# Patient Record
Sex: FEMALE | Race: BLACK OR AFRICAN AMERICAN | NOT HISPANIC OR LATINO | URBAN - NONMETROPOLITAN AREA
[De-identification: names, ages, dates, MRNs, and addresses within clinical notes are randomized per-mention and may not be internally consistent; named-entity substitution may affect disease eponyms.]

---

## 2018-04-18 ENCOUNTER — HISTORICAL (OUTPATIENT)
Dept: ADMINISTRATIVE | Facility: HOSPITAL | Age: 48
End: 2018-04-18

## 2018-07-12 ENCOUNTER — HISTORICAL (OUTPATIENT)
Dept: ADMINISTRATIVE | Facility: HOSPITAL | Age: 48
End: 2018-07-12

## 2018-07-24 ENCOUNTER — HISTORICAL (OUTPATIENT)
Dept: ADMINISTRATIVE | Facility: HOSPITAL | Age: 48
End: 2018-07-24

## 2018-07-25 ENCOUNTER — HISTORICAL (OUTPATIENT)
Dept: ADMINISTRATIVE | Facility: HOSPITAL | Age: 48
End: 2018-07-25

## 2018-08-28 ENCOUNTER — HISTORICAL (OUTPATIENT)
Dept: ADMINISTRATIVE | Facility: HOSPITAL | Age: 48
End: 2018-08-28

## 2018-12-06 ENCOUNTER — HISTORICAL (OUTPATIENT)
Dept: ADMINISTRATIVE | Facility: HOSPITAL | Age: 48
End: 2018-12-06

## 2018-12-10 ENCOUNTER — HISTORICAL (OUTPATIENT)
Dept: ADMINISTRATIVE | Facility: HOSPITAL | Age: 48
End: 2018-12-10

## 2018-12-27 ENCOUNTER — HISTORICAL (OUTPATIENT)
Dept: ADMINISTRATIVE | Facility: HOSPITAL | Age: 48
End: 2018-12-27

## 2018-12-28 ENCOUNTER — HISTORICAL (OUTPATIENT)
Dept: ADMINISTRATIVE | Facility: HOSPITAL | Age: 48
End: 2018-12-28

## 2019-03-19 ENCOUNTER — HISTORICAL (OUTPATIENT)
Dept: ADMINISTRATIVE | Facility: HOSPITAL | Age: 49
End: 2019-03-19

## 2019-06-09 ENCOUNTER — HISTORICAL (OUTPATIENT)
Dept: ADMINISTRATIVE | Facility: HOSPITAL | Age: 49
End: 2019-06-09

## 2019-06-11 ENCOUNTER — HISTORICAL (OUTPATIENT)
Dept: ADMINISTRATIVE | Facility: HOSPITAL | Age: 49
End: 2019-06-11

## 2019-07-08 ENCOUNTER — HISTORICAL (OUTPATIENT)
Dept: ADMINISTRATIVE | Facility: HOSPITAL | Age: 49
End: 2019-07-08

## 2019-10-17 ENCOUNTER — HISTORICAL (OUTPATIENT)
Dept: ADMINISTRATIVE | Facility: HOSPITAL | Age: 49
End: 2019-10-17

## 2019-11-27 ENCOUNTER — HISTORICAL (OUTPATIENT)
Dept: ADMINISTRATIVE | Facility: HOSPITAL | Age: 49
End: 2019-11-27

## 2020-01-14 ENCOUNTER — HISTORICAL (OUTPATIENT)
Dept: ADMINISTRATIVE | Facility: HOSPITAL | Age: 50
End: 2020-01-14

## 2020-01-19 ENCOUNTER — HISTORICAL (OUTPATIENT)
Dept: ADMINISTRATIVE | Facility: HOSPITAL | Age: 50
End: 2020-01-19

## 2020-01-21 ENCOUNTER — HISTORICAL (OUTPATIENT)
Dept: ADMINISTRATIVE | Facility: HOSPITAL | Age: 50
End: 2020-01-21

## 2020-01-23 ENCOUNTER — HISTORICAL (OUTPATIENT)
Dept: ADMINISTRATIVE | Facility: HOSPITAL | Age: 50
End: 2020-01-23

## 2020-02-11 ENCOUNTER — HISTORICAL (OUTPATIENT)
Dept: ADMINISTRATIVE | Facility: HOSPITAL | Age: 50
End: 2020-02-11

## 2020-02-24 ENCOUNTER — HISTORICAL (OUTPATIENT)
Dept: ADMINISTRATIVE | Facility: HOSPITAL | Age: 50
End: 2020-02-24

## 2020-03-19 ENCOUNTER — HISTORICAL (OUTPATIENT)
Dept: CARDIOLOGY | Facility: HOSPITAL | Age: 50
End: 2020-03-19

## 2020-03-23 ENCOUNTER — HISTORICAL (OUTPATIENT)
Dept: ADMINISTRATIVE | Facility: HOSPITAL | Age: 50
End: 2020-03-23

## 2020-07-14 ENCOUNTER — HISTORICAL (OUTPATIENT)
Dept: ADMINISTRATIVE | Facility: HOSPITAL | Age: 50
End: 2020-07-14

## 2020-12-22 ENCOUNTER — HISTORICAL (OUTPATIENT)
Dept: ADMINISTRATIVE | Facility: HOSPITAL | Age: 50
End: 2020-12-22

## 2021-02-02 LAB
ALBUMIN SERPL-MCNC: 4.5 G/DL (ref 3.4–5)
ALBUMIN/GLOB SERPL: 1.5 {RATIO}
ALP SERPL-CCNC: 92 U/L (ref 50–144)
ALT SERPL-CCNC: 21 U/L (ref 1–45)
ANION GAP SERPL CALC-SCNC: 8 MMOL/L (ref 7–16)
AST SERPL-CCNC: 32 U/L (ref 14–36)
BASOPHILS # BLD AUTO: 0.02 X10(3)/MCL (ref 0.01–0.08)
BASOPHILS NFR BLD AUTO: 0.3 % (ref 0.1–1.2)
BILIRUB SERPL-MCNC: 0.47 MG/DL (ref 0.1–1)
BUN SERPL-MCNC: 13 MG/DL (ref 7–20)
CALCIUM SERPL-MCNC: 10.4 MG/DL (ref 8.4–10.2)
CHLORIDE SERPL-SCNC: 106 MMOL/L (ref 94–110)
CHOLEST SERPL-MCNC: 135 MG/DL (ref 0–200)
CO2 SERPL-SCNC: 27 MMOL/L (ref 21–32)
CREAT SERPL-MCNC: 1 MG/DL (ref 0.52–1.04)
CREAT/UREA NIT SERPL: 13 (ref 12–20)
EOSINOPHIL # BLD AUTO: 0.08 X10(3)/MCL (ref 0.04–0.36)
EOSINOPHIL NFR BLD AUTO: 1.1 % (ref 0.7–7)
ERYTHROCYTE [DISTWIDTH] IN BLOOD BY AUTOMATED COUNT: 13.4 % (ref 11–14.5)
EST. AVERAGE GLUCOSE BLD GHB EST-MCNC: 129 MG/DL (ref 70–115)
GLOBULIN SER-MCNC: 3 G/DL (ref 2–3.9)
GLUCOSE SERPL-MCNC: 143 MGM./DL (ref 70–115)
HBA1C MFR BLD: 6.3 % (ref 4–6)
HCT VFR BLD AUTO: 45.5 % (ref 36–48)
HDLC SERPL-MCNC: 83 MG/DL (ref 40–60)
HGB BLD-MCNC: 13.7 G/DL (ref 11.8–16)
IMM GRANULOCYTES # BLD AUTO: 0.01 X10E3/UL (ref 0–0.03)
IMM GRANULOCYTES NFR BLD AUTO: 0.1 % (ref 0–0.5)
LDLC SERPL CALC-MCNC: <30 MG/DL (ref 30–100)
LYMPHOCYTES # BLD AUTO: 2.61 X10(3)/MCL (ref 1.16–3.74)
LYMPHOCYTES NFR BLD AUTO: 36.6 % (ref 20–55)
MCH RBC QN AUTO: 27.9 PG (ref 27–34)
MCHC RBC AUTO-ENTMCNC: 30.1 G/DL (ref 31–37)
MCV RBC AUTO: 92.7 FL (ref 79–99)
MONOCYTES # BLD AUTO: 0.51 X10(3)/MCL (ref 0.24–0.36)
MONOCYTES NFR BLD AUTO: 7.1 % (ref 4.7–12.5)
NEUTROPHILS # BLD AUTO: 3.91 X10(3)/MCL (ref 1.56–6.13)
NEUTROPHILS NFR BLD AUTO: 54.8 % (ref 37–73)
PLATELET # BLD AUTO: 229 X10(3)/MCL (ref 140–371)
PMV BLD AUTO: 11.3 FL (ref 9.4–12.4)
POTASSIUM SERPL-SCNC: 4.3 MMOL/L (ref 3.5–5.1)
PROT SERPL-MCNC: 7.5 G/DL (ref 6.3–8.2)
RBC # BLD AUTO: 4.91 X10(6)/MCL (ref 4–5.1)
SODIUM SERPL-SCNC: 141 MMOL/L (ref 135–145)
TRIGL SERPL-MCNC: 172 MG/DL (ref 30–200)
TSH SERPL-ACNC: 1.7 UIU/ML (ref 0.36–3.74)
WBC # SPEC AUTO: 7.1 X10(3)/MCL (ref 4–11.5)

## 2021-07-27 ENCOUNTER — HISTORICAL (OUTPATIENT)
Dept: ADMINISTRATIVE | Facility: HOSPITAL | Age: 51
End: 2021-07-27

## 2021-08-10 LAB
ALBUMIN SERPL-MCNC: 4 G/DL (ref 3.4–5)
ALBUMIN/GLOB SERPL: 1.3 {RATIO}
ALP SERPL-CCNC: 118 U/L (ref 50–144)
ALT SERPL-CCNC: 18 U/L (ref 1–45)
ANION GAP SERPL CALC-SCNC: 8 MMOL/L (ref 7–16)
AST SERPL-CCNC: 22 U/L (ref 14–36)
BILIRUB SERPL-MCNC: 0.48 MG/DL (ref 0.1–1)
BUN SERPL-MCNC: 14 MG/DL (ref 7–20)
CALCIUM SERPL-MCNC: 10.1 MG/DL (ref 8.4–10.2)
CHLORIDE SERPL-SCNC: 105 MMOL/L (ref 94–110)
CO2 SERPL-SCNC: 24 MMOL/L (ref 21–32)
CREAT SERPL-MCNC: 0.8 MG/DL (ref 0.52–1.04)
CREAT/UREA NIT SERPL: 17.5 (ref 12–20)
EST. AVERAGE GLUCOSE BLD GHB EST-MCNC: 186 MG/DL (ref 70–115)
GLOBULIN SER-MCNC: 3.1 G/DL (ref 2–3.9)
GLUCOSE SERPL-MCNC: 217 MGM./DL (ref 70–115)
HBA1C MFR BLD: 8.2 % (ref 4–6)
POTASSIUM SERPL-SCNC: 4.3 MMOL/L (ref 3.5–5.1)
PROT SERPL-MCNC: 7.1 G/DL (ref 6.3–8.2)
SODIUM SERPL-SCNC: 137 MMOL/L (ref 135–145)
TSH SERPL-ACNC: 1.78 UIU/ML (ref 0.36–3.74)

## 2022-03-22 LAB
LEFT EYE DM RETINOPATHY: NEGATIVE
RIGHT EYE DM RETINOPATHY: NEGATIVE

## 2022-04-10 ENCOUNTER — HISTORICAL (OUTPATIENT)
Dept: ADMINISTRATIVE | Facility: HOSPITAL | Age: 52
End: 2022-04-10

## 2022-04-28 VITALS
OXYGEN SATURATION: 96 % | DIASTOLIC BLOOD PRESSURE: 82 MMHG | HEIGHT: 62 IN | WEIGHT: 177.5 LBS | BODY MASS INDEX: 32.66 KG/M2 | SYSTOLIC BLOOD PRESSURE: 126 MMHG

## 2022-04-30 ENCOUNTER — HISTORICAL (OUTPATIENT)
Dept: ADMINISTRATIVE | Facility: HOSPITAL | Age: 52
End: 2022-04-30

## 2023-01-26 ENCOUNTER — TELEPHONE (OUTPATIENT)
Dept: FAMILY MEDICINE | Facility: CLINIC | Age: 53
End: 2023-01-26
Payer: MEDICAID

## 2023-01-26 NOTE — TELEPHONE ENCOUNTER
----- Message from Zulma Zhang, JESUSP-C sent at 1/26/2023  6:51 AM CST -----  Regarding: Call patient  I saw Dasha yesterday as she was here with her daughter, She inquired about a skin lesion that we talked about previously.  I would like to know if she ever got that derm appointment, as he would be the perfect person to discuss this with.  Also let her know that she has labs and f/u in February with me.

## 2023-01-27 NOTE — TELEPHONE ENCOUNTER
I called and notified her that the referral was sent to Dr. Prasanth Edwards and gave her the phone number.

## 2023-01-30 ENCOUNTER — DOCUMENTATION ONLY (OUTPATIENT)
Dept: ADMINISTRATIVE | Facility: HOSPITAL | Age: 53
End: 2023-01-30
Payer: MEDICAID

## 2023-02-03 DIAGNOSIS — M54.16 LUMBAR RADICULOPATHY: Primary | ICD-10-CM

## 2023-02-03 RX ORDER — NITROGLYCERIN 0.4 MG/1
TABLET SUBLINGUAL
Qty: 25 TABLET | Refills: 2 | Status: SHIPPED | OUTPATIENT
Start: 2023-02-03 | End: 2023-09-11

## 2023-02-03 RX ORDER — IBUPROFEN 800 MG/1
TABLET ORAL
Qty: 30 TABLET | Refills: 1 | Status: SHIPPED | OUTPATIENT
Start: 2023-02-03 | End: 2023-05-18

## 2023-02-17 PROCEDURE — 80061 LIPID PANEL: CPT | Performed by: NURSE PRACTITIONER

## 2023-02-17 PROCEDURE — 83036 HEMOGLOBIN GLYCOSYLATED A1C: CPT | Performed by: NURSE PRACTITIONER

## 2023-02-17 PROCEDURE — 85025 COMPLETE CBC W/AUTO DIFF WBC: CPT | Performed by: NURSE PRACTITIONER

## 2023-02-17 PROCEDURE — 80053 COMPREHEN METABOLIC PANEL: CPT | Performed by: NURSE PRACTITIONER

## 2023-02-17 PROCEDURE — 84443 ASSAY THYROID STIM HORMONE: CPT | Performed by: NURSE PRACTITIONER

## 2023-02-23 ENCOUNTER — OFFICE VISIT (OUTPATIENT)
Dept: BEHAVIORAL HEALTH | Facility: CLINIC | Age: 53
End: 2023-02-23
Payer: MEDICAID

## 2023-02-23 VITALS
HEIGHT: 62 IN | BODY MASS INDEX: 32.57 KG/M2 | DIASTOLIC BLOOD PRESSURE: 88 MMHG | WEIGHT: 177 LBS | TEMPERATURE: 97 F | HEART RATE: 94 BPM | SYSTOLIC BLOOD PRESSURE: 124 MMHG | OXYGEN SATURATION: 97 %

## 2023-02-23 DIAGNOSIS — G47.00 INSOMNIA, UNSPECIFIED TYPE: ICD-10-CM

## 2023-02-23 DIAGNOSIS — F41.1 GENERALIZED ANXIETY DISORDER: ICD-10-CM

## 2023-02-23 DIAGNOSIS — F33.9 EPISODE OF RECURRENT MAJOR DEPRESSIVE DISORDER, UNSPECIFIED DEPRESSION EPISODE SEVERITY: Primary | ICD-10-CM

## 2023-02-23 PROCEDURE — 3008F PR BODY MASS INDEX (BMI) DOCUMENTED: ICD-10-PCS | Mod: CPTII,,, | Performed by: NURSE PRACTITIONER

## 2023-02-23 PROCEDURE — 1160F RVW MEDS BY RX/DR IN RCRD: CPT | Mod: CPTII,,, | Performed by: NURSE PRACTITIONER

## 2023-02-23 PROCEDURE — 3079F PR MOST RECENT DIASTOLIC BLOOD PRESSURE 80-89 MM HG: ICD-10-PCS | Mod: CPTII,,, | Performed by: NURSE PRACTITIONER

## 2023-02-23 PROCEDURE — 3074F SYST BP LT 130 MM HG: CPT | Mod: CPTII,,, | Performed by: NURSE PRACTITIONER

## 2023-02-23 PROCEDURE — 1160F PR REVIEW ALL MEDS BY PRESCRIBER/CLIN PHARMACIST DOCUMENTED: ICD-10-PCS | Mod: CPTII,,, | Performed by: NURSE PRACTITIONER

## 2023-02-23 PROCEDURE — 3079F DIAST BP 80-89 MM HG: CPT | Mod: CPTII,,, | Performed by: NURSE PRACTITIONER

## 2023-02-23 PROCEDURE — 1159F PR MEDICATION LIST DOCUMENTED IN MEDICAL RECORD: ICD-10-PCS | Mod: CPTII,,, | Performed by: NURSE PRACTITIONER

## 2023-02-23 PROCEDURE — 3074F PR MOST RECENT SYSTOLIC BLOOD PRESSURE < 130 MM HG: ICD-10-PCS | Mod: CPTII,,, | Performed by: NURSE PRACTITIONER

## 2023-02-23 PROCEDURE — 1159F MED LIST DOCD IN RCRD: CPT | Mod: CPTII,,, | Performed by: NURSE PRACTITIONER

## 2023-02-23 PROCEDURE — 99204 PR OFFICE/OUTPT VISIT, NEW, LEVL IV, 45-59 MIN: ICD-10-PCS | Mod: ,,, | Performed by: NURSE PRACTITIONER

## 2023-02-23 PROCEDURE — 3008F BODY MASS INDEX DOCD: CPT | Mod: CPTII,,, | Performed by: NURSE PRACTITIONER

## 2023-02-23 PROCEDURE — 99204 OFFICE O/P NEW MOD 45 MIN: CPT | Mod: ,,, | Performed by: NURSE PRACTITIONER

## 2023-02-23 RX ORDER — CETIRIZINE HYDROCHLORIDE 10 MG/1
10 TABLET ORAL DAILY PRN
COMMUNITY
Start: 2022-11-15

## 2023-02-23 RX ORDER — ZOLPIDEM TARTRATE 10 MG/1
10 TABLET ORAL NIGHTLY PRN
Qty: 30 TABLET | Refills: 1 | Status: SHIPPED | OUTPATIENT
Start: 2023-02-23 | End: 2023-04-18 | Stop reason: SDUPTHER

## 2023-02-23 RX ORDER — CARVEDILOL 12.5 MG/1
12.5 TABLET ORAL 2 TIMES DAILY
COMMUNITY
Start: 2023-02-13 | End: 2023-03-13 | Stop reason: SDUPTHER

## 2023-02-23 RX ORDER — CYCLOBENZAPRINE HCL 10 MG
10 TABLET ORAL NIGHTLY PRN
COMMUNITY
Start: 2022-12-22 | End: 2023-02-28 | Stop reason: SDUPTHER

## 2023-02-23 RX ORDER — ZOLPIDEM TARTRATE 10 MG/1
10 TABLET ORAL NIGHTLY PRN
COMMUNITY
Start: 2023-02-13 | End: 2023-02-23 | Stop reason: SDUPTHER

## 2023-02-23 RX ORDER — DULOXETIN HYDROCHLORIDE 60 MG/1
60 CAPSULE, DELAYED RELEASE ORAL DAILY
Qty: 30 CAPSULE | Refills: 1 | Status: SHIPPED | OUTPATIENT
Start: 2023-02-23 | End: 2023-04-18 | Stop reason: SDUPTHER

## 2023-02-23 RX ORDER — METFORMIN HYDROCHLORIDE 500 MG/1
2 TABLET, EXTENDED RELEASE ORAL NIGHTLY
COMMUNITY
Start: 2023-02-13 | End: 2023-03-13 | Stop reason: SDUPTHER

## 2023-02-23 RX ORDER — LEVETIRACETAM 500 MG/1
500 TABLET ORAL 2 TIMES DAILY
COMMUNITY
Start: 2023-02-13 | End: 2023-02-28 | Stop reason: SDUPTHER

## 2023-02-23 RX ORDER — ALPRAZOLAM 0.5 MG/1
0.5 TABLET ORAL 3 TIMES DAILY PRN
Qty: 90 TABLET | Refills: 1 | Status: SHIPPED | OUTPATIENT
Start: 2023-02-23 | End: 2023-04-18 | Stop reason: SDUPTHER

## 2023-02-23 RX ORDER — DULAGLUTIDE 0.75 MG/.5ML
0.75 INJECTION, SOLUTION SUBCUTANEOUS WEEKLY
COMMUNITY
Start: 2023-02-10 | End: 2023-08-30 | Stop reason: SDUPTHER

## 2023-02-23 RX ORDER — EMPAGLIFLOZIN 25 MG/1
25 TABLET, FILM COATED ORAL EVERY MORNING
COMMUNITY
Start: 2023-02-10 | End: 2023-08-30

## 2023-02-23 RX ORDER — ASPIRIN 81 MG
81 TABLET, DELAYED RELEASE (ENTERIC COATED) ORAL NIGHTLY
COMMUNITY
Start: 2022-09-07

## 2023-02-23 RX ORDER — ATORVASTATIN CALCIUM 20 MG/1
20 TABLET, FILM COATED ORAL DAILY
COMMUNITY
Start: 2023-01-11 | End: 2023-03-13 | Stop reason: SDUPTHER

## 2023-02-23 RX ORDER — GABAPENTIN 400 MG/1
3 CAPSULE ORAL DAILY
COMMUNITY
Start: 2023-02-13 | End: 2023-08-30

## 2023-02-23 RX ORDER — ALPRAZOLAM 0.5 MG/1
0.5 TABLET ORAL 3 TIMES DAILY PRN
COMMUNITY
Start: 2023-02-13 | End: 2023-02-23 | Stop reason: SDUPTHER

## 2023-02-23 RX ORDER — DULOXETIN HYDROCHLORIDE 60 MG/1
60 CAPSULE, DELAYED RELEASE ORAL DAILY
COMMUNITY
Start: 2023-02-13 | End: 2023-02-23 | Stop reason: SDUPTHER

## 2023-02-23 RX ORDER — FLUTICASONE PROPIONATE 50 MCG
1 SPRAY, SUSPENSION (ML) NASAL 2 TIMES DAILY
COMMUNITY
Start: 2022-11-15 | End: 2023-11-21 | Stop reason: SDUPTHER

## 2023-02-23 RX ORDER — SERTRALINE HYDROCHLORIDE 50 MG/1
50 TABLET, FILM COATED ORAL EVERY MORNING
COMMUNITY
Start: 2023-02-13 | End: 2023-02-23

## 2023-02-23 RX ORDER — AMLODIPINE BESYLATE 10 MG/1
10 TABLET ORAL DAILY
COMMUNITY
Start: 2023-02-13 | End: 2023-08-30

## 2023-02-23 NOTE — PROGRESS NOTES
"PSYCHIATRIC FOLLOW-UP VISIT NOTE    Chief Complaint   Patient presents with    Anxiety     "I have no complaints."         History of Present Illness  53 y.o. year old Black or  female with hx of MDD, scot, and insomnia seen today for follow-up appointment and medication management.  Reports she is doing well on current medications and denies any side effects at this time.  Denies recent depression.  Mood has been good.  No appetite changes, isolative behaviors, feelings of guilt or shame, anhedonia, and morbid ruminations.  Anxiety also well controlled with current medication regimen.  She denies any panic symptoms, excessive worry, fidgetiness, or irritability.  She is sleeping well and feels rested in the morning.   checked.  No suspected diversion or abuse.  Overall she is been out any acute complaints today and tolerating current medication regimen with good efficacy and no adverse effects.Patient denies SI/HI. Denies hallucinations and does not appear to be responding to internal stimuli or be internally preoccupied. No manic symptoms noted.       Objective:     Vitals:  Vitals:    02/23/23 1325   BP: 124/88   BP Location: Left arm   Patient Position: Sitting   Pulse: 94   Temp: 96.8 °F (36 °C)   TempSrc: Temporal   SpO2: 97%   Weight: 80.3 kg (177 lb)   Height: 5' 2" (1.575 m)       Wt Readings from Last 3 Encounters:   02/23/23 1325 80.3 kg (177 lb)   01/25/22 1452 80.5 kg (177 lb 7.5 oz)   12/08/21 1414 80.6 kg (177 lb 11.1 oz)   11/11/21 1513 83.5 kg (184 lb 1.4 oz)   11/08/21 1303 82.4 kg (181 lb 10.5 oz)   09/08/21 1024 81.5 kg (179 lb 10.8 oz)   08/16/21 1454 80.6 kg (177 lb 11.1 oz)   07/21/21 1356 82.7 kg (182 lb 5.1 oz)   02/03/21 0910 82.9 kg (182 lb 12.2 oz)   01/20/21 1315 81 kg (178 lb 9.2 oz)   11/18/20 1400 81.3 kg (179 lb 3.7 oz)   08/07/20 1047 88.5 kg (195 lb 1.7 oz)   03/03/20 1045 94.4 kg (208 lb 1.8 oz)   01/31/20 1517 95.5 kg (210 lb 8.6 oz) "         Medication:    Current Outpatient Medications:     ADULT LOW DOSE ASPIRIN 81 mg EC tablet, Take 81 mg by mouth nightly., Disp: , Rfl:     amLODIPine (NORVASC) 10 MG tablet, Take 10 mg by mouth Daily., Disp: , Rfl:     atorvastatin (LIPITOR) 20 MG tablet, Take 20 mg by mouth Daily., Disp: , Rfl:     carvediloL (COREG) 12.5 MG tablet, Take 12.5 mg by mouth 2 (two) times daily., Disp: , Rfl:     cetirizine (ZYRTEC) 10 MG tablet, Take 10 mg by mouth daily as needed., Disp: , Rfl:     cyclobenzaprine (FLEXERIL) 10 MG tablet, Take 10 mg by mouth nightly as needed., Disp: , Rfl:     fluticasone propionate (FLONASE) 50 mcg/actuation nasal spray, 1 spray by Each Nostril route 2 (two) times daily., Disp: , Rfl:     gabapentin (NEURONTIN) 400 MG capsule, Take 3 capsules by mouth Daily., Disp: , Rfl:     ibuprofen (ADVIL,MOTRIN) 800 MG tablet, TAKE 1 TABLET BY MOUTH EVERY EIGHT HOURS, Disp: 30 tablet, Rfl: 1    JARDIANCE 25 mg tablet, Take 25 mg by mouth every morning., Disp: , Rfl:     levETIRAcetam (KEPPRA) 500 MG Tab, Take 500 mg by mouth 2 (two) times daily., Disp: , Rfl:     metFORMIN (GLUCOPHAGE-XR) 500 MG ER 24hr tablet, Take 2 tablets by mouth nightly., Disp: , Rfl:     nitroGLYCERIN (NITROSTAT) 0.4 MG SL tablet, 1 TABLET UNDER TONGUE EVERY  5 MINUTES NO MORE THAN 3 TABS THEN GO TO ER FOR CHEST PAIN, Disp: 25 tablet, Rfl: 2    terbinafine HCL (LAMISIL) 250 mg tablet, TAKE 1 TABLET BY MOUTH EVERY DAY for 12 weeks, Disp: 84 tablet, Rfl: 0    TRULICITY 0.75 mg/0.5 mL pen injector, Inject 0.75 mg into the skin once a week., Disp: , Rfl:     ALPRAZolam (XANAX) 0.5 MG tablet, Take 1 tablet (0.5 mg total) by mouth 3 (three) times daily as needed for Anxiety., Disp: 90 tablet, Rfl: 1    DULoxetine (CYMBALTA) 60 MG capsule, Take 1 capsule (60 mg total) by mouth Daily., Disp: 30 capsule, Rfl: 1    zolpidem (AMBIEN) 10 mg Tab, Take 1 tablet (10 mg total) by mouth nightly as needed., Disp: 30 tablet, Rfl: 1  "      Significant Labs: - none at this time    Significant Imaging: - none at this time    Physical Exam  Vitals and nursing note reviewed.   Constitutional:       General: She is awake.      Appearance: Normal appearance.   Musculoskeletal:      Comments: Full ROM   Neurological:      Mental Status: She is alert.   Psychiatric:         Attention and Perception: Attention and perception normal. She does not perceive auditory or visual hallucinations.         Mood and Affect: Affect normal.         Speech: Speech normal.         Behavior: Behavior is cooperative.         Thought Content: Thought content does not include homicidal or suicidal ideation.         Cognition and Memory: Cognition and memory normal.        Review of Systems     Mental Status Exam:  Presentation:  - Appearance: 53 y.o. year old Black or  female, appears stated age, appears Casually dressed and Well groomed  - Motility: Erect when standing, Steady gait, No EPS or Tremors, No psychomotor agitation or retardation appreciated  - Behavior: calm, cooperative, good eye contact  Speech:  - Character/Organization: spontaneous, fluent, normal volume, normal rate, normal rhythm  Emotional State:  - Mood: "happy"   - Affect: congruent and appropriate  Thought:  - Process: logical, linear, organized , goal-directed  - Preoccupations: no ruminations, rituals, or phobias appreciated  - Delusions: no persecutory, paranoid, or grandiose delusions appreciated  - Perception: denies AVH, not actively responding to internal stimuli  - SI/HI: denies/denies  Sensorium & Intellect:  - Sensorium: AAOx4  - Memory: intact to recent and remote events  - Attention/Concentration: good/good  - Insight/Judgement: good/good    Gait: normal swing and stance  MSK:no rigidity appreciated    All other systems without acute issues unless noted in HPI      Assessment/Plan      ICD-10-CM ICD-9-CM    1. Episode of recurrent major depressive disorder, unspecified " depression episode severity  F33.9 296.30 DULoxetine (CYMBALTA) 60 MG capsule      2. Generalized anxiety disorder  F41.1 300.02 ALPRAZolam (XANAX) 0.5 MG tablet      DULoxetine (CYMBALTA) 60 MG capsule      3. Insomnia, unspecified type  G47.00 780.52 zolpidem (AMBIEN) 10 mg Tab         Continue current medications without change    Potential side effects and risks vs benefits of current treatment plan reviewed with patient. Applicable black box warnings reviewed. Encouraged patient not to alter dosages or abruptly discontinue medications without contacting prescriber first, due to risk of worsening symptoms and decompensation of mental status. Warned of risks associated with herbal remedies and supplements while taking psychotropic medications and of the need to consult prescriber prior to adding any of these to current regimen. Patient should abstain from abuse of alcohol, prescription medications, and illicit drugs. Reviewed when to contact clinic and/or seek emergent care, such as but not limited to, onset/worsening SI/HI, hallucinations, delusions, manic symptoms. Pt verbalized understanding and agreement of these warnings/recommendations and verbally consented to treatment plan.      Follow up in about 2 months (around 4/23/2023).    Brendan Case, JUSTICEP

## 2023-02-28 ENCOUNTER — OFFICE VISIT (OUTPATIENT)
Dept: FAMILY MEDICINE | Facility: CLINIC | Age: 53
End: 2023-02-28
Payer: MEDICAID

## 2023-02-28 VITALS
WEIGHT: 176.81 LBS | BODY MASS INDEX: 32.54 KG/M2 | SYSTOLIC BLOOD PRESSURE: 118 MMHG | TEMPERATURE: 98 F | HEART RATE: 87 BPM | OXYGEN SATURATION: 96 % | HEIGHT: 62 IN | DIASTOLIC BLOOD PRESSURE: 88 MMHG

## 2023-02-28 DIAGNOSIS — E11.65 TYPE 2 DIABETES MELLITUS WITH HYPERGLYCEMIA, WITHOUT LONG-TERM CURRENT USE OF INSULIN: Primary | ICD-10-CM

## 2023-02-28 DIAGNOSIS — B37.2 CANDIDIASIS OF SKIN: ICD-10-CM

## 2023-02-28 DIAGNOSIS — I10 ESSENTIAL HYPERTENSION: ICD-10-CM

## 2023-02-28 DIAGNOSIS — D23.5: ICD-10-CM
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
## 2023-02-28 DIAGNOSIS — M62.838 MUSCLE SPASM: ICD-10-CM

## 2023-02-28 DIAGNOSIS — L98.9 SKIN LESION: ICD-10-CM

## 2023-02-28 PROBLEM — R29.6 FALLS: Status: ACTIVE | Noted: 2023-02-28
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
## 2023-02-28 PROCEDURE — 1160F RVW MEDS BY RX/DR IN RCRD: CPT | Mod: CPTII,,, | Performed by: NURSE PRACTITIONER

## 2023-02-28 PROCEDURE — 1160F PR REVIEW ALL MEDS BY PRESCRIBER/CLIN PHARMACIST DOCUMENTED: ICD-10-PCS | Mod: CPTII,,, | Performed by: NURSE PRACTITIONER

## 2023-02-28 PROCEDURE — 3079F DIAST BP 80-89 MM HG: CPT | Mod: CPTII,,, | Performed by: NURSE PRACTITIONER

## 2023-02-28 PROCEDURE — 3079F PR MOST RECENT DIASTOLIC BLOOD PRESSURE 80-89 MM HG: ICD-10-PCS | Mod: CPTII,,, | Performed by: NURSE PRACTITIONER

## 2023-02-28 PROCEDURE — 3008F PR BODY MASS INDEX (BMI) DOCUMENTED: ICD-10-PCS | Mod: CPTII,,, | Performed by: NURSE PRACTITIONER

## 2023-02-28 PROCEDURE — 11104 EXCISION OF LESION: ICD-10-PCS | Mod: ,,, | Performed by: NURSE PRACTITIONER

## 2023-02-28 PROCEDURE — 99214 PR OFFICE/OUTPT VISIT, EST, LEVL IV, 30-39 MIN: ICD-10-PCS | Mod: 25,,, | Performed by: NURSE PRACTITIONER

## 2023-02-28 PROCEDURE — 11104 PUNCH BX SKIN SINGLE LESION: CPT | Mod: ,,, | Performed by: NURSE PRACTITIONER

## 2023-02-28 PROCEDURE — 99214 OFFICE O/P EST MOD 30 MIN: CPT | Mod: 25,,, | Performed by: NURSE PRACTITIONER

## 2023-02-28 PROCEDURE — 3008F BODY MASS INDEX DOCD: CPT | Mod: CPTII,,, | Performed by: NURSE PRACTITIONER

## 2023-02-28 PROCEDURE — 3074F SYST BP LT 130 MM HG: CPT | Mod: CPTII,,, | Performed by: NURSE PRACTITIONER

## 2023-02-28 PROCEDURE — 1159F PR MEDICATION LIST DOCUMENTED IN MEDICAL RECORD: ICD-10-PCS | Mod: CPTII,,, | Performed by: NURSE PRACTITIONER

## 2023-02-28 PROCEDURE — 1159F MED LIST DOCD IN RCRD: CPT | Mod: CPTII,,, | Performed by: NURSE PRACTITIONER

## 2023-02-28 PROCEDURE — 3074F PR MOST RECENT SYSTOLIC BLOOD PRESSURE < 130 MM HG: ICD-10-PCS | Mod: CPTII,,, | Performed by: NURSE PRACTITIONER

## 2023-02-28 RX ORDER — LEVETIRACETAM 500 MG/1
500 TABLET ORAL 2 TIMES DAILY
Qty: 60 TABLET | Refills: 11 | Status: SHIPPED | OUTPATIENT
Start: 2023-02-28 | End: 2023-11-21 | Stop reason: SDUPTHER

## 2023-02-28 RX ORDER — NYSTATIN 100000 U/G
OINTMENT TOPICAL 2 TIMES DAILY
Qty: 30 G | Refills: 2 | Status: SHIPPED | OUTPATIENT
Start: 2023-02-28 | End: 2023-08-16

## 2023-02-28 RX ORDER — CYCLOBENZAPRINE HCL 10 MG
10 TABLET ORAL NIGHTLY PRN
Qty: 30 TABLET | Refills: 2 | Status: SHIPPED | OUTPATIENT
Start: 2023-02-28 | End: 2023-05-16 | Stop reason: SDUPTHER

## 2023-02-28 NOTE — PROGRESS NOTES
"Patient ID: Dasha Curiel  : 1970    Chief Complaint: Hypertension and Diabetes (Follow up)    Allergies: Patient is allergic to iodine, meperidine, and promethazine.     History of Present Illness:  The patient is a 53 y.o. Black or  female who presents to clinic for follow up on Hypertension and Diabetes (Follow up)   She is doing well in general.  Tells me that she has been compliant with her diabetic regimen and checking blood glucose levels regularly.    She reports that she falls frequently due to her "ankles giving out." This has been ongoing for quite some time, probably for about a year, although this is the first time we have discussed this. She says her ankles roll-inward and she will fall. She denies injury, no pain, swelling.     Did not receive a call from Dermatology to schedule for her lesion to the left trunk that has been present for a long time now. It does not appear to have changed in size. It does not hurt, sometimes it is itchy.        Social History:  reports that she has never smoked. She has never been exposed to tobacco smoke. She has never used smokeless tobacco. She reports current alcohol use. She reports that she does not currently use drugs after having used the following drugs: "Crack" cocaine.    Past Medical History:  has a past medical history of Anxiety, CHF (congestive heart failure), CKD (chronic kidney disease), Depression, Diabetes mellitus, type 2, DM (diabetes mellitus), Elevated antinuclear antibody (LLOYD) level, Gout, unspecified, Hypertension, Insomnia, Lumbar radiculopathy, right, and Neuropathy.    Current Medications:  Current Outpatient Medications   Medication Instructions    ADULT LOW DOSE ASPIRIN 81 mg, Oral, Nightly    ALPRAZolam (XANAX) 0.5 mg, Oral, 3 times daily PRN    amLODIPine (NORVASC) 10 mg, Oral, Daily    atorvastatin (LIPITOR) 20 mg, Oral, Daily    carvediloL (COREG) 12.5 mg, Oral, 2 times daily    cetirizine (ZYRTEC) 10 mg, " "Oral, Daily PRN    cyclobenzaprine (FLEXERIL) 10 mg, Oral, Nightly PRN    DULoxetine (CYMBALTA) 60 mg, Oral, Daily    fluticasone propionate (FLONASE) 50 mcg/actuation nasal spray 1 spray, Each Nostril, 2 times daily    gabapentin (NEURONTIN) 400 MG capsule 3 capsules, Oral, Daily    ibuprofen (ADVIL,MOTRIN) 800 MG tablet TAKE 1 TABLET BY MOUTH EVERY EIGHT HOURS    JARDIANCE 25 mg, Oral, Every morning    levETIRAcetam (KEPPRA) 500 mg, Oral, 2 times daily    metFORMIN (GLUCOPHAGE-XR) 500 MG ER 24hr tablet 2 tablets, Oral, Nightly    nitroGLYCERIN (NITROSTAT) 0.4 MG SL tablet 1 TABLET UNDER TONGUE EVERY  5 MINUTES NO MORE THAN 3 TABS THEN GO TO ER FOR CHEST PAIN    nystatin (MYCOSTATIN) ointment Topical (Top), 2 times daily    terbinafine HCL (LAMISIL) 250 mg tablet TAKE 1 TABLET BY MOUTH EVERY DAY for 12 weeks    TRULICITY 0.75 mg, Subcutaneous, Weekly    zolpidem (AMBIEN) 10 mg, Oral, Nightly PRN       Review of Systems   See HPI    Visit Vitals  /88 (BP Location: Left arm, Patient Position: Sitting)   Pulse 87   Temp 97.7 °F (36.5 °C) (Temporal)   Ht 5' 2" (1.575 m)   Wt 80.2 kg (176 lb 12.9 oz)   SpO2 96%   BMI 32.34 kg/m²       Physical Exam  Constitutional:       Appearance: Normal appearance.   Cardiovascular:      Heart sounds: Normal heart sounds.   Pulmonary:      Breath sounds: Normal breath sounds.   Abdominal:      General: Bowel sounds are normal. There is no distension.      Palpations: There is no mass.      Tenderness: There is no abdominal tenderness.   Musculoskeletal:      Right foot: Normal. No swelling, foot drop or bony tenderness.      Left foot: Normal. No swelling, foot drop or bony tenderness.      Comments: No ankle laxity noted on exam.   Skin:     General: Skin is warm.      Findings: Lesion (approx 4.5cm round left lower anterior trunk/lateral, dark discoloration, flat, nontender, no erythema) and rash (mild erythema under breast folds) present.      Comments: Lesion left lateral " abdomen   Psychiatric:         Mood and Affect: Mood normal.              Labs Reviewed:  Chemistry:  Lab Results   Component Value Date     02/17/2023    K 4.3 02/17/2023    CHLORIDE 105 02/17/2023    BUN 11.0 02/17/2023    CREATININE 0.75 02/17/2023    EGFRNORACEVR >90 02/17/2023    GLUCOSE 166 (H) 02/17/2023    CALCIUM 10.1 02/17/2023    ALKPHOS 129 02/17/2023    LABPROT 7.7 02/17/2023    ALBUMIN 4.5 02/17/2023    BILIDIR 0.10 05/24/2019    IBILI 0.30 05/24/2019    AST 26 02/17/2023    ALT 19 02/17/2023    MG 2.5 (H) 05/23/2019    TSH 1.260 02/17/2023        Lab Results   Component Value Date    HGBA1C 6.6 (H) 02/17/2023        Hematology:  Lab Results   Component Value Date    WBC 8.1 02/17/2023    RBC 4.80 02/17/2023    HGB 13.7 02/17/2023    HCT 43.2 02/17/2023    MCV 90.0 02/17/2023    MCH 28.5 02/17/2023    MCHC 31.7 02/17/2023    RDW 11.9 02/17/2023     02/17/2023    MPV 11.2 02/17/2023       Lipid Panel:  Lab Results   Component Value Date    CHOL 143 02/17/2023    HDL 85 (H) 02/17/2023    DLDL 31.7 02/17/2023    TRIG 140 02/17/2023    TOTALCHOLEST 3.3 05/24/2019        Assessment & Plan:  1. Type 2 diabetes mellitus with hyperglycemia, without long-term current use of insulin  Overview:  A1c 6.8% (08/2022)  Remains on Trulicity 0.75 mg weekly, Metformin 1000 mg, and Jardiance 25 mg daily.      2. Essential hypertension  Well controlled.   Continue Norvasc 10 mg daily, Coreg 12.5 mg daily  Low Sodium Diet (DASH Diet - Less than 2 grams of sodium per day).  Monitor blood pressure daily and log. Report consistent numbers greater than 140/90.  Maintain healthy weight with goal BMI <30. Exercise 30 minutes per day, 5 days per week.  Smoking cessation encouraged to aid in BP reduction.    3. Fall, subsequent encounter  Secondary to ankle instability; advised wearing high top shoes with ankle support or an ankle brace to lend support while ambulating.  Referral for PT for strengthening.     4.  Muscle spasm  -     cyclobenzaprine (FLEXERIL) 10 MG tablet; Take 1 tablet (10 mg total) by mouth nightly as needed for Muscle spasms.  Dispense: 30 tablet; Refill: 2    5. Candidiasis of skin  Cleanse skin folds under breasts with antibacterial soap and pat dry daily.  Apply nystatin ointment until rash completely resolves and then continue treatment for an additional 3 days.   -     nystatin (MYCOSTATIN) ointment; Apply topically 2 (two) times daily.  Dispense: 30 g; Refill: 2    6. Benign neoplasm of skin of abdomen  -     Excision of Benign Lesion; Future  -     Specimen to Pathology Dermatology and skin neoplasms    7. Skin lesion  Overview:  Referral to Derm sent 12/2022      Other orders  -     levETIRAcetam (KEPPRA) 500 MG Tab; Take 1 tablet (500 mg total) by mouth 2 (two) times daily.  Dispense: 60 tablet; Refill: 11         Future Appointments   Date Time Provider Department Center   3/7/2023  3:30 PM CELY Goode [facility] [place] [facility]   [facility]18[facility]23  [facility]       Follow up in about 1 week (around 3/7/2023), or Punch biopsy results/suture removal. Call sooner if needed.    CELY Kramer

## 2023-02-28 NOTE — PROCEDURES
Excision of Lesion    Date/Time: 2/28/2023 1:00 PM  Performed by: CELY Goode  Authorized by: CELY Goode     Procedure - Consent: Verbal.  Timeout: prior to procedure the correct patient, procedure, and site was verified    Prep: patient was prepped and draped in usual sterile fashion    Local anesthesia used?: Yes    Anesthesia:  Local infiltration  Local anesthetic:  Lidocaine 1% without epinephrine  Anesthetic total (ml):  0.5  Assistants?: Yes    List of assistants:  SONIA Zurita was present for the entire procedure.  : Abnormal lesion.  Body area:  Abdomen  Laterality:  Left  Position:  Lateral   Patient was prepped and draped in the normal sterile fashion.  Anesthesia:  Local infiltration  Local anesthetic:  Lidocaine 1% without epinephrine  Excision type:  Skin  Malignancy:  Benign  Excision size (cm):  6  Scalpel size: 6 cm punch.  Incision type:  Other (comments) (punch)  Specimens?: Yes     Specimens submitted to pathology.   Hemostasis was obtained.  Estimated blood loss (cc):  0  Wound closure:  Simple  Wound repair size (cm):  6  Sutures:  Other (comments) (4-0 ethilon)  Dressings: bandage.  : benign neoplasm of unspecified behavior.   Needle, instrument, and sponge counts were correct.   Patient tolerated the procedure well with no immediate complications.   Post-operative instructions were provided for the patient.   Patient was discharged and will follow up for wound check and pathology results.

## 2023-03-01 ENCOUNTER — PATIENT MESSAGE (OUTPATIENT)
Dept: ADMINISTRATIVE | Facility: HOSPITAL | Age: 53
End: 2023-03-01
Payer: MEDICAID

## 2023-03-01 PROBLEM — L98.9 SKIN LESION: Status: RESOLVED | Noted: 2023-02-28 | Resolved: 2023-03-01

## 2023-03-01 PROBLEM — B37.2 CANDIDIASIS OF SKIN: Status: ACTIVE | Noted: 2023-03-01

## 2023-03-01 PROBLEM — M62.838 MUSCLE SPASM: Status: ACTIVE | Noted: 2023-03-01

## 2023-03-03 ENCOUNTER — TELEPHONE (OUTPATIENT)
Dept: FAMILY MEDICINE | Facility: CLINIC | Age: 53
End: 2023-03-03
Payer: MEDICAID

## 2023-03-03 NOTE — TELEPHONE ENCOUNTER
----- Message from Mike Flor sent at 3/3/2023  1:27 PM CST -----  Regarding: call back  Pt requesting medication called out for diarrhea    Liane    [phone]

## 2023-03-03 NOTE — TELEPHONE ENCOUNTER
Pt verbalized understanding that there is virus going around and that Zulma is out of office today. She verbalized understanding to take imodium otc and to follow the directions that are on the box.

## 2023-03-08 ENCOUNTER — OFFICE VISIT (OUTPATIENT)
Dept: FAMILY MEDICINE | Facility: CLINIC | Age: 53
End: 2023-03-08
Payer: MEDICAID

## 2023-03-08 VITALS
BODY MASS INDEX: 33.13 KG/M2 | WEIGHT: 180 LBS | DIASTOLIC BLOOD PRESSURE: 84 MMHG | SYSTOLIC BLOOD PRESSURE: 120 MMHG | HEART RATE: 84 BPM | TEMPERATURE: 98 F | OXYGEN SATURATION: 97 % | HEIGHT: 62 IN

## 2023-03-08 DIAGNOSIS — Z48.02 VISIT FOR SUTURE REMOVAL: ICD-10-CM

## 2023-03-08 DIAGNOSIS — B37.2 CANDIDIASIS OF SKIN: ICD-10-CM

## 2023-03-08 DIAGNOSIS — M25.373 ANKLE INSTABILITY, UNSPECIFIED LATERALITY: ICD-10-CM

## 2023-03-08 DIAGNOSIS — D86.3 CUTANEOUS SARCOIDOSIS: Primary | ICD-10-CM

## 2023-03-08 PROCEDURE — 1160F PR REVIEW ALL MEDS BY PRESCRIBER/CLIN PHARMACIST DOCUMENTED: ICD-10-PCS | Mod: CPTII,,, | Performed by: NURSE PRACTITIONER

## 2023-03-08 PROCEDURE — 3008F PR BODY MASS INDEX (BMI) DOCUMENTED: ICD-10-PCS | Mod: CPTII,,, | Performed by: NURSE PRACTITIONER

## 2023-03-08 PROCEDURE — 3079F DIAST BP 80-89 MM HG: CPT | Mod: CPTII,,, | Performed by: NURSE PRACTITIONER

## 2023-03-08 PROCEDURE — 99213 OFFICE O/P EST LOW 20 MIN: CPT | Mod: 24,,, | Performed by: NURSE PRACTITIONER

## 2023-03-08 PROCEDURE — 1160F RVW MEDS BY RX/DR IN RCRD: CPT | Mod: CPTII,,, | Performed by: NURSE PRACTITIONER

## 2023-03-08 PROCEDURE — 99213 PR OFFICE/OUTPT VISIT, EST, LEVL III, 20-29 MIN: ICD-10-PCS | Mod: 24,,, | Performed by: NURSE PRACTITIONER

## 2023-03-08 PROCEDURE — 1159F MED LIST DOCD IN RCRD: CPT | Mod: CPTII,,, | Performed by: NURSE PRACTITIONER

## 2023-03-08 PROCEDURE — 3079F PR MOST RECENT DIASTOLIC BLOOD PRESSURE 80-89 MM HG: ICD-10-PCS | Mod: CPTII,,, | Performed by: NURSE PRACTITIONER

## 2023-03-08 PROCEDURE — 3074F SYST BP LT 130 MM HG: CPT | Mod: CPTII,,, | Performed by: NURSE PRACTITIONER

## 2023-03-08 PROCEDURE — 3008F BODY MASS INDEX DOCD: CPT | Mod: CPTII,,, | Performed by: NURSE PRACTITIONER

## 2023-03-08 PROCEDURE — 1159F PR MEDICATION LIST DOCUMENTED IN MEDICAL RECORD: ICD-10-PCS | Mod: CPTII,,, | Performed by: NURSE PRACTITIONER

## 2023-03-08 PROCEDURE — 3074F PR MOST RECENT SYSTOLIC BLOOD PRESSURE < 130 MM HG: ICD-10-PCS | Mod: CPTII,,, | Performed by: NURSE PRACTITIONER

## 2023-03-08 RX ORDER — CLOBETASOL PROPIONATE 0.5 MG/G
CREAM TOPICAL 2 TIMES DAILY
Qty: 60 G | Refills: 2 | Status: SHIPPED | OUTPATIENT
Start: 2023-03-08 | End: 2024-03-07 | Stop reason: SDUPTHER

## 2023-03-08 NOTE — PROGRESS NOTES
"Patient ID: Dasha Curiel  : 1970    Chief Complaint: biopsy results    Allergies: Patient is allergic to iodine, meperidine, and promethazine.     History of Present Illness:  The patient is a 53 y.o. Black or  female who presents to clinic for follow up on biopsy results     Has had an unusual looking lesion to the left trunk since last year.  A referral to Dermatology was sent but she never received a call.  The lesion did not appear to have changed in size, it was nonpainful but occasionally mildly itchy.  At last visit a punch biopsy was done and sample sent for pathology. Lesion is healing, one suture came out ans she did have a little bit of drainage same day that has not resolved.     During that procedure it was noted that she had candidiasis underneath the skin folds of the breasts.  She was prescribed nystatin and states that it is much better.    Additionally she was having some issues with her ankles rolling on her while she was walking this was leading to some falls.  I did put in a referral to PT for strengthening but she has not received a call yet.           Social History:  reports that she has never smoked. She has never been exposed to tobacco smoke. She has never used smokeless tobacco. She reports current alcohol use. She reports that she does not currently use drugs after having used the following drugs: "Crack" cocaine.    Past Medical History:  has a past medical history of Anxiety, CHF (congestive heart failure), CKD (chronic kidney disease), Depression, Diabetes mellitus, type 2, DM (diabetes mellitus), Elevated antinuclear antibody (LLOYD) level, Gout, unspecified, Hypertension, Insomnia, Lumbar radiculopathy, right, and Neuropathy.    Current Medications:  Current Outpatient Medications   Medication Instructions    ADULT LOW DOSE ASPIRIN 81 mg, Oral, Nightly    ALPRAZolam (XANAX) 0.5 mg, Oral, 3 times daily PRN    amLODIPine (NORVASC) 10 mg, Oral, Daily    " "atorvastatin (LIPITOR) 20 mg, Oral, Daily    carvediloL (COREG) 12.5 mg, Oral, 2 times daily    cetirizine (ZYRTEC) 10 mg, Oral, Daily PRN    clobetasoL (TEMOVATE) 0.05 % cream Topical (Top), 2 times daily    cyclobenzaprine (FLEXERIL) 10 mg, Oral, Nightly PRN    DULoxetine (CYMBALTA) 60 mg, Oral, Daily    fluticasone propionate (FLONASE) 50 mcg/actuation nasal spray 1 spray, Each Nostril, 2 times daily    gabapentin (NEURONTIN) 400 MG capsule 3 capsules, Oral, Daily    ibuprofen (ADVIL,MOTRIN) 800 MG tablet TAKE 1 TABLET BY MOUTH EVERY EIGHT HOURS    JARDIANCE 25 mg, Oral, Every morning    levETIRAcetam (KEPPRA) 500 mg, Oral, 2 times daily    metFORMIN (GLUCOPHAGE-XR) 500 MG ER 24hr tablet 2 tablets, Oral, Nightly    nitroGLYCERIN (NITROSTAT) 0.4 MG SL tablet 1 TABLET UNDER TONGUE EVERY  5 MINUTES NO MORE THAN 3 TABS THEN GO TO ER FOR CHEST PAIN    nystatin (MYCOSTATIN) ointment Topical (Top), 2 times daily    terbinafine HCL (LAMISIL) 250 mg tablet TAKE 1 TABLET BY MOUTH EVERY DAY for 12 weeks    TRULICITY 0.75 mg, Subcutaneous, Weekly    zolpidem (AMBIEN) 10 mg, Oral, Nightly PRN       Review of Systems   See HPI    Visit Vitals  /84 (BP Location: Left arm)   Pulse 84   Temp 98.2 °F (36.8 °C) (Temporal)   Ht 5' 2" (1.575 m)   Wt 81.6 kg (180 lb)   SpO2 97%   BMI 32.92 kg/m²       Physical Exam  Constitutional:       Appearance: Normal appearance.   Cardiovascular:      Heart sounds: Normal heart sounds.   Pulmonary:      Breath sounds: Normal breath sounds.   Skin:     General: Skin is warm and dry.      Comments: 1 suture removed from site of punch biopsy; wound healing well, no erythema or drainage   Psychiatric:         Mood and Affect: Mood normal.          Labs Reviewed:  Chemistry:  Lab Results   Component Value Date     02/17/2023    K 4.3 02/17/2023    CHLORIDE 105 02/17/2023    BUN 11.0 02/17/2023    CREATININE 0.75 02/17/2023    EGFRNORACEVR >90 02/17/2023    GLUCOSE 166 (H) 02/17/2023    " CALCIUM 10.1 02/17/2023    ALKPHOS 129 02/17/2023    LABPROT 7.7 02/17/2023    ALBUMIN 4.5 02/17/2023    BILIDIR 0.10 05/24/2019    IBILI 0.30 05/24/2019    AST 26 02/17/2023    ALT 19 02/17/2023    MG 2.5 (H) 05/23/2019    TSH 1.260 02/17/2023        Lab Results   Component Value Date    HGBA1C 6.6 (H) 02/17/2023        Hematology:  Lab Results   Component Value Date    WBC 8.1 02/17/2023    RBC 4.80 02/17/2023    HGB 13.7 02/17/2023    HCT 43.2 02/17/2023    MCV 90.0 02/17/2023    MCH 28.5 02/17/2023    MCHC 31.7 02/17/2023    RDW 11.9 02/17/2023     02/17/2023    MPV 11.2 02/17/2023       Lipid Panel:  Lab Results   Component Value Date    CHOL 143 02/17/2023    HDL 85 (H) 02/17/2023    DLDL 31.7 02/17/2023    TRIG 140 02/17/2023    TOTALCHOLEST 3.3 05/24/2019        Assessment & Plan:  1. Cutaneous sarcoidosis  Overview:  Referral to Derm sent 12/2022--Cancel referral    Apply Clobetasol to region as directed BID  F/u 1 month    2. Candidiasis of skin  Continue Nystatin to affected areas.  Keep skin folds clean and dry.    3. Fall, subsequent encounter  Secondary to ankle instability; advised wearing high top shoes with ankle support or an ankle brace to lend support while ambulating.  Referral for PT for strengthening.     Future Appointments   Date Time Provider Department Center   4/18/2023  3:00 PM Brendan Case, LORE [facility]n [facility]       Follow up in about 4 weeks (around 4/5/2023) for Recheck skin lesion. Call sooner if needed.    Zulma Zhagn, FNP-C    Lab Frequency Next Occurrence

## 2023-03-13 ENCOUNTER — TELEPHONE (OUTPATIENT)
Dept: FAMILY MEDICINE | Facility: CLINIC | Age: 53
End: 2023-03-13
Payer: MEDICAID

## 2023-03-13 DIAGNOSIS — E78.5 HYPERLIPIDEMIA, UNSPECIFIED HYPERLIPIDEMIA TYPE: Primary | ICD-10-CM

## 2023-03-13 DIAGNOSIS — I10 HYPERTENSION, UNSPECIFIED TYPE: ICD-10-CM

## 2023-03-13 DIAGNOSIS — E11.9 TYPE 2 DIABETES MELLITUS WITHOUT COMPLICATION, WITHOUT LONG-TERM CURRENT USE OF INSULIN: ICD-10-CM

## 2023-03-13 RX ORDER — METFORMIN HYDROCHLORIDE 500 MG/1
1000 TABLET, EXTENDED RELEASE ORAL NIGHTLY
Qty: 60 TABLET | Refills: 3 | Status: SHIPPED | OUTPATIENT
Start: 2023-03-13 | End: 2023-07-03

## 2023-03-13 RX ORDER — ATORVASTATIN CALCIUM 20 MG/1
20 TABLET, FILM COATED ORAL DAILY
Qty: 30 TABLET | Refills: 3 | Status: SHIPPED | OUTPATIENT
Start: 2023-03-13 | End: 2023-09-27

## 2023-03-13 RX ORDER — CARVEDILOL 12.5 MG/1
12.5 TABLET ORAL 2 TIMES DAILY
Qty: 60 TABLET | Refills: 3 | Status: SHIPPED | OUTPATIENT
Start: 2023-03-13 | End: 2023-07-03

## 2023-03-13 NOTE — TELEPHONE ENCOUNTER
Keppra 500mg po bid  Coreg 12.5mg po bid  Metformin 1000mg po bid  Lipitor 20mg po daily    [facility]

## 2023-03-27 ENCOUNTER — TELEPHONE (OUTPATIENT)
Dept: FAMILY MEDICINE | Facility: CLINIC | Age: 53
End: 2023-03-27
Payer: MEDICAID

## 2023-03-27 DIAGNOSIS — Z12.31 ENCOUNTER FOR SCREENING MAMMOGRAM FOR MALIGNANT NEOPLASM OF BREAST: Primary | ICD-10-CM

## 2023-03-27 NOTE — TELEPHONE ENCOUNTER
I called and spoke with pt. She said that the last time she had one, it was painful. I educated her on the importance for screening as it can detect cancer earlier. She verbalized understanding and agreed to have one. Order placed

## 2023-03-27 NOTE — TELEPHONE ENCOUNTER
----- Message from Chula Taylor MA sent at 3/21/2023  1:29 PM CDT -----  Regarding: Mammogram due  Our records indicate the patient is due for a mammogram. Please find out if the patient has had one done in the last year. If not, please get it ordered and set up for her.

## 2023-03-29 ENCOUNTER — TELEPHONE (OUTPATIENT)
Dept: FAMILY MEDICINE | Facility: CLINIC | Age: 53
End: 2023-03-29
Payer: MEDICAID

## 2023-03-29 NOTE — TELEPHONE ENCOUNTER
Shows that patient is already taking antifungal terbinafine by mouth. If so she will need to use otc inserts, recommend the 3-5 day coarse

## 2023-03-29 NOTE — TELEPHONE ENCOUNTER
PT called and stated she is on Jardiance and has a yeast infection and asking for Diflucan to be sent to [facility], [place]

## 2023-04-10 ENCOUNTER — OFFICE VISIT (OUTPATIENT)
Dept: FAMILY MEDICINE | Facility: CLINIC | Age: 53
End: 2023-04-10
Payer: MEDICAID

## 2023-04-10 VITALS
DIASTOLIC BLOOD PRESSURE: 78 MMHG | OXYGEN SATURATION: 97 % | HEIGHT: 62 IN | TEMPERATURE: 97 F | SYSTOLIC BLOOD PRESSURE: 110 MMHG | HEART RATE: 84 BPM | BODY MASS INDEX: 33.07 KG/M2 | WEIGHT: 179.69 LBS

## 2023-04-10 DIAGNOSIS — D86.3 CUTANEOUS SARCOIDOSIS: Primary | ICD-10-CM

## 2023-04-10 DIAGNOSIS — E11.9 TYPE 2 DIABETES MELLITUS WITHOUT COMPLICATION, WITHOUT LONG-TERM CURRENT USE OF INSULIN: ICD-10-CM

## 2023-04-10 PROCEDURE — 99214 OFFICE O/P EST MOD 30 MIN: CPT | Mod: ,,, | Performed by: NURSE PRACTITIONER

## 2023-04-10 PROCEDURE — 3078F PR MOST RECENT DIASTOLIC BLOOD PRESSURE < 80 MM HG: ICD-10-PCS | Mod: CPTII,,, | Performed by: NURSE PRACTITIONER

## 2023-04-10 PROCEDURE — 3078F DIAST BP <80 MM HG: CPT | Mod: CPTII,,, | Performed by: NURSE PRACTITIONER

## 2023-04-10 PROCEDURE — 3008F PR BODY MASS INDEX (BMI) DOCUMENTED: ICD-10-PCS | Mod: CPTII,,, | Performed by: NURSE PRACTITIONER

## 2023-04-10 PROCEDURE — 3008F BODY MASS INDEX DOCD: CPT | Mod: CPTII,,, | Performed by: NURSE PRACTITIONER

## 2023-04-10 PROCEDURE — 1159F PR MEDICATION LIST DOCUMENTED IN MEDICAL RECORD: ICD-10-PCS | Mod: CPTII,,, | Performed by: NURSE PRACTITIONER

## 2023-04-10 PROCEDURE — 3074F PR MOST RECENT SYSTOLIC BLOOD PRESSURE < 130 MM HG: ICD-10-PCS | Mod: CPTII,,, | Performed by: NURSE PRACTITIONER

## 2023-04-10 PROCEDURE — 99214 PR OFFICE/OUTPT VISIT, EST, LEVL IV, 30-39 MIN: ICD-10-PCS | Mod: ,,, | Performed by: NURSE PRACTITIONER

## 2023-04-10 PROCEDURE — 1160F RVW MEDS BY RX/DR IN RCRD: CPT | Mod: CPTII,,, | Performed by: NURSE PRACTITIONER

## 2023-04-10 PROCEDURE — 1159F MED LIST DOCD IN RCRD: CPT | Mod: CPTII,,, | Performed by: NURSE PRACTITIONER

## 2023-04-10 PROCEDURE — 3074F SYST BP LT 130 MM HG: CPT | Mod: CPTII,,, | Performed by: NURSE PRACTITIONER

## 2023-04-10 PROCEDURE — 1160F PR REVIEW ALL MEDS BY PRESCRIBER/CLIN PHARMACIST DOCUMENTED: ICD-10-PCS | Mod: CPTII,,, | Performed by: NURSE PRACTITIONER

## 2023-04-10 RX ORDER — SERTRALINE HYDROCHLORIDE 50 MG/1
50 TABLET, FILM COATED ORAL EVERY MORNING
COMMUNITY
Start: 2023-03-13 | End: 2023-04-18

## 2023-04-10 RX ORDER — LANCETS
EACH MISCELLANEOUS
Qty: 50 EACH | Refills: 0 | Status: SHIPPED | OUTPATIENT
Start: 2023-04-10 | End: 2023-11-21

## 2023-04-10 RX ORDER — PREDNISONE 10 MG/1
TABLET ORAL
Qty: 30 TABLET | Refills: 0 | Status: SHIPPED | OUTPATIENT
Start: 2023-04-10 | End: 2023-04-22

## 2023-04-10 RX ORDER — INSULIN PUMP SYRINGE, 3 ML
EACH MISCELLANEOUS
Qty: 1 EACH | Refills: 0 | Status: SHIPPED | OUTPATIENT
Start: 2023-04-10 | End: 2023-11-21

## 2023-04-10 NOTE — PROGRESS NOTES
"Patient ID: Dasha Curiel  : 1970    Chief Complaint: skin lesion (Follow up)    Allergies: Patient is allergic to iodine, meperidine, and promethazine.     History of Present Illness:  The patient is a 53 y.o. Black or  female who presents to clinic for follow up on skin lesion (Follow up)     Recently had skin lesion (left trunk) biopsy which revealed cutaneous sarcoidosis.  Given clobetasol to apply b.i.d. and here today for follow-up. No change in original lesion, now has 2 other lesions on lower abdomen. Also has had an area on RT lower back that began hurting a couple days ago; she feels like it is a spasm or something, denies injury or strain.          Social History:  reports that she has never smoked. She has never been exposed to tobacco smoke. She has never used smokeless tobacco. She reports current alcohol use. She reports that she does not currently use drugs after having used the following drugs: "Crack" cocaine.    Past Medical History:  has a past medical history of Anxiety, CHF (congestive heart failure), CKD (chronic kidney disease), Depression, Diabetes mellitus, type 2, DM (diabetes mellitus), Elevated antinuclear antibody (LLOYD) level, Gout, unspecified, Hypertension, Insomnia, Lumbar radiculopathy, right, and Neuropathy.    Current Medications:  Current Outpatient Medications   Medication Instructions    ADULT LOW DOSE ASPIRIN 81 mg, Oral, Nightly    ALPRAZolam (XANAX) 0.5 mg, Oral, 3 times daily PRN    amLODIPine (NORVASC) 10 mg, Oral, Daily    atorvastatin (LIPITOR) 20 mg, Oral, Daily    blood sugar diagnostic Strp To check BG 1 times daily, to use with insurance preferred meter    blood-glucose meter kit To check BG 1 times daily, to use with insurance preferred meter    carvediloL (COREG) 12.5 mg, Oral, 2 times daily    cetirizine (ZYRTEC) 10 mg, Oral, Daily PRN    clobetasoL (TEMOVATE) 0.05 % cream Topical (Top), 2 times daily    cyclobenzaprine (FLEXERIL) 10 " "mg, Oral, Nightly PRN    DULoxetine (CYMBALTA) 60 mg, Oral, Daily    fluticasone propionate (FLONASE) 50 mcg/actuation nasal spray 1 spray, Each Nostril, 2 times daily    gabapentin (NEURONTIN) 400 MG capsule 3 capsules, Oral, Daily    ibuprofen (ADVIL,MOTRIN) 800 MG tablet TAKE 1 TABLET BY MOUTH EVERY EIGHT HOURS    JARDIANCE 25 mg, Oral, Every morning    lancets Misc To check BG one times daily, to use with insurance preferred meter    levETIRAcetam (KEPPRA) 500 mg, Oral, 2 times daily    metFORMIN (GLUCOPHAGE-XR) 1,000 mg, Oral, Nightly    nitroGLYCERIN (NITROSTAT) 0.4 MG SL tablet 1 TABLET UNDER TONGUE EVERY  5 MINUTES NO MORE THAN 3 TABS THEN GO TO ER FOR CHEST PAIN    nystatin (MYCOSTATIN) ointment Topical (Top), 2 times daily    predniSONE (DELTASONE) 10 MG tablet Take 4 tablets (40 mg total) by mouth once daily for 3 days, THEN 3 tablets (30 mg total) once daily for 3 days, THEN 2 tablets (20 mg total) once daily for 3 days, THEN 1 tablet (10 mg total) once daily for 3 days.    sertraline (ZOLOFT) 50 mg, Oral, Every morning    terbinafine HCL (LAMISIL) 250 mg tablet TAKE 1 TABLET BY MOUTH EVERY DAY for 12 weeks    TRULICITY 0.75 mg, Subcutaneous, Weekly    zolpidem (AMBIEN) 10 mg, Oral, Nightly PRN       Review of Systems   See HPI    Visit Vitals  /78 (BP Location: Left arm, Patient Position: Sitting)   Pulse 84   Temp 97 °F (36.1 °C) (Temporal)   Ht 5' 2" (1.575 m)   Wt 81.5 kg (179 lb 10.8 oz)   SpO2 97%   BMI 32.86 kg/m²       Physical Exam  Constitutional:       Appearance: Normal appearance.   Cardiovascular:      Heart sounds: Normal heart sounds.   Pulmonary:      Breath sounds: Normal breath sounds.   Skin:     General: Skin is warm and dry.      Comments: No change in cutaneous lesion left abdomen; new lesion RT lower abdomen and LT lower abdomen/groin; RT  area with multiple small (1cm) round lesions, flat      Attach pics                    Labs Reviewed:  Chemistry:  Lab Results "   Component Value Date     02/17/2023    K 4.3 02/17/2023    CHLORIDE 105 02/17/2023    BUN 11.0 02/17/2023    CREATININE 0.75 02/17/2023    EGFRNORACEVR >90 02/17/2023    GLUCOSE 166 (H) 02/17/2023    CALCIUM 10.1 02/17/2023    ALKPHOS 129 02/17/2023    LABPROT 7.7 02/17/2023    ALBUMIN 4.5 02/17/2023    BILIDIR 0.10 05/24/2019    IBILI 0.30 05/24/2019    AST 26 02/17/2023    ALT 19 02/17/2023    MG 2.5 (H) 05/23/2019    TSH 1.260 02/17/2023        Lab Results   Component Value Date    HGBA1C 6.6 (H) 02/17/2023        Hematology:  Lab Results   Component Value Date    WBC 8.1 02/17/2023    RBC 4.80 02/17/2023    HGB 13.7 02/17/2023    HCT 43.2 02/17/2023    MCV 90.0 02/17/2023    MCH 28.5 02/17/2023    MCHC 31.7 02/17/2023    RDW 11.9 02/17/2023     02/17/2023    MPV 11.2 02/17/2023       Lipid Panel:  Lab Results   Component Value Date    CHOL 143 02/17/2023    HDL 85 (H) 02/17/2023    DLDL 31.7 02/17/2023    TRIG 140 02/17/2023    TOTALCHOLEST 3.3 05/24/2019        Assessment & Plan:  1. Cutaneous sarcoidosis  Overview:  Referral to Derm sent 12/2022      Recent referral to Dermatology.  Biopsy done at this clinic revealed cutaneous sarcoid [place].  Was treated initially with clobetasol ointment for her single lesion.  Since initiation she has developed multiple other lesions.  Prednisone oral taper now as directed.     -     predniSONE (DELTASONE) 10 MG tablet; Take 4 tablets (40 mg total) by mouth once daily for 3 days, THEN 3 tablets (30 mg total) once daily for 3 days, THEN 2 tablets (20 mg total) once daily for 3 days, THEN 1 tablet (10 mg total) once daily for 3 days.  Dispense: 30 tablet; Refill: 0  -     Ambulatory referral/consult to Dermatology; Future; Expected date: 04/17/2023    2. Type 2 diabetes mellitus without complication, without long-term current use of insulin  Advise that she closely monitor her blood glucose while on prednisone.  Diabetes is well controlled.  Advised to  discontinue steroid and notify us if she sees extreme elevations.    -     blood-glucose meter kit; To check BG 1 times daily, to use with insurance preferred meter  Dispense: 1 each; Refill: 0  -     lancets Misc; To check BG one times daily, to use with insurance preferred meter  Dispense: 50 each; Refill: 0  -     blood sugar diagnostic Strp; To check BG 1 times daily, to use with insurance preferred meter  Dispense: 30 each; Refill: 0         Future Appointments   Date Time Provider Department Center   4/18/2023  3:00 PM LORE Ruiz [facility]   5/1/2023  [facility]       Follow up in about 3 weeks (around 5/1/2023), or if symptoms worsen or fail to improve, for cutaneous sarcoidosis. Call sooner if needed.    CELY Kramer    Lab Frequency Next Occurrence   Ambulatory referral/consult to Physical/Occupational Therapy Once 03/15/2023   Mammo Digital Screening Bilat w/ [facility] Once 03/27/2023

## 2023-04-11 ENCOUNTER — TELEPHONE (OUTPATIENT)
Dept: FAMILY MEDICINE | Facility: CLINIC | Age: 53
End: 2023-04-11
Payer: MEDICAID

## 2023-04-11 NOTE — TELEPHONE ENCOUNTER
"I called [facility] and spoke with Marcia. She said that medicaid covered the True Metrix strips but is saying that the True Metrix meter isn't covered that the formulary is True Met[employer]. I asked her if they could call the number that is given on the rejection. She stated that "when we get time, we will work on it and call her". I called Dasha and notified her. I explained to her that if she doesn't get a call tomorrow to call us and we can send it somewhere else. She verbalized understanding.   "

## 2023-04-18 ENCOUNTER — OFFICE VISIT (OUTPATIENT)
Dept: BEHAVIORAL HEALTH | Facility: CLINIC | Age: 53
End: 2023-04-18
Payer: MEDICAID

## 2023-04-18 VITALS
WEIGHT: 178 LBS | HEART RATE: 91 BPM | OXYGEN SATURATION: 99 % | HEIGHT: 62 IN | BODY MASS INDEX: 32.76 KG/M2 | DIASTOLIC BLOOD PRESSURE: 88 MMHG | TEMPERATURE: 98 F | SYSTOLIC BLOOD PRESSURE: 130 MMHG

## 2023-04-18 DIAGNOSIS — G47.00 INSOMNIA, UNSPECIFIED TYPE: ICD-10-CM

## 2023-04-18 DIAGNOSIS — F41.1 GENERALIZED ANXIETY DISORDER: ICD-10-CM

## 2023-04-18 DIAGNOSIS — F33.9 EPISODE OF RECURRENT MAJOR DEPRESSIVE DISORDER, UNSPECIFIED DEPRESSION EPISODE SEVERITY: Primary | ICD-10-CM

## 2023-04-18 PROCEDURE — 99214 PR OFFICE/OUTPT VISIT, EST, LEVL IV, 30-39 MIN: ICD-10-PCS | Mod: ,,, | Performed by: NURSE PRACTITIONER

## 2023-04-18 PROCEDURE — 3075F PR MOST RECENT SYSTOLIC BLOOD PRESS GE 130-139MM HG: ICD-10-PCS | Mod: CPTII,,, | Performed by: NURSE PRACTITIONER

## 2023-04-18 PROCEDURE — 3008F PR BODY MASS INDEX (BMI) DOCUMENTED: ICD-10-PCS | Mod: CPTII,,, | Performed by: NURSE PRACTITIONER

## 2023-04-18 PROCEDURE — 99214 OFFICE O/P EST MOD 30 MIN: CPT | Mod: ,,, | Performed by: NURSE PRACTITIONER

## 2023-04-18 PROCEDURE — 1159F MED LIST DOCD IN RCRD: CPT | Mod: CPTII,,, | Performed by: NURSE PRACTITIONER

## 2023-04-18 PROCEDURE — 1160F PR REVIEW ALL MEDS BY PRESCRIBER/CLIN PHARMACIST DOCUMENTED: ICD-10-PCS | Mod: CPTII,,, | Performed by: NURSE PRACTITIONER

## 2023-04-18 PROCEDURE — 1160F RVW MEDS BY RX/DR IN RCRD: CPT | Mod: CPTII,,, | Performed by: NURSE PRACTITIONER

## 2023-04-18 PROCEDURE — 1159F PR MEDICATION LIST DOCUMENTED IN MEDICAL RECORD: ICD-10-PCS | Mod: CPTII,,, | Performed by: NURSE PRACTITIONER

## 2023-04-18 PROCEDURE — 3079F DIAST BP 80-89 MM HG: CPT | Mod: CPTII,,, | Performed by: NURSE PRACTITIONER

## 2023-04-18 PROCEDURE — 3008F BODY MASS INDEX DOCD: CPT | Mod: CPTII,,, | Performed by: NURSE PRACTITIONER

## 2023-04-18 PROCEDURE — 3075F SYST BP GE 130 - 139MM HG: CPT | Mod: CPTII,,, | Performed by: NURSE PRACTITIONER

## 2023-04-18 PROCEDURE — 3079F PR MOST RECENT DIASTOLIC BLOOD PRESSURE 80-89 MM HG: ICD-10-PCS | Mod: CPTII,,, | Performed by: NURSE PRACTITIONER

## 2023-04-18 RX ORDER — ALPRAZOLAM 0.5 MG/1
0.5 TABLET ORAL 3 TIMES DAILY PRN
Qty: 90 TABLET | Refills: 1 | Status: SHIPPED | OUTPATIENT
Start: 2023-04-18 | End: 2023-06-21 | Stop reason: SDUPTHER

## 2023-04-18 RX ORDER — ZOLPIDEM TARTRATE 10 MG/1
10 TABLET ORAL NIGHTLY PRN
Qty: 30 TABLET | Refills: 1 | Status: SHIPPED | OUTPATIENT
Start: 2023-04-18 | End: 2023-06-21 | Stop reason: SDUPTHER

## 2023-04-18 RX ORDER — DULOXETIN HYDROCHLORIDE 60 MG/1
60 CAPSULE, DELAYED RELEASE ORAL DAILY
Qty: 30 CAPSULE | Refills: 1 | Status: SHIPPED | OUTPATIENT
Start: 2023-04-18 | End: 2023-06-21 | Stop reason: SDUPTHER

## 2023-04-18 NOTE — PROGRESS NOTES
"PSYCHIATRIC FOLLOW-UP VISIT NOTE    Chief Complaint   Patient presents with    Mood     "I have no complaints."         History of Present Illness  53 y.o. year old Black or  female with hx of MDD, CATARINO, and insomnia seen today for follow-up appointment and medication management.  Reports she is doing well on current medication regimen.  Denies any recent depression.  No anhedonia, isolative behaviors, low energy, feelings of guilt, difficulty concentrating, or depressed mood.  Anxiety has also well-controlled with current medication regimen.  No recent panic attacks.  Finds that she is able to relax easily and is not easily overwhelmed.  She is sleeping well at night and feels rested in the morning.  Denies any residual morning grogginess.  Overall she is without any acute complaints today and denies any side effects from current medication regimen. Patient denies SI/HI. Denies hallucinations and does not appear to be responding to internal stimuli or be internally preoccupied. No manic symptoms noted.       Objective:     Vitals:  Vitals:    04/18/23 1507   BP: 130/88   BP Location: Left arm   Patient Position: Sitting   Pulse: 91   Temp: 97.7 °F (36.5 °C)   TempSrc: Temporal   SpO2: 99%   Weight: 80.7 kg (178 lb)   Height: 5' 2" (1.575 m)       Wt Readings from Last 3 Encounters:   04/18/23 1507 80.7 kg (178 lb)   04/10/23 1309 81.5 kg (179 lb 10.8 oz)   03/08/23 1302 81.6 kg (180 lb)         Medication:    Current Outpatient Medications:     ADULT LOW DOSE ASPIRIN 81 mg EC tablet, Take 81 mg by mouth nightly., Disp: , Rfl:     amLODIPine (NORVASC) 10 MG tablet, Take 10 mg by mouth Daily., Disp: , Rfl:     atorvastatin (LIPITOR) 20 MG tablet, Take 1 tablet (20 mg total) by mouth Daily., Disp: 30 tablet, Rfl: 3    blood sugar diagnostic Strp, To check BG 1 times daily, to use with insurance preferred meter, Disp: 30 each, Rfl: 0    blood-glucose meter kit, To check BG 1 times daily, to use with " insurance preferred meter, Disp: 1 each, Rfl: 0    carvediloL (COREG) 12.5 MG tablet, Take 1 tablet (12.5 mg total) by mouth 2 (two) times daily., Disp: 60 tablet, Rfl: 3    cetirizine (ZYRTEC) 10 MG tablet, Take 10 mg by mouth daily as needed., Disp: , Rfl:     clobetasoL (TEMOVATE) 0.05 % cream, Apply topically 2 (two) times daily., Disp: 60 g, Rfl: 2    cyclobenzaprine (FLEXERIL) 10 MG tablet, Take 1 tablet (10 mg total) by mouth nightly as needed for Muscle spasms., Disp: 30 tablet, Rfl: 2    fluticasone propionate (FLONASE) 50 mcg/actuation nasal spray, 1 spray by Each Nostril route 2 (two) times daily., Disp: , Rfl:     gabapentin (NEURONTIN) 400 MG capsule, Take 3 capsules by mouth Daily., Disp: , Rfl:     ibuprofen (ADVIL,MOTRIN) 800 MG tablet, TAKE 1 TABLET BY MOUTH EVERY EIGHT HOURS, Disp: 30 tablet, Rfl: 1    JARDIANCE 25 mg tablet, Take 25 mg by mouth every morning., Disp: , Rfl:     lancets Misc, To check BG one times daily, to use with insurance preferred meter, Disp: 50 each, Rfl: 0    levETIRAcetam (KEPPRA) 500 MG Tab, Take 1 tablet (500 mg total) by mouth 2 (two) times daily., Disp: 60 tablet, Rfl: 11    metFORMIN (GLUCOPHAGE-XR) 500 MG ER 24hr tablet, Take 2 tablets (1,000 mg total) by mouth nightly., Disp: 60 tablet, Rfl: 3    nitroGLYCERIN (NITROSTAT) 0.4 MG SL tablet, 1 TABLET UNDER TONGUE EVERY  5 MINUTES NO MORE THAN 3 TABS THEN GO TO ER FOR CHEST PAIN, Disp: 25 tablet, Rfl: 2    nystatin (MYCOSTATIN) ointment, Apply topically 2 (two) times daily., Disp: 30 g, Rfl: 2    predniSONE (DELTASONE) 10 MG tablet, Take 4 tablets (40 mg total) by mouth once daily for 3 days, THEN 3 tablets (30 mg total) once daily for 3 days, THEN 2 tablets (20 mg total) once daily for 3 days, THEN 1 tablet (10 mg total) once daily for 3 days., Disp: 30 tablet, Rfl: 0    terbinafine HCL (LAMISIL) 250 mg tablet, TAKE 1 TABLET BY MOUTH EVERY DAY for 12 weeks, Disp: 84 tablet, Rfl: 0    TRULICITY 0.75 mg/0.5 mL pen  "injector, Inject 0.75 mg into the skin once a week., Disp: , Rfl:     ALPRAZolam (XANAX) 0.5 MG tablet, Take 1 tablet (0.5 mg total) by mouth 3 (three) times daily as needed for Anxiety., Disp: 90 tablet, Rfl: 1    DULoxetine (CYMBALTA) 60 MG capsule, Take 1 capsule (60 mg total) by mouth Daily., Disp: 30 capsule, Rfl: 1    zolpidem (AMBIEN) 10 mg Tab, Take 1 tablet (10 mg total) by mouth nightly as needed., Disp: 30 tablet, Rfl: 1       Significant Labs: - none at this time    Significant Imaging: - none at this time    Physical Exam  Vitals and nursing note reviewed.   Constitutional:       General: She is awake.      Appearance: Normal appearance.   Musculoskeletal:      Comments: Full ROM   Neurological:      Mental Status: She is alert.   Psychiatric:         Attention and Perception: Attention and perception normal. She does not perceive auditory or visual hallucinations.         Mood and Affect: Affect normal.         Speech: Speech normal.         Behavior: Behavior is cooperative.         Thought Content: Thought content does not include homicidal or suicidal ideation.         Cognition and Memory: Cognition and memory normal.        Review of Systems     Mental Status Exam:  Presentation:  - Appearance: 53 y.o. year old Black or  female, appears stated age, appears Casually dressed and Well groomed  - Motility: Erect when standing, Steady gait, No EPS or Tremors, No psychomotor agitation or retardation appreciated  - Behavior: calm, cooperative, good eye contact  Speech:  - Character/Organization: spontaneous, fluent, normal volume, normal rate, normal rhythm  Emotional State:  - Mood: "happy"   - Affect: congruent and appropriate  Thought:  - Process: logical, linear, organized , goal-directed  - Preoccupations: no ruminations, rituals, or phobias appreciated  - Delusions: no persecutory, paranoid, or grandiose delusions appreciated  - Perception: denies AVH, not actively responding to " internal stimuli  - SI/HI: denies/denies  Sensorium & Intellect:  - Sensorium: AAOx4  - Memory: intact to recent and remote events  - Attention/Concentration: good/good  - Insight/Judgement: good/good    Gait: normal swing and stance  MSK:no rigidity appreciated    All other systems without acute issues unless noted in HPI      Assessment/Plan      ICD-10-CM ICD-9-CM    1. Episode of recurrent major depressive disorder, unspecified depression episode severity  F33.9 296.30 DULoxetine (CYMBALTA) 60 MG capsule      2. Generalized anxiety disorder  F41.1 300.02 ALPRAZolam (XANAX) 0.5 MG tablet      DULoxetine (CYMBALTA) 60 MG capsule      3. Insomnia, unspecified type  G47.00 780.52 zolpidem (AMBIEN) 10 mg Tab         Continue current medications without change     checked. Results as expected. No suspected diversion or abuse of controlled substances.    Potential side effects and risks vs benefits of current treatment plan reviewed with patient. Applicable black box warnings reviewed. Encouraged patient not to alter dosages or abruptly discontinue medications without contacting prescriber first, due to risk of worsening symptoms and decompensation of mental status. Warned of risks associated with herbal remedies and supplements while taking psychotropic medications and of the need to consult prescriber prior to adding any of these to current regimen. Patient should abstain from abuse of alcohol, prescription medications, and illicit drugs. Reviewed when to contact clinic and/or seek emergent care, such as but not limited to, onset/worsening SI/HI, hallucinations, delusions, manic symptoms. Pt verbalized understanding and agreement of these warnings/recommendations and verbally consented to treatment plan.      Follow up in about 2 months (around 6/18/2023) for Medication Management.    Brendan Case, JUSTICEP

## 2023-04-26 ENCOUNTER — DOCUMENTATION ONLY (OUTPATIENT)
Dept: ADMINISTRATIVE | Facility: HOSPITAL | Age: 53
End: 2023-04-26
Payer: MEDICAID

## 2023-04-26 NOTE — LETTER
AUTHORIZATION FOR RELEASE OF   CONFIDENTIAL INFORMATION    Dear Dr. Sanz,    We are seeing Dasha Curiel, date of birth 1970, in the clinic at [facility]. CELY Kramer is the patient's PCP. Dasha Curiel has an outstanding lab/procedure at the time we reviewed her chart. In order to help keep her health information updated, she has authorized us to request the following medical record(s):        (  )  MAMMOGRAM                                      ( x )  COLONOSCOPY      (  )  PAP SMEAR                                          (  )  OUTSIDE LAB RESULTS     (  )  DEXA SCAN                                          (  )  EYE EXAM            (  )  FOOT EXAM                                          (  )  ENTIRE RECORD     (  )  OUTSIDE IMMUNIZATIONS                 (  )  _______________         Please fax records to [facility], Deana L Knighten, FNP-C, [phone].           Patient Name: Dasha Curiel  : 1970  Patient Phone #: [phone]

## 2023-04-26 NOTE — PROGRESS NOTES
[facility] Chart Review. Reviewed for Colorectal Cancer Screening. Requested notes from: Dr. Sanz.    If you have any questions please give me a call.  Chula Taylor MA-Panel Care Coordinator at [phone].

## 2023-05-01 ENCOUNTER — PATIENT MESSAGE (OUTPATIENT)
Dept: FAMILY MEDICINE | Facility: CLINIC | Age: 53
End: 2023-05-01

## 2023-05-12 ENCOUNTER — OFFICE VISIT (OUTPATIENT)
Dept: FAMILY MEDICINE | Facility: CLINIC | Age: 53
End: 2023-05-12
Payer: MEDICAID

## 2023-05-12 VITALS
TEMPERATURE: 98 F | BODY MASS INDEX: 33.24 KG/M2 | HEART RATE: 88 BPM | DIASTOLIC BLOOD PRESSURE: 82 MMHG | SYSTOLIC BLOOD PRESSURE: 128 MMHG | HEIGHT: 62 IN | OXYGEN SATURATION: 98 % | WEIGHT: 180.63 LBS

## 2023-05-12 DIAGNOSIS — E11.9 TYPE 2 DIABETES MELLITUS WITHOUT COMPLICATION, WITHOUT LONG-TERM CURRENT USE OF INSULIN: ICD-10-CM

## 2023-05-12 DIAGNOSIS — I10 HYPERTENSION, UNSPECIFIED TYPE: ICD-10-CM

## 2023-05-12 DIAGNOSIS — E78.5 HYPERLIPIDEMIA, UNSPECIFIED HYPERLIPIDEMIA TYPE: ICD-10-CM

## 2023-05-12 DIAGNOSIS — D86.3 CUTANEOUS SARCOIDOSIS: Primary | ICD-10-CM

## 2023-05-12 PROBLEM — R76.8 ELEVATED ANTINUCLEAR ANTIBODY (ANA) LEVEL: Status: ACTIVE | Noted: 2023-05-12

## 2023-05-12 PROBLEM — B35.1 ONYCHOMYCOSIS OF TOENAIL: Status: ACTIVE | Noted: 2023-05-12

## 2023-05-12 PROBLEM — F41.9 ANXIETY: Status: ACTIVE | Noted: 2023-05-12

## 2023-05-12 PROBLEM — G40.909 SEIZURE DISORDER: Status: ACTIVE | Noted: 2023-05-12

## 2023-05-12 PROBLEM — F32.A DEPRESSIVE DISORDER: Status: ACTIVE | Noted: 2023-05-12

## 2023-05-12 PROBLEM — N18.9 CHRONIC KIDNEY DISEASE: Status: ACTIVE | Noted: 2023-05-12

## 2023-05-12 PROBLEM — G62.9 NEUROPATHY: Status: ACTIVE | Noted: 2023-05-12

## 2023-05-12 PROBLEM — I50.9 CONGESTIVE HEART FAILURE: Status: ACTIVE | Noted: 2023-05-12

## 2023-05-12 PROBLEM — E66.9 OBESITY: Status: ACTIVE | Noted: 2023-05-12

## 2023-05-12 PROBLEM — M10.9 GOUT: Status: ACTIVE | Noted: 2023-05-12

## 2023-05-12 PROCEDURE — 1159F PR MEDICATION LIST DOCUMENTED IN MEDICAL RECORD: ICD-10-PCS | Mod: CPTII,,, | Performed by: NURSE PRACTITIONER

## 2023-05-12 PROCEDURE — 99213 PR OFFICE/OUTPT VISIT, EST, LEVL III, 20-29 MIN: ICD-10-PCS | Mod: ,,, | Performed by: NURSE PRACTITIONER

## 2023-05-12 PROCEDURE — 3074F SYST BP LT 130 MM HG: CPT | Mod: CPTII,,, | Performed by: NURSE PRACTITIONER

## 2023-05-12 PROCEDURE — 3074F PR MOST RECENT SYSTOLIC BLOOD PRESSURE < 130 MM HG: ICD-10-PCS | Mod: CPTII,,, | Performed by: NURSE PRACTITIONER

## 2023-05-12 PROCEDURE — 1159F MED LIST DOCD IN RCRD: CPT | Mod: CPTII,,, | Performed by: NURSE PRACTITIONER

## 2023-05-12 PROCEDURE — 3008F PR BODY MASS INDEX (BMI) DOCUMENTED: ICD-10-PCS | Mod: CPTII,,, | Performed by: NURSE PRACTITIONER

## 2023-05-12 PROCEDURE — 1160F RVW MEDS BY RX/DR IN RCRD: CPT | Mod: CPTII,,, | Performed by: NURSE PRACTITIONER

## 2023-05-12 PROCEDURE — 99213 OFFICE O/P EST LOW 20 MIN: CPT | Mod: ,,, | Performed by: NURSE PRACTITIONER

## 2023-05-12 PROCEDURE — 3008F BODY MASS INDEX DOCD: CPT | Mod: CPTII,,, | Performed by: NURSE PRACTITIONER

## 2023-05-12 PROCEDURE — 1160F PR REVIEW ALL MEDS BY PRESCRIBER/CLIN PHARMACIST DOCUMENTED: ICD-10-PCS | Mod: CPTII,,, | Performed by: NURSE PRACTITIONER

## 2023-05-12 PROCEDURE — 3079F DIAST BP 80-89 MM HG: CPT | Mod: CPTII,,, | Performed by: NURSE PRACTITIONER

## 2023-05-12 PROCEDURE — 3079F PR MOST RECENT DIASTOLIC BLOOD PRESSURE 80-89 MM HG: ICD-10-PCS | Mod: CPTII,,, | Performed by: NURSE PRACTITIONER

## 2023-05-12 RX ORDER — SERTRALINE HYDROCHLORIDE 50 MG/1
50 TABLET, FILM COATED ORAL EVERY MORNING
COMMUNITY
Start: 2023-05-08 | End: 2023-06-21 | Stop reason: SDUPTHER

## 2023-05-12 NOTE — PROGRESS NOTES
"Patient ID: Dasha Curiel  : 1970    Chief Complaint: cutaneous sarcoidosis    Allergies: Patient is allergic to iodine, meperidine, and promethazine.     History of Present Illness:  The patient is a 53 y.o. Black or  female who presents to clinic for follow up on cutaneous sarcoidosis     Had a skin lesion that was biopsied and revealed cutaneous sarcoidosis.  Initially given clobetasol for the single lesion.  However at follow-up a couple of other lesions had presented on her lower abdomen.  She was then a steroid taper and returns today for follow-up. She has not developed any new lesions but the lesions that were present are still there.     Referral was sent to Dermatology in April but she has not heard from anyone to schedule an appointment yet.      Social History:  reports that she has never smoked. She has never been exposed to tobacco smoke. She has never used smokeless tobacco. She reports current alcohol use. She reports that she does not currently use drugs after having used the following drugs: "Crack" cocaine.    Past Medical History:  has a past medical history of Anxiety, CHF (congestive heart failure), CKD (chronic kidney disease), Depression, Diabetes mellitus, type 2, DM (diabetes mellitus), Elevated antinuclear antibody (LLOYD) level, Gout, unspecified, Hypertension, Insomnia, Lumbar radiculopathy, right, and Neuropathy.    Current Medications:  Current Outpatient Medications   Medication Instructions    ADULT LOW DOSE ASPIRIN 81 mg, Oral, Nightly    ALPRAZolam (XANAX) 0.5 mg, Oral, 3 times daily PRN    amLODIPine (NORVASC) 10 mg, Oral, Daily    atorvastatin (LIPITOR) 20 mg, Oral, Daily    blood sugar diagnostic Strp To check BG 1 times daily, to use with insurance preferred meter    blood-glucose meter kit To check BG 1 times daily, to use with insurance preferred meter    carvediloL (COREG) 12.5 mg, Oral, 2 times daily    cetirizine (ZYRTEC) 10 mg, Oral, Daily PRN    " "clobetasoL (TEMOVATE) 0.05 % cream Topical (Top), 2 times daily    cyclobenzaprine (FLEXERIL) 10 mg, Oral, Nightly PRN    DULoxetine (CYMBALTA) 60 mg, Oral, Daily    fluticasone propionate (FLONASE) 50 mcg/actuation nasal spray 1 spray, Each Nostril, 2 times daily    gabapentin (NEURONTIN) 400 MG capsule 3 capsules, Oral, Daily    ibuprofen (ADVIL,MOTRIN) 800 MG tablet TAKE 1 TABLET BY MOUTH EVERY EIGHT HOURS    JARDIANCE 25 mg, Oral, Every morning    lancets Misc To check BG one times daily, to use with insurance preferred meter    levETIRAcetam (KEPPRA) 500 mg, Oral, 2 times daily    metFORMIN (GLUCOPHAGE-XR) 1,000 mg, Oral, Nightly    nitroGLYCERIN (NITROSTAT) 0.4 MG SL tablet 1 TABLET UNDER TONGUE EVERY  5 MINUTES NO MORE THAN 3 TABS THEN GO TO ER FOR CHEST PAIN    nystatin (MYCOSTATIN) ointment Topical (Top), 2 times daily    sertraline (ZOLOFT) 50 mg, Oral, Every morning    terbinafine HCL (LAMISIL) 250 mg tablet TAKE 1 TABLET BY MOUTH EVERY DAY for 12 weeks    TRULICITY 0.75 mg, Subcutaneous, Weekly    zolpidem (AMBIEN) 10 mg, Oral, Nightly PRN       Review of Systems   See HPI    Visit Vitals  /82   Pulse 88   Temp 98.1 °F (36.7 °C) (Temporal)   Ht 5' 2" (1.575 m)   Wt 81.9 kg (180 lb 9.6 oz)   SpO2 98%   BMI 33.03 kg/m²       Physical Exam  Vitals reviewed.   Constitutional:       Appearance: Normal appearance.   Cardiovascular:      Heart sounds: Normal heart sounds.   Pulmonary:      Breath sounds: Normal breath sounds.   Skin:     General: Skin is warm and dry.      Comments: No changes in cutaneous lesion left abdomen; has lesion to RT lower and LT lower abdomen/groin   Neurological:      Mental Status: She is oriented to person, place, and time.                    Labs Reviewed:  Chemistry:  Lab Results   Component Value Date     02/17/2023    K 4.3 02/17/2023    CHLORIDE 105 02/17/2023    BUN 11.0 02/17/2023    CREATININE 0.75 02/17/2023    EGFRNORACEVR >90 02/17/2023    GLUCOSE 166 (H) " 02/17/2023    CALCIUM 10.1 02/17/2023    ALKPHOS 129 02/17/2023    LABPROT 7.7 02/17/2023    ALBUMIN 4.5 02/17/2023    BILIDIR 0.10 05/24/2019    IBILI 0.30 05/24/2019    AST 26 02/17/2023    ALT 19 02/17/2023    MG 2.5 (H) 05/23/2019    TSH 1.260 02/17/2023        Lab Results   Component Value Date    HGBA1C 6.6 (H) 02/17/2023        Hematology:  Lab Results   Component Value Date    WBC 8.1 02/17/2023    RBC 4.80 02/17/2023    HGB 13.7 02/17/2023    HCT 43.2 02/17/2023    MCV 90.0 02/17/2023    MCH 28.5 02/17/2023    MCHC 31.7 02/17/2023    RDW 11.9 02/17/2023     02/17/2023    MPV 11.2 02/17/2023       Lipid Panel:  Lab Results   Component Value Date    CHOL 143 02/17/2023    HDL 85 (H) 02/17/2023    DLDL 31.7 02/17/2023    TRIG 140 02/17/2023    TOTALCHOLEST 3.3 05/24/2019        Assessment & Plan:  1. Cutaneous sarcoidosis  Overview:  Referral to Derm sent 12/2022           Future Appointments   Date Time Provider Department Center   6/13/2023  3:00 PM Brendan Case, PMHNP [facility]       Follow up for 6 month routine, labs prior. Call sooner if needed.    JESUS KramerP-C    Lab Frequency Next Occurrence   Ambulatory referral/consult to Physical/Occupational Therapy Once 03/15/2023   Mammo Digital Screening Bilat w/ [facility] Once 03/27/2023   Ambulatory referral/consult to Dermatology Once 04/17/2023

## 2023-05-15 ENCOUNTER — TELEPHONE (OUTPATIENT)
Dept: FAMILY MEDICINE | Facility: CLINIC | Age: 53
End: 2023-05-15
Payer: MEDICAID

## 2023-05-15 DIAGNOSIS — Z12.11 COLON CANCER SCREENING: Primary | ICD-10-CM

## 2023-05-15 NOTE — TELEPHONE ENCOUNTER
I called and spoke with her. She said that she has not had one so she would like to have a colonoscopy and requested Dr. Ovalle.

## 2023-05-16 DIAGNOSIS — M54.16 LUMBAR RADICULOPATHY: ICD-10-CM

## 2023-05-16 DIAGNOSIS — M62.838 MUSCLE SPASM: ICD-10-CM

## 2023-05-16 NOTE — TELEPHONE ENCOUNTER
----- Message from Mike Flor sent at 5/16/2023  2:40 PM CDT -----  Regarding: refill  Pt stated that at her last appt. She was suppose to have refills of ibuprofen and flexeril    [facility]    [phone]

## 2023-05-18 RX ORDER — IBUPROFEN 800 MG/1
TABLET ORAL
Qty: 30 TABLET | Refills: 1 | Status: SHIPPED | OUTPATIENT
Start: 2023-05-18 | End: 2023-07-03

## 2023-05-18 RX ORDER — CYCLOBENZAPRINE HCL 10 MG
10 TABLET ORAL NIGHTLY PRN
Qty: 30 TABLET | Refills: 2 | Status: SHIPPED | OUTPATIENT
Start: 2023-05-18 | End: 2023-08-16

## 2023-05-30 ENCOUNTER — PATIENT MESSAGE (OUTPATIENT)
Dept: ADMINISTRATIVE | Facility: HOSPITAL | Age: 53
End: 2023-05-30
Payer: MEDICAID

## 2023-06-07 ENCOUNTER — TELEPHONE (OUTPATIENT)
Dept: FAMILY MEDICINE | Facility: CLINIC | Age: 53
End: 2023-06-07
Payer: MEDICAID

## 2023-06-07 NOTE — TELEPHONE ENCOUNTER
----- Message from Mike Flor sent at 6/7/2023  8:12 AM CDT -----  Regarding: general  Called pt so schedule lab appt. Pt. Stated that it looks like the medication is working and she said that she will decline on doing the labs.

## 2023-06-21 ENCOUNTER — OFFICE VISIT (OUTPATIENT)
Dept: BEHAVIORAL HEALTH | Facility: CLINIC | Age: 53
End: 2023-06-21
Payer: MEDICAID

## 2023-06-21 VITALS
TEMPERATURE: 98 F | SYSTOLIC BLOOD PRESSURE: 138 MMHG | HEIGHT: 62 IN | WEIGHT: 181 LBS | DIASTOLIC BLOOD PRESSURE: 92 MMHG | BODY MASS INDEX: 33.31 KG/M2 | HEART RATE: 92 BPM

## 2023-06-21 DIAGNOSIS — F33.9 EPISODE OF RECURRENT MAJOR DEPRESSIVE DISORDER, UNSPECIFIED DEPRESSION EPISODE SEVERITY: Primary | ICD-10-CM

## 2023-06-21 DIAGNOSIS — G47.00 INSOMNIA, UNSPECIFIED TYPE: ICD-10-CM

## 2023-06-21 DIAGNOSIS — F41.1 GENERALIZED ANXIETY DISORDER: ICD-10-CM

## 2023-06-21 PROCEDURE — 3080F PR MOST RECENT DIASTOLIC BLOOD PRESSURE >= 90 MM HG: ICD-10-PCS | Mod: CPTII,,, | Performed by: NURSE PRACTITIONER

## 2023-06-21 PROCEDURE — 3008F BODY MASS INDEX DOCD: CPT | Mod: CPTII,,, | Performed by: NURSE PRACTITIONER

## 2023-06-21 PROCEDURE — 1159F MED LIST DOCD IN RCRD: CPT | Mod: CPTII,,, | Performed by: NURSE PRACTITIONER

## 2023-06-21 PROCEDURE — 3075F PR MOST RECENT SYSTOLIC BLOOD PRESS GE 130-139MM HG: ICD-10-PCS | Mod: CPTII,,, | Performed by: NURSE PRACTITIONER

## 2023-06-21 PROCEDURE — 3080F DIAST BP >= 90 MM HG: CPT | Mod: CPTII,,, | Performed by: NURSE PRACTITIONER

## 2023-06-21 PROCEDURE — 99214 PR OFFICE/OUTPT VISIT, EST, LEVL IV, 30-39 MIN: ICD-10-PCS | Mod: ,,, | Performed by: NURSE PRACTITIONER

## 2023-06-21 PROCEDURE — 99214 OFFICE O/P EST MOD 30 MIN: CPT | Mod: ,,, | Performed by: NURSE PRACTITIONER

## 2023-06-21 PROCEDURE — 3075F SYST BP GE 130 - 139MM HG: CPT | Mod: CPTII,,, | Performed by: NURSE PRACTITIONER

## 2023-06-21 PROCEDURE — 1160F PR REVIEW ALL MEDS BY PRESCRIBER/CLIN PHARMACIST DOCUMENTED: ICD-10-PCS | Mod: CPTII,,, | Performed by: NURSE PRACTITIONER

## 2023-06-21 PROCEDURE — 1159F PR MEDICATION LIST DOCUMENTED IN MEDICAL RECORD: ICD-10-PCS | Mod: CPTII,,, | Performed by: NURSE PRACTITIONER

## 2023-06-21 PROCEDURE — 1160F RVW MEDS BY RX/DR IN RCRD: CPT | Mod: CPTII,,, | Performed by: NURSE PRACTITIONER

## 2023-06-21 PROCEDURE — 3008F PR BODY MASS INDEX (BMI) DOCUMENTED: ICD-10-PCS | Mod: CPTII,,, | Performed by: NURSE PRACTITIONER

## 2023-06-21 RX ORDER — SERTRALINE HYDROCHLORIDE 50 MG/1
50 TABLET, FILM COATED ORAL EVERY MORNING
Qty: 30 TABLET | Refills: 1 | Status: SHIPPED | OUTPATIENT
Start: 2023-06-21 | End: 2023-08-16

## 2023-06-21 RX ORDER — ALPRAZOLAM 0.5 MG/1
0.5 TABLET ORAL 3 TIMES DAILY PRN
Qty: 90 TABLET | Refills: 1 | Status: SHIPPED | OUTPATIENT
Start: 2023-06-21 | End: 2023-08-16 | Stop reason: SDUPTHER

## 2023-06-21 RX ORDER — DULOXETIN HYDROCHLORIDE 60 MG/1
60 CAPSULE, DELAYED RELEASE ORAL DAILY
Qty: 30 CAPSULE | Refills: 1 | Status: SHIPPED | OUTPATIENT
Start: 2023-06-21 | End: 2023-08-16 | Stop reason: SDUPTHER

## 2023-06-21 RX ORDER — ZOLPIDEM TARTRATE 10 MG/1
10 TABLET ORAL NIGHTLY PRN
Qty: 30 TABLET | Refills: 1 | Status: SHIPPED | OUTPATIENT
Start: 2023-06-21 | End: 2023-08-16 | Stop reason: SDUPTHER

## 2023-06-21 NOTE — PROGRESS NOTES
"PSYCHIATRIC FOLLOW-UP VISIT NOTE    Chief Complaint   Patient presents with    Anxiety     "I have no complaints."         History of Present Illness  53 y.o. year old Black or  female with hx of MDD, scot, and insomnia seen today for follow-up appointment and medication management.  Reports she is been doing very well on current medication regimen.  Denies any recent depression.  No isolative behaviors, feelings of guilt or shame, hopelessness or helplessness, depressed mood, appetite changes, sleep disturbances, anhedonia, or thoughts of self-harm.  Anxiety also well-controlled with current medication regimen, including p.r.n. meds.  She is sleeping well and feels rested in the morning.  Averages 8 hours of sleep per night.  She has been spending more time with family and friends and has been enjoying being more social.   checked.  No suspected diversion or abuse.  Denies any side effects from current medication regimen. Patient denies SI/HI. Denies hallucinations and does not appear to be responding to internal stimuli or be internally preoccupied. No manic symptoms noted.      Objective:     Vitals:  Vitals:    06/21/23 1306   BP: (!) 138/92   BP Location: Left arm   Patient Position: Sitting   Pulse: 92   Temp: 97.9 °F (36.6 °C)   TempSrc: Temporal   Weight: 82.1 kg (181 lb)   Height: 5' 2" (1.575 m)       Wt Readings from Last 3 Encounters:   06/21/23 1306 82.1 kg (181 lb)   05/12/23 1321 81.9 kg (180 lb 9.6 oz)   04/18/23 1507 80.7 kg (178 lb)         Medication:    Current Outpatient Medications:     ADULT LOW DOSE ASPIRIN 81 mg EC tablet, Take 81 mg by mouth nightly., Disp: , Rfl:     amLODIPine (NORVASC) 10 MG tablet, Take 10 mg by mouth Daily., Disp: , Rfl:     atorvastatin (LIPITOR) 20 MG tablet, Take 1 tablet (20 mg total) by mouth Daily., Disp: 30 tablet, Rfl: 3    blood sugar diagnostic Strp, To check BG 1 times daily, to use with insurance preferred meter, Disp: 30 each, Rfl: 0    " blood-glucose meter kit, To check BG 1 times daily, to use with insurance preferred meter, Disp: 1 each, Rfl: 0    carvediloL (COREG) 12.5 MG tablet, Take 1 tablet (12.5 mg total) by mouth 2 (two) times daily., Disp: 60 tablet, Rfl: 3    cetirizine (ZYRTEC) 10 MG tablet, Take 10 mg by mouth daily as needed., Disp: , Rfl:     clobetasoL (TEMOVATE) 0.05 % cream, Apply topically 2 (two) times daily., Disp: 60 g, Rfl: 2    cyclobenzaprine (FLEXERIL) 10 MG tablet, Take 1 tablet (10 mg total) by mouth nightly as needed for Muscle spasms., Disp: 30 tablet, Rfl: 2    fluticasone propionate (FLONASE) 50 mcg/actuation nasal spray, 1 spray by Each Nostril route 2 (two) times daily., Disp: , Rfl:     gabapentin (NEURONTIN) 400 MG capsule, Take 3 capsules by mouth Daily., Disp: , Rfl:     ibuprofen (ADVIL,MOTRIN) 800 MG tablet, TAKE 1 TABLET BY MOUTH EVERY EIGHT HOURS, Disp: 30 tablet, Rfl: 1    JARDIANCE 25 mg tablet, Take 25 mg by mouth every morning., Disp: , Rfl:     lancets Misc, To check BG one times daily, to use with insurance preferred meter, Disp: 50 each, Rfl: 0    levETIRAcetam (KEPPRA) 500 MG Tab, Take 1 tablet (500 mg total) by mouth 2 (two) times daily., Disp: 60 tablet, Rfl: 11    metFORMIN (GLUCOPHAGE-XR) 500 MG ER 24hr tablet, Take 2 tablets (1,000 mg total) by mouth nightly., Disp: 60 tablet, Rfl: 3    nitroGLYCERIN (NITROSTAT) 0.4 MG SL tablet, 1 TABLET UNDER TONGUE EVERY  5 MINUTES NO MORE THAN 3 TABS THEN GO TO ER FOR CHEST PAIN, Disp: 25 tablet, Rfl: 2    nystatin (MYCOSTATIN) ointment, Apply topically 2 (two) times daily., Disp: 30 g, Rfl: 2    TRULICITY 0.75 mg/0.5 mL pen injector, Inject 0.75 mg into the skin once a week., Disp: , Rfl:     ALPRAZolam (XANAX) 0.5 MG tablet, Take 1 tablet (0.5 mg total) by mouth 3 (three) times daily as needed for Anxiety., Disp: 90 tablet, Rfl: 1    DULoxetine (CYMBALTA) 60 MG capsule, Take 1 capsule (60 mg total) by mouth Daily., Disp: 30 capsule, Rfl: 1    sertraline  "(ZOLOFT) 50 MG tablet, Take 1 tablet (50 mg total) by mouth every morning., Disp: 30 tablet, Rfl: 1    terbinafine HCL (LAMISIL) 250 mg tablet, TAKE 1 TABLET BY MOUTH EVERY DAY for 12 weeks (Patient not taking: Reported on 6/21/2023), Disp: 84 tablet, Rfl: 0    zolpidem (AMBIEN) 10 mg Tab, Take 1 tablet (10 mg total) by mouth nightly as needed., Disp: 30 tablet, Rfl: 1       Significant Labs: - none at this time    Significant Imaging: - none at this time    Physical Exam  Vitals and nursing note reviewed.   Constitutional:       General: She is awake.      Appearance: Normal appearance.   Musculoskeletal:      Comments: Full ROM   Neurological:      Mental Status: She is alert.   Psychiatric:         Attention and Perception: Attention and perception normal. She does not perceive auditory or visual hallucinations.         Mood and Affect: Affect normal.         Speech: Speech normal.         Behavior: Behavior is cooperative.         Thought Content: Thought content does not include homicidal or suicidal ideation.         Cognition and Memory: Cognition and memory normal.        Review of Systems     Mental Status Exam:  Presentation:  - Appearance: 53 y.o. year old Black or  female, appears stated age, appears Casually dressed and Well groomed  - Motility: Erect when standing, Steady gait, No EPS or Tremors, No psychomotor agitation or retardation appreciated  - Behavior: calm, cooperative, good eye contact  Speech:  - Character/Organization: spontaneous, fluent, normal volume, normal rate, normal rhythm  Emotional State:  - Mood: "happy"   - Affect: congruent and appropriate  Thought:  - Process: logical, linear, organized , goal-directed  - Preoccupations: no ruminations, rituals, or phobias appreciated  - Delusions: no persecutory, paranoid, or grandiose delusions appreciated  - Perception: denies AVH, not actively responding to internal stimuli  - SI/HI: denies/denies  Sensorium & Intellect:  - " Sensorium: AAOx4  - Memory: intact to recent and remote events  - Attention/Concentration: good/good  - Insight/Judgement: good/good    Gait: normal swing and stance  MSK:no rigidity appreciated    All other systems without acute issues unless noted in HPI      Assessment/Plan      ICD-10-CM ICD-9-CM    1. Episode of recurrent major depressive disorder, unspecified depression episode severity  F33.9 296.30 DULoxetine (CYMBALTA) 60 MG capsule      2. Generalized anxiety disorder  F41.1 300.02 ALPRAZolam (XANAX) 0.5 MG tablet      DULoxetine (CYMBALTA) 60 MG capsule      3. Insomnia, unspecified type  G47.00 780.52 zolpidem (AMBIEN) 10 mg Tab         Continue current medications without change    Potential side effects and risks vs benefits of current treatment plan reviewed with patient. Applicable black box warnings reviewed. Encouraged patient not to alter dosages or abruptly discontinue medications without contacting prescriber first, due to risk of worsening symptoms and decompensation of mental status. Warned of risks associated with herbal remedies and supplements while taking psychotropic medications and of the need to consult prescriber prior to adding any of these to current regimen. Patient should abstain from abuse of alcohol, prescription medications, and illicit drugs. Reviewed when to contact clinic and/or seek emergent care, such as but not limited to, onset/worsening SI/HI, hallucinations, delusions, manic symptoms. Pt verbalized understanding and agreement of these warnings/recommendations and verbally consented to treatment plan.     checked. Results as expected. No suspected diversion or abuse of controlled substances.    [facility] Comprehensive Profile at next visit    Follow up in about 2 months (around 8/21/2023).    Brendan Case, JUSTICEP

## 2023-07-03 DIAGNOSIS — E11.9 TYPE 2 DIABETES MELLITUS WITHOUT COMPLICATION, WITHOUT LONG-TERM CURRENT USE OF INSULIN: ICD-10-CM

## 2023-07-03 DIAGNOSIS — I10 HYPERTENSION, UNSPECIFIED TYPE: ICD-10-CM

## 2023-07-03 DIAGNOSIS — M54.16 LUMBAR RADICULOPATHY: ICD-10-CM

## 2023-07-03 RX ORDER — CARVEDILOL 12.5 MG/1
TABLET ORAL
Qty: 60 TABLET | Refills: 1 | Status: SHIPPED | OUTPATIENT
Start: 2023-07-03 | End: 2023-08-30

## 2023-07-03 RX ORDER — IBUPROFEN 800 MG/1
TABLET ORAL
Qty: 30 TABLET | Refills: 1 | Status: SHIPPED | OUTPATIENT
Start: 2023-07-03 | End: 2023-10-24

## 2023-07-03 RX ORDER — METFORMIN HYDROCHLORIDE 500 MG/1
TABLET, EXTENDED RELEASE ORAL
Qty: 60 TABLET | Refills: 1 | Status: SHIPPED | OUTPATIENT
Start: 2023-07-03 | End: 2023-08-30

## 2023-07-24 ENCOUNTER — PATIENT MESSAGE (OUTPATIENT)
Dept: ADMINISTRATIVE | Facility: HOSPITAL | Age: 53
End: 2023-07-24
Payer: MEDICAID

## 2023-07-28 ENCOUNTER — TELEPHONE (OUTPATIENT)
Dept: FAMILY MEDICINE | Facility: CLINIC | Age: 53
End: 2023-07-28
Payer: MEDICAID

## 2023-07-28 RX ORDER — FLUCONAZOLE 200 MG/1
200 TABLET ORAL ONCE
Qty: 1 TABLET | Refills: 0 | Status: SHIPPED | OUTPATIENT
Start: 2023-07-28 | End: 2023-07-28

## 2023-07-28 NOTE — TELEPHONE ENCOUNTER
Sent rx for diflucan to her pharmacy.  If this is ineffective, she'll need to schedule an appointment with GYN. Encourage use of unscented baby wipes and barrier cream twice a day.

## 2023-07-28 NOTE — TELEPHONE ENCOUNTER
Pt states her privates are itching badly.  States she was taking Jardiance and Zulma had sent her Diflucan.  She is asking if it can be called in.  Please advise.

## 2023-08-16 ENCOUNTER — OFFICE VISIT (OUTPATIENT)
Dept: BEHAVIORAL HEALTH | Facility: CLINIC | Age: 53
End: 2023-08-16
Payer: MEDICAID

## 2023-08-16 VITALS
TEMPERATURE: 98 F | HEIGHT: 62 IN | SYSTOLIC BLOOD PRESSURE: 138 MMHG | WEIGHT: 183 LBS | HEART RATE: 80 BPM | DIASTOLIC BLOOD PRESSURE: 88 MMHG | BODY MASS INDEX: 33.68 KG/M2 | OXYGEN SATURATION: 99 %

## 2023-08-16 DIAGNOSIS — G47.00 INSOMNIA, UNSPECIFIED TYPE: ICD-10-CM

## 2023-08-16 DIAGNOSIS — F33.9 EPISODE OF RECURRENT MAJOR DEPRESSIVE DISORDER, UNSPECIFIED DEPRESSION EPISODE SEVERITY: Primary | ICD-10-CM

## 2023-08-16 DIAGNOSIS — F41.1 GENERALIZED ANXIETY DISORDER: ICD-10-CM

## 2023-08-16 PROCEDURE — 3075F SYST BP GE 130 - 139MM HG: CPT | Mod: CPTII,,, | Performed by: NURSE PRACTITIONER

## 2023-08-16 PROCEDURE — 99214 PR OFFICE/OUTPT VISIT, EST, LEVL IV, 30-39 MIN: ICD-10-PCS | Mod: ,,, | Performed by: NURSE PRACTITIONER

## 2023-08-16 PROCEDURE — 3008F PR BODY MASS INDEX (BMI) DOCUMENTED: ICD-10-PCS | Mod: CPTII,,, | Performed by: NURSE PRACTITIONER

## 2023-08-16 PROCEDURE — 3044F HG A1C LEVEL LT 7.0%: CPT | Mod: CPTII,,, | Performed by: NURSE PRACTITIONER

## 2023-08-16 PROCEDURE — 1160F RVW MEDS BY RX/DR IN RCRD: CPT | Mod: CPTII,,, | Performed by: NURSE PRACTITIONER

## 2023-08-16 PROCEDURE — 3044F PR MOST RECENT HEMOGLOBIN A1C LEVEL <7.0%: ICD-10-PCS | Mod: CPTII,,, | Performed by: NURSE PRACTITIONER

## 2023-08-16 PROCEDURE — 1159F MED LIST DOCD IN RCRD: CPT | Mod: CPTII,,, | Performed by: NURSE PRACTITIONER

## 2023-08-16 PROCEDURE — 1159F PR MEDICATION LIST DOCUMENTED IN MEDICAL RECORD: ICD-10-PCS | Mod: CPTII,,, | Performed by: NURSE PRACTITIONER

## 2023-08-16 PROCEDURE — 1160F PR REVIEW ALL MEDS BY PRESCRIBER/CLIN PHARMACIST DOCUMENTED: ICD-10-PCS | Mod: CPTII,,, | Performed by: NURSE PRACTITIONER

## 2023-08-16 PROCEDURE — 3075F PR MOST RECENT SYSTOLIC BLOOD PRESS GE 130-139MM HG: ICD-10-PCS | Mod: CPTII,,, | Performed by: NURSE PRACTITIONER

## 2023-08-16 PROCEDURE — 3079F DIAST BP 80-89 MM HG: CPT | Mod: CPTII,,, | Performed by: NURSE PRACTITIONER

## 2023-08-16 PROCEDURE — 3079F PR MOST RECENT DIASTOLIC BLOOD PRESSURE 80-89 MM HG: ICD-10-PCS | Mod: CPTII,,, | Performed by: NURSE PRACTITIONER

## 2023-08-16 PROCEDURE — 99214 OFFICE O/P EST MOD 30 MIN: CPT | Mod: ,,, | Performed by: NURSE PRACTITIONER

## 2023-08-16 PROCEDURE — 3008F BODY MASS INDEX DOCD: CPT | Mod: CPTII,,, | Performed by: NURSE PRACTITIONER

## 2023-08-16 RX ORDER — ALPRAZOLAM 0.5 MG/1
0.5 TABLET ORAL 3 TIMES DAILY PRN
Qty: 90 TABLET | Refills: 1 | Status: SHIPPED | OUTPATIENT
Start: 2023-08-16 | End: 2023-10-16 | Stop reason: SDUPTHER

## 2023-08-16 RX ORDER — ZOLPIDEM TARTRATE 10 MG/1
10 TABLET ORAL NIGHTLY PRN
Qty: 30 TABLET | Refills: 1 | Status: SHIPPED | OUTPATIENT
Start: 2023-08-16 | End: 2023-10-17 | Stop reason: SDUPTHER

## 2023-08-16 RX ORDER — DULOXETIN HYDROCHLORIDE 60 MG/1
60 CAPSULE, DELAYED RELEASE ORAL DAILY
Qty: 30 CAPSULE | Refills: 1 | Status: SHIPPED | OUTPATIENT
Start: 2023-08-16 | End: 2023-10-16 | Stop reason: SDUPTHER

## 2023-08-16 NOTE — PROGRESS NOTES
"PSYCHIATRIC FOLLOW-UP VISIT NOTE    Chief Complaint   Patient presents with    Medication Refill         History of Present Illness  53 y.o. year old Black or  female with hx of MDD, CATARINO, and insomnia seen today for follow-up appointment and medication management.  She is accompanied by her sister to today's visit.  Patient reports she is been doing very well since last visit.  Denies any recent depression.  Describes mood as happy and pleasant.  No anhedonia, isolative behaviors, feelings of guilt or shame, appetite changes, or thoughts of self-harm.  Anxiety also well-controlled with current medication regimen.  No excessive worry, difficulty relaxing, or recent panic attacks.  She is sleeping well at night and feels rested in the morning.  Averages about 9 hours of sleep per night.  Denies any side effects from current medication regimen. Patient denies SI/HI. Denies hallucinations and does not appear to be responding to internal stimuli or be internally preoccupied. No manic symptoms noted.       Objective:     Vitals:  Vitals:    08/16/23 1412   BP: 138/88   BP Location: Right arm   Pulse: 80   Temp: 98.1 °F (36.7 °C)   TempSrc: Temporal   SpO2: 99%   Weight: 83 kg (183 lb)   Height: 5' 2" (1.575 m)       Wt Readings from Last 3 Encounters:   08/16/23 1412 83 kg (183 lb)   06/21/23 1306 82.1 kg (181 lb)   05/12/23 1321 81.9 kg (180 lb 9.6 oz)         Medication:    Current Outpatient Medications:     ADULT LOW DOSE ASPIRIN 81 mg EC tablet, Take 81 mg by mouth nightly., Disp: , Rfl:     amLODIPine (NORVASC) 10 MG tablet, Take 10 mg by mouth Daily., Disp: , Rfl:     atorvastatin (LIPITOR) 20 MG tablet, Take 1 tablet (20 mg total) by mouth Daily., Disp: 30 tablet, Rfl: 3    blood sugar diagnostic Strp, To check BG 1 times daily, to use with insurance preferred meter, Disp: 30 each, Rfl: 0    blood-glucose meter kit, To check BG 1 times daily, to use with insurance preferred meter, Disp: 1 each, " Rfl: 0    carvediloL (COREG) 12.5 MG tablet, TAKE 1 TABLET BY MOUTH TWO TIMES A DAY, Disp: 60 tablet, Rfl: 1    cetirizine (ZYRTEC) 10 MG tablet, Take 10 mg by mouth daily as needed., Disp: , Rfl:     clobetasoL (TEMOVATE) 0.05 % cream, Apply topically 2 (two) times daily., Disp: 60 g, Rfl: 2    fluticasone propionate (FLONASE) 50 mcg/actuation nasal spray, 1 spray by Each Nostril route 2 (two) times daily., Disp: , Rfl:     gabapentin (NEURONTIN) 400 MG capsule, Take 3 capsules by mouth Daily., Disp: , Rfl:     ibuprofen (ADVIL,MOTRIN) 800 MG tablet, TAKE 1 TABLET BY MOUTH EVERY EIGHT HOURS, Disp: 30 tablet, Rfl: 1    JARDIANCE 25 mg tablet, Take 25 mg by mouth every morning., Disp: , Rfl:     lancets Misc, To check BG one times daily, to use with insurance preferred meter, Disp: 50 each, Rfl: 0    levETIRAcetam (KEPPRA) 500 MG Tab, Take 1 tablet (500 mg total) by mouth 2 (two) times daily., Disp: 60 tablet, Rfl: 11    metFORMIN (GLUCOPHAGE-XR) 500 MG ER 24hr tablet, TKI 2 TABLETS BY MOUTH NIGHTLY, Disp: 60 tablet, Rfl: 1    nitroGLYCERIN (NITROSTAT) 0.4 MG SL tablet, 1 TABLET UNDER TONGUE EVERY  5 MINUTES NO MORE THAN 3 TABS THEN GO TO ER FOR CHEST PAIN, Disp: 25 tablet, Rfl: 2    TRULICITY 0.75 mg/0.5 mL pen injector, Inject 0.75 mg into the skin once a week., Disp: , Rfl:     ALPRAZolam (XANAX) 0.5 MG tablet, Take 1 tablet (0.5 mg total) by mouth 3 (three) times daily as needed for Anxiety., Disp: 90 tablet, Rfl: 1    DULoxetine (CYMBALTA) 60 MG capsule, Take 1 capsule (60 mg total) by mouth Daily., Disp: 30 capsule, Rfl: 1    zolpidem (AMBIEN) 10 mg Tab, Take 1 tablet (10 mg total) by mouth nightly as needed (insomnia)., Disp: 30 tablet, Rfl: 1       Significant Labs: - none at this time    Significant Imaging: - none at this time    Physical Exam  Vitals and nursing note reviewed.   Constitutional:       General: She is awake.      Appearance: Normal appearance.   Musculoskeletal:      Comments: Full ROM  "  Neurological:      Mental Status: She is alert.   Psychiatric:         Attention and Perception: Attention and perception normal. She does not perceive auditory or visual hallucinations.         Mood and Affect: Affect normal.         Speech: Speech normal.         Behavior: Behavior is cooperative.         Thought Content: Thought content does not include homicidal or suicidal ideation.         Cognition and Memory: Cognition and memory normal.          Review of Systems     Mental Status Exam:  Presentation:  - Appearance: 53 y.o. year old Black or  female, appears stated age, appears Casually dressed and Well groomed  - Motility: Erect when standing, Steady gait, No EPS or Tremors, No psychomotor agitation or retardation appreciated  - Behavior: calm, cooperative, good eye contact  Speech:  - Character/Organization: spontaneous, fluent, normal volume, normal rate, normal rhythm  Emotional State:  - Mood: "happy"   - Affect: congruent and appropriate  Thought:  - Process: logical, linear, organized , goal-directed  - Preoccupations: no ruminations, rituals, or phobias appreciated  - Delusions: no persecutory, paranoid, or grandiose delusions appreciated  - Perception: denies AVH, not actively responding to internal stimuli  - SI/HI: denies/denies  Sensorium & Intellect:  - Sensorium: AAOx4  - Memory: intact to recent and remote events  - Attention/Concentration: good/good  - Insight/Judgement: good/good    Gait: normal swing and stance  MSK:no rigidity appreciated    All other systems without acute issues unless noted in HPI      Assessment/Plan      ICD-10-CM ICD-9-CM    1. Episode of recurrent major depressive disorder, unspecified depression episode severity  F33.9 296.30 DULoxetine (CYMBALTA) 60 MG capsule      2. Generalized anxiety disorder  F41.1 300.02 DULoxetine (CYMBALTA) 60 MG capsule      ALPRAZolam (XANAX) 0.5 MG tablet      3. Insomnia, unspecified type  G47.00 780.52 zolpidem " (AMBIEN) 10 mg Tab         Continue current medications without change    Potential side effects and risks vs benefits of current treatment plan reviewed with patient. Applicable black box warnings reviewed. Encouraged patient not to alter dosages or abruptly discontinue medications without contacting prescriber first, due to risk of worsening symptoms and decompensation of mental status. Warned of risks associated with herbal remedies and supplements while taking psychotropic medications and of the need to consult prescriber prior to adding any of these to current regimen. Patient should abstain from abuse of alcohol, prescription medications, and illicit drugs. Reviewed when to contact clinic and/or seek emergent care, such as but not limited to, onset/worsening SI/HI, hallucinations, delusions, manic symptoms. Pt verbalized understanding and agreement of these warnings/recommendations and verbally consented to treatment plan.     checked. Results as expected. No suspected diversion or abuse of controlled substances.      Follow up in about 2 months (around 10/16/2023) for Medication Management.    Brendan Case, JUSTICEP

## 2023-08-28 ENCOUNTER — TELEPHONE (OUTPATIENT)
Dept: FAMILY MEDICINE | Facility: CLINIC | Age: 53
End: 2023-08-28
Payer: MEDICAID

## 2023-08-28 DIAGNOSIS — B37.31 VAGINAL CANDIDA: Primary | ICD-10-CM

## 2023-08-28 RX ORDER — FLUCONAZOLE 150 MG/1
150 TABLET ORAL DAILY
Qty: 1 TABLET | Refills: 1 | Status: SHIPPED | OUTPATIENT
Start: 2023-08-28 | End: 2023-12-18

## 2023-08-28 NOTE — TELEPHONE ENCOUNTER
"I think she is been on this dose for some time now.  Jardiance does make you have "sweet urine".  So the more sugar you eat in your diet though more sugar you will urinate out.  Sometimes you can get a little overgrowth of yeast due to the increased sugar in the urine.  This is a very good medicine and it is a good idea for her to stay on it if she can tolerate it.  Advise that she get some water wipes, these are in the baby wipes section but they are water only and no perfume and use those to cleanse after she uses the bathroom.  I will send her out some medicine for overgrowth of yeast at this time. If this does not work she will need to come in so we can adjust medications.   "

## 2023-08-30 ENCOUNTER — TELEPHONE (OUTPATIENT)
Dept: FAMILY MEDICINE | Facility: CLINIC | Age: 53
End: 2023-08-30
Payer: MEDICAID

## 2023-08-30 DIAGNOSIS — E11.9 TYPE 2 DIABETES MELLITUS WITHOUT COMPLICATION, WITHOUT LONG-TERM CURRENT USE OF INSULIN: Primary | ICD-10-CM

## 2023-08-30 DIAGNOSIS — I10 HYPERTENSION, UNSPECIFIED TYPE: ICD-10-CM

## 2023-08-30 DIAGNOSIS — E11.9 TYPE 2 DIABETES MELLITUS WITHOUT COMPLICATION, WITHOUT LONG-TERM CURRENT USE OF INSULIN: ICD-10-CM

## 2023-08-30 RX ORDER — GABAPENTIN 400 MG/1
CAPSULE ORAL
Qty: 120 CAPSULE | Refills: 1 | Status: SHIPPED | OUTPATIENT
Start: 2023-08-30 | End: 2023-10-24

## 2023-08-30 RX ORDER — AMLODIPINE BESYLATE 10 MG/1
10 TABLET ORAL
Qty: 30 TABLET | Refills: 1 | Status: SHIPPED | OUTPATIENT
Start: 2023-08-30 | End: 2023-10-24

## 2023-08-30 RX ORDER — CARVEDILOL 12.5 MG/1
TABLET ORAL
Qty: 60 TABLET | Refills: 1 | Status: SHIPPED | OUTPATIENT
Start: 2023-08-30 | End: 2023-10-24

## 2023-08-30 RX ORDER — EMPAGLIFLOZIN 25 MG/1
25 TABLET, FILM COATED ORAL EVERY MORNING
Qty: 30 TABLET | Refills: 1 | Status: SHIPPED | OUTPATIENT
Start: 2023-08-30 | End: 2023-10-24

## 2023-08-30 RX ORDER — DULAGLUTIDE 0.75 MG/.5ML
0.75 INJECTION, SOLUTION SUBCUTANEOUS WEEKLY
Qty: 2 PEN | Refills: 3 | Status: SHIPPED | OUTPATIENT
Start: 2023-08-30 | End: 2023-11-21 | Stop reason: ALTCHOICE

## 2023-08-30 RX ORDER — METFORMIN HYDROCHLORIDE 500 MG/1
1000 TABLET, EXTENDED RELEASE ORAL NIGHTLY
Qty: 60 TABLET | Refills: 1 | Status: SHIPPED | OUTPATIENT
Start: 2023-08-30 | End: 2023-10-24

## 2023-09-05 RX ORDER — SERTRALINE HYDROCHLORIDE 50 MG/1
50 TABLET, FILM COATED ORAL EVERY MORNING
Qty: 30 TABLET | Refills: 1 | OUTPATIENT
Start: 2023-09-05

## 2023-09-11 DIAGNOSIS — M54.16 LUMBAR RADICULOPATHY: ICD-10-CM

## 2023-09-11 RX ORDER — NITROGLYCERIN 0.4 MG/1
TABLET SUBLINGUAL
Qty: 25 TABLET | Refills: 2 | Status: SHIPPED | OUTPATIENT
Start: 2023-09-11

## 2023-09-27 RX ORDER — SERTRALINE HYDROCHLORIDE 50 MG/1
50 TABLET, FILM COATED ORAL EVERY MORNING
Qty: 30 TABLET | Refills: 1 | OUTPATIENT
Start: 2023-09-27

## 2023-10-16 DIAGNOSIS — F41.1 GENERALIZED ANXIETY DISORDER: ICD-10-CM

## 2023-10-16 DIAGNOSIS — F33.9 EPISODE OF RECURRENT MAJOR DEPRESSIVE DISORDER, UNSPECIFIED DEPRESSION EPISODE SEVERITY: ICD-10-CM

## 2023-10-16 RX ORDER — DULOXETIN HYDROCHLORIDE 60 MG/1
60 CAPSULE, DELAYED RELEASE ORAL DAILY
Qty: 30 CAPSULE | Refills: 0 | Status: SHIPPED | OUTPATIENT
Start: 2023-10-16 | End: 2023-11-21 | Stop reason: SDUPTHER

## 2023-10-16 RX ORDER — ALPRAZOLAM 0.5 MG/1
0.5 TABLET ORAL 3 TIMES DAILY PRN
Qty: 90 TABLET | Refills: 0 | Status: SHIPPED | OUTPATIENT
Start: 2023-10-16 | End: 2023-11-20

## 2023-10-17 ENCOUNTER — OFFICE VISIT (OUTPATIENT)
Dept: BEHAVIORAL HEALTH | Facility: CLINIC | Age: 53
End: 2023-10-17
Payer: MEDICAID

## 2023-10-17 VITALS
TEMPERATURE: 97 F | BODY MASS INDEX: 32.94 KG/M2 | OXYGEN SATURATION: 94 % | DIASTOLIC BLOOD PRESSURE: 78 MMHG | HEART RATE: 84 BPM | SYSTOLIC BLOOD PRESSURE: 118 MMHG | WEIGHT: 179 LBS | HEIGHT: 62 IN

## 2023-10-17 DIAGNOSIS — F33.9 EPISODE OF RECURRENT MAJOR DEPRESSIVE DISORDER, UNSPECIFIED DEPRESSION EPISODE SEVERITY: Primary | ICD-10-CM

## 2023-10-17 DIAGNOSIS — G47.00 INSOMNIA, UNSPECIFIED TYPE: ICD-10-CM

## 2023-10-17 DIAGNOSIS — F41.1 GENERALIZED ANXIETY DISORDER: ICD-10-CM

## 2023-10-17 PROCEDURE — 3008F PR BODY MASS INDEX (BMI) DOCUMENTED: ICD-10-PCS | Mod: CPTII,,, | Performed by: NURSE PRACTITIONER

## 2023-10-17 PROCEDURE — 3044F HG A1C LEVEL LT 7.0%: CPT | Mod: CPTII,,, | Performed by: NURSE PRACTITIONER

## 2023-10-17 PROCEDURE — 3074F SYST BP LT 130 MM HG: CPT | Mod: CPTII,,, | Performed by: NURSE PRACTITIONER

## 2023-10-17 PROCEDURE — 3078F DIAST BP <80 MM HG: CPT | Mod: CPTII,,, | Performed by: NURSE PRACTITIONER

## 2023-10-17 PROCEDURE — 1159F MED LIST DOCD IN RCRD: CPT | Mod: CPTII,,, | Performed by: NURSE PRACTITIONER

## 2023-10-17 PROCEDURE — 3008F BODY MASS INDEX DOCD: CPT | Mod: CPTII,,, | Performed by: NURSE PRACTITIONER

## 2023-10-17 PROCEDURE — 1159F PR MEDICATION LIST DOCUMENTED IN MEDICAL RECORD: ICD-10-PCS | Mod: CPTII,,, | Performed by: NURSE PRACTITIONER

## 2023-10-17 PROCEDURE — 99214 OFFICE O/P EST MOD 30 MIN: CPT | Mod: ,,, | Performed by: NURSE PRACTITIONER

## 2023-10-17 PROCEDURE — 3044F PR MOST RECENT HEMOGLOBIN A1C LEVEL <7.0%: ICD-10-PCS | Mod: CPTII,,, | Performed by: NURSE PRACTITIONER

## 2023-10-17 PROCEDURE — 1160F PR REVIEW ALL MEDS BY PRESCRIBER/CLIN PHARMACIST DOCUMENTED: ICD-10-PCS | Mod: CPTII,,, | Performed by: NURSE PRACTITIONER

## 2023-10-17 PROCEDURE — 1160F RVW MEDS BY RX/DR IN RCRD: CPT | Mod: CPTII,,, | Performed by: NURSE PRACTITIONER

## 2023-10-17 PROCEDURE — 3078F PR MOST RECENT DIASTOLIC BLOOD PRESSURE < 80 MM HG: ICD-10-PCS | Mod: CPTII,,, | Performed by: NURSE PRACTITIONER

## 2023-10-17 PROCEDURE — 3074F PR MOST RECENT SYSTOLIC BLOOD PRESSURE < 130 MM HG: ICD-10-PCS | Mod: CPTII,,, | Performed by: NURSE PRACTITIONER

## 2023-10-17 PROCEDURE — 99214 PR OFFICE/OUTPT VISIT, EST, LEVL IV, 30-39 MIN: ICD-10-PCS | Mod: ,,, | Performed by: NURSE PRACTITIONER

## 2023-10-17 RX ORDER — ZOLPIDEM TARTRATE 10 MG/1
10 TABLET ORAL NIGHTLY PRN
Qty: 30 TABLET | Refills: 1 | Status: SHIPPED | OUTPATIENT
Start: 2023-10-17 | End: 2023-12-18 | Stop reason: SDUPTHER

## 2023-10-17 NOTE — PROGRESS NOTES
"PSYCHIATRIC FOLLOW-UP VISIT NOTE    Chief Complaint   Patient presents with    Depression     2 month F/U         History of Present Illness  53 y.o. year old Black or  female with hx of MDD, CATARINO, and insomnia seen today for follow-up appointment and medication management.  Patient reports she is been doing quite well since last visit.  Denies any recent depression.  No anhedonia, isolative behaviors, feelings of guilt or shame, appetite changes, or thoughts of self-harm.  Anxiety also well-controlled with current medication regimen.  No excessive worry, difficulty relaxing, being easily, or restlessness.  She is sleeping well at night and feels rested in the morning.  Denies any side effects from current medication regimen. Patient denies SI/HI. Denies hallucinations and does not appear to be responding to internal stimuli or be internally preoccupied. No manic symptoms noted.       Objective:     Vitals:  Vitals:    10/17/23 1300   BP: 118/78   BP Location: Right arm   Patient Position: Sitting   BP Method: Large (Manual)   Pulse: 84   Temp: 97.3 °F (36.3 °C)   TempSrc: Temporal   SpO2: (!) 94%   Weight: 81.2 kg (179 lb 0.2 oz)   Height: 5' 2.01" (1.575 m)       Wt Readings from Last 3 Encounters:   10/17/23 1300 81.2 kg (179 lb 0.2 oz)   08/16/23 1412 83 kg (183 lb)   06/21/23 1306 82.1 kg (181 lb)         Medication:    Current Outpatient Medications:     ADULT LOW DOSE ASPIRIN 81 mg EC tablet, Take 81 mg by mouth nightly., Disp: , Rfl:     ALPRAZolam (XANAX) 0.5 MG tablet, Take 1 tablet (0.5 mg total) by mouth 3 (three) times daily as needed for Anxiety., Disp: 90 tablet, Rfl: 0    amLODIPine (NORVASC) 10 MG tablet, TAKE 1 TABLET BY MOUTH DAILY, Disp: 30 tablet, Rfl: 1    atorvastatin (LIPITOR) 20 MG tablet, TAKE 1 TABLET BY MOUTH DAILY, Disp: 30 tablet, Rfl: 1    blood sugar diagnostic Strp, To check BG 1 times daily, to use with insurance preferred meter, Disp: 30 each, Rfl: 0    blood-glucose " meter kit, To check BG 1 times daily, to use with insurance preferred meter, Disp: 1 each, Rfl: 0    carvediloL (COREG) 12.5 MG tablet, TAKE 1 TABLET BY MOUTH TWO TIMES A DAY, Disp: 60 tablet, Rfl: 1    cetirizine (ZYRTEC) 10 MG tablet, Take 10 mg by mouth daily as needed., Disp: , Rfl:     clobetasoL (TEMOVATE) 0.05 % cream, Apply topically 2 (two) times daily., Disp: 60 g, Rfl: 2    DULoxetine (CYMBALTA) 60 MG capsule, Take 1 capsule (60 mg total) by mouth Daily., Disp: 30 capsule, Rfl: 0    fluticasone propionate (FLONASE) 50 mcg/actuation nasal spray, 1 spray by Each Nostril route 2 (two) times daily., Disp: , Rfl:     gabapentin (NEURONTIN) 400 MG capsule, TAKE 1 CAPSULE BY MOUTH EVERY MORNING 1 AT NOON 2 AT BEDTIME, Disp: 120 capsule, Rfl: 1    ibuprofen (ADVIL,MOTRIN) 800 MG tablet, TAKE 1 TABLET BY MOUTH EVERY EIGHT HOURS, Disp: 30 tablet, Rfl: 1    JARDIANCE 25 mg tablet, TAKE 1 TABLET BY MOUTH EVERY MORNING, Disp: 30 tablet, Rfl: 1    lancets Misc, To check BG one times daily, to use with insurance preferred meter, Disp: 50 each, Rfl: 0    levETIRAcetam (KEPPRA) 500 MG Tab, Take 1 tablet (500 mg total) by mouth 2 (two) times daily., Disp: 60 tablet, Rfl: 11    metFORMIN (GLUCOPHAGE-XR) 500 MG ER 24hr tablet, TAKE 2 TABLETS BY MOUTH NIGHTLY, Disp: 60 tablet, Rfl: 1    nitroGLYCERIN (NITROSTAT) 0.4 MG SL tablet, 1 TABLET UNDER TONGUE EVERY  5 MINUTES NO MORE THAN 3 TABS THEN GO TO ER FOR CHEST PAIN, Disp: 25 tablet, Rfl: 2    TRULICITY 0.75 mg/0.5 mL pen injector, Inject 0.75 mg into the skin once a week., Disp: 2 pen , Rfl: 3    fluconazole (DIFLUCAN) 150 MG Tab, Take 1 tablet (150 mg total) by mouth once daily. (Patient not taking: Reported on 10/17/2023), Disp: 1 tablet, Rfl: 1    zolpidem (AMBIEN) 10 mg Tab, Take 1 tablet (10 mg total) by mouth nightly as needed (insomnia)., Disp: 30 tablet, Rfl: 1       Significant Labs: - none at this time    Significant Imaging: - none at this time    Physical  "Exam  Vitals and nursing note reviewed.   Constitutional:       General: She is awake.      Appearance: Normal appearance.   Musculoskeletal:      Comments: Full ROM   Neurological:      Mental Status: She is alert.   Psychiatric:         Attention and Perception: Attention and perception normal. She does not perceive auditory or visual hallucinations.         Mood and Affect: Affect normal.         Speech: Speech normal.         Behavior: Behavior is cooperative.         Thought Content: Thought content does not include homicidal or suicidal ideation.         Cognition and Memory: Cognition and memory normal.          Review of Systems     Mental Status Exam:  Presentation:  - Appearance: 53 y.o. year old Black or  female, appears stated age, appears Casually dressed and Well groomed  - Motility: Erect when standing, Steady gait, No EPS or Tremors, No psychomotor agitation or retardation appreciated  - Behavior: calm, cooperative, good eye contact  Speech:  - Character/Organization: spontaneous, fluent, normal volume, normal rate, normal rhythm  Emotional State:  - Mood: "Happy"   - Affect: congruent and appropriate  Thought:  - Process: logical, linear, organized , goal-directed  - Preoccupations: no ruminations, rituals, or phobias appreciated  - Delusions: no persecutory, paranoid, or grandiose delusions appreciated  - Perception: denies AVH, not actively responding to internal stimuli  - SI/HI: denies/denies  Sensorium & Intellect:  - Sensorium: AAOx4  - Memory: intact to recent and remote events  - Attention/Concentration: good/good  - Insight/Judgement: good/good    Gait: normal swing and stance  MSK:no rigidity appreciated    All other systems without acute issues unless noted in HPI      Assessment/Plan      ICD-10-CM ICD-9-CM    1. Episode of recurrent major depressive disorder, unspecified depression episode severity  F33.9 296.30       2. Generalized anxiety disorder  F41.1 300.02     "   3. Insomnia, unspecified type  G47.00 780.52 zolpidem (AMBIEN) 10 mg Tab         Continue current medications without change    Potential side effects and risks vs benefits of current treatment plan reviewed with patient. Applicable black box warnings reviewed. Encouraged patient not to alter dosages or abruptly discontinue medications without contacting prescriber first, due to risk of worsening symptoms and decompensation of mental status. Warned of risks associated with herbal remedies and supplements while taking psychotropic medications and of the need to consult prescriber prior to adding any of these to current regimen. Patient should abstain from abuse of alcohol, prescription medications, and illicit drugs. Reviewed when to contact clinic and/or seek emergent care, such as but not limited to, onset/worsening SI/HI, hallucinations, delusions, manic symptoms. Pt verbalized understanding and agreement of these warnings/recommendations and verbally consented to treatment plan.    Reviewed non-pharmacologic coping skills to decrease anxiety, such as deep breathing, journaling, exercise, recognizing triggers, meditation, healthy diet and exercise, routine sleep schedule, negative thought recognition, time for hobbies/interests, relaxation techniques, avoidance of substance abuse, limiting caffeine, benefits of counseling, and biofeedback. Patient verbalized understanding.     checked. Results as expected. No suspected diversion or abuse of controlled substances.        Follow up in about 2 months (around 12/17/2023) for Medication Management.    LORE Chun

## 2023-10-24 DIAGNOSIS — E11.9 TYPE 2 DIABETES MELLITUS WITHOUT COMPLICATION, WITHOUT LONG-TERM CURRENT USE OF INSULIN: ICD-10-CM

## 2023-10-24 DIAGNOSIS — I10 HYPERTENSION, UNSPECIFIED TYPE: ICD-10-CM

## 2023-10-24 DIAGNOSIS — M54.16 LUMBAR RADICULOPATHY: ICD-10-CM

## 2023-10-24 RX ORDER — METFORMIN HYDROCHLORIDE 500 MG/1
1000 TABLET, EXTENDED RELEASE ORAL NIGHTLY
Qty: 60 TABLET | Refills: 0 | Status: SHIPPED | OUTPATIENT
Start: 2023-10-24 | End: 2023-11-20

## 2023-10-24 RX ORDER — IBUPROFEN 800 MG/1
TABLET ORAL
Qty: 30 TABLET | Refills: 5 | Status: SHIPPED | OUTPATIENT
Start: 2023-10-24

## 2023-10-24 RX ORDER — AMLODIPINE BESYLATE 10 MG/1
10 TABLET ORAL
Qty: 30 TABLET | Refills: 0 | Status: SHIPPED | OUTPATIENT
Start: 2023-10-24 | End: 2023-11-20

## 2023-10-24 RX ORDER — CARVEDILOL 12.5 MG/1
TABLET ORAL
Qty: 60 TABLET | Refills: 0 | Status: SHIPPED | OUTPATIENT
Start: 2023-10-24 | End: 2023-11-20

## 2023-10-24 RX ORDER — GABAPENTIN 400 MG/1
CAPSULE ORAL
Qty: 120 CAPSULE | Refills: 0 | Status: SHIPPED | OUTPATIENT
Start: 2023-10-24 | End: 2023-11-20

## 2023-10-24 RX ORDER — EMPAGLIFLOZIN 25 MG/1
25 TABLET, FILM COATED ORAL EVERY MORNING
Qty: 30 TABLET | Refills: 0 | Status: SHIPPED | OUTPATIENT
Start: 2023-10-24 | End: 2023-11-20

## 2023-10-26 ENCOUNTER — TELEPHONE (OUTPATIENT)
Dept: FAMILY MEDICINE | Facility: CLINIC | Age: 53
End: 2023-10-26
Payer: MEDICAID

## 2023-10-26 NOTE — TELEPHONE ENCOUNTER
She is scheduled for tomorrow but no showed her lab appointment on 10/18 and said that she would go to the hospital to do them. I do not see that she went. Please call and reschedule her appt with labs prior.

## 2023-10-27 ENCOUNTER — TELEPHONE (OUTPATIENT)
Dept: FAMILY MEDICINE | Facility: CLINIC | Age: 53
End: 2023-10-27

## 2023-11-14 PROCEDURE — 80061 LIPID PANEL: CPT | Performed by: NURSE PRACTITIONER

## 2023-11-14 PROCEDURE — 80053 COMPREHEN METABOLIC PANEL: CPT | Performed by: NURSE PRACTITIONER

## 2023-11-14 PROCEDURE — 83036 HEMOGLOBIN GLYCOSYLATED A1C: CPT | Performed by: NURSE PRACTITIONER

## 2023-11-14 PROCEDURE — 84443 ASSAY THYROID STIM HORMONE: CPT | Performed by: NURSE PRACTITIONER

## 2023-11-14 PROCEDURE — 85025 COMPLETE CBC W/AUTO DIFF WBC: CPT | Performed by: NURSE PRACTITIONER

## 2023-11-20 DIAGNOSIS — E78.5 HYPERLIPIDEMIA, UNSPECIFIED HYPERLIPIDEMIA TYPE: ICD-10-CM

## 2023-11-20 DIAGNOSIS — I10 HYPERTENSION, UNSPECIFIED TYPE: ICD-10-CM

## 2023-11-20 DIAGNOSIS — F41.1 GENERALIZED ANXIETY DISORDER: ICD-10-CM

## 2023-11-20 DIAGNOSIS — E11.9 TYPE 2 DIABETES MELLITUS WITHOUT COMPLICATION, WITHOUT LONG-TERM CURRENT USE OF INSULIN: ICD-10-CM

## 2023-11-20 RX ORDER — GABAPENTIN 400 MG/1
CAPSULE ORAL
Qty: 120 CAPSULE | Refills: 0 | Status: SHIPPED | OUTPATIENT
Start: 2023-11-20 | End: 2023-11-21 | Stop reason: SDUPTHER

## 2023-11-20 RX ORDER — ALPRAZOLAM 0.5 MG/1
TABLET ORAL
Qty: 90 TABLET | Refills: 0 | Status: SHIPPED | OUTPATIENT
Start: 2023-11-20 | End: 2023-12-18 | Stop reason: SDUPTHER

## 2023-11-20 RX ORDER — EMPAGLIFLOZIN 25 MG/1
25 TABLET, FILM COATED ORAL EVERY MORNING
Qty: 30 TABLET | Refills: 0 | Status: SHIPPED | OUTPATIENT
Start: 2023-11-20 | End: 2023-12-18

## 2023-11-20 RX ORDER — CARVEDILOL 12.5 MG/1
TABLET ORAL
Qty: 60 TABLET | Refills: 0 | Status: SHIPPED | OUTPATIENT
Start: 2023-11-20 | End: 2023-12-22

## 2023-11-20 RX ORDER — AMLODIPINE BESYLATE 10 MG/1
10 TABLET ORAL
Qty: 30 TABLET | Refills: 0 | Status: SHIPPED | OUTPATIENT
Start: 2023-11-20 | End: 2023-11-21 | Stop reason: SDUPTHER

## 2023-11-20 RX ORDER — METFORMIN HYDROCHLORIDE 500 MG/1
1000 TABLET, EXTENDED RELEASE ORAL NIGHTLY
Qty: 60 TABLET | Refills: 0 | Status: SHIPPED | OUTPATIENT
Start: 2023-11-20 | End: 2023-12-22

## 2023-11-20 RX ORDER — ATORVASTATIN CALCIUM 20 MG/1
20 TABLET, FILM COATED ORAL
Qty: 30 TABLET | Refills: 0 | Status: SHIPPED | OUTPATIENT
Start: 2023-11-20 | End: 2023-11-21 | Stop reason: SDUPTHER

## 2023-11-21 ENCOUNTER — OFFICE VISIT (OUTPATIENT)
Dept: FAMILY MEDICINE | Facility: CLINIC | Age: 53
End: 2023-11-21
Payer: MEDICAID

## 2023-11-21 VITALS
HEIGHT: 62 IN | HEART RATE: 83 BPM | OXYGEN SATURATION: 99 % | TEMPERATURE: 98 F | WEIGHT: 182 LBS | SYSTOLIC BLOOD PRESSURE: 116 MMHG | BODY MASS INDEX: 33.49 KG/M2 | DIASTOLIC BLOOD PRESSURE: 80 MMHG

## 2023-11-21 DIAGNOSIS — I10 HYPERTENSION, UNSPECIFIED TYPE: ICD-10-CM

## 2023-11-21 DIAGNOSIS — E78.5 HYPERLIPIDEMIA, UNSPECIFIED HYPERLIPIDEMIA TYPE: ICD-10-CM

## 2023-11-21 DIAGNOSIS — E11.9 TYPE 2 DIABETES MELLITUS WITHOUT COMPLICATION, WITHOUT LONG-TERM CURRENT USE OF INSULIN: Primary | ICD-10-CM

## 2023-11-21 DIAGNOSIS — Z79.899 MEDICATION MANAGEMENT: ICD-10-CM

## 2023-11-21 PROBLEM — J30.2 SEASONAL ALLERGIES: Status: ACTIVE | Noted: 2023-11-21

## 2023-11-21 PROCEDURE — 1159F PR MEDICATION LIST DOCUMENTED IN MEDICAL RECORD: ICD-10-PCS | Mod: CPTII,,, | Performed by: NURSE PRACTITIONER

## 2023-11-21 PROCEDURE — 3008F BODY MASS INDEX DOCD: CPT | Mod: CPTII,,, | Performed by: NURSE PRACTITIONER

## 2023-11-21 PROCEDURE — 99214 OFFICE O/P EST MOD 30 MIN: CPT | Mod: ,,, | Performed by: NURSE PRACTITIONER

## 2023-11-21 PROCEDURE — 1159F MED LIST DOCD IN RCRD: CPT | Mod: CPTII,,, | Performed by: NURSE PRACTITIONER

## 2023-11-21 PROCEDURE — 1160F RVW MEDS BY RX/DR IN RCRD: CPT | Mod: CPTII,,, | Performed by: NURSE PRACTITIONER

## 2023-11-21 PROCEDURE — 3008F PR BODY MASS INDEX (BMI) DOCUMENTED: ICD-10-PCS | Mod: CPTII,,, | Performed by: NURSE PRACTITIONER

## 2023-11-21 PROCEDURE — 3079F PR MOST RECENT DIASTOLIC BLOOD PRESSURE 80-89 MM HG: ICD-10-PCS | Mod: CPTII,,, | Performed by: NURSE PRACTITIONER

## 2023-11-21 PROCEDURE — 3046F HEMOGLOBIN A1C LEVEL >9.0%: CPT | Mod: CPTII,,, | Performed by: NURSE PRACTITIONER

## 2023-11-21 PROCEDURE — 3079F DIAST BP 80-89 MM HG: CPT | Mod: CPTII,,, | Performed by: NURSE PRACTITIONER

## 2023-11-21 PROCEDURE — 99214 PR OFFICE/OUTPT VISIT, EST, LEVL IV, 30-39 MIN: ICD-10-PCS | Mod: ,,, | Performed by: NURSE PRACTITIONER

## 2023-11-21 PROCEDURE — 3074F PR MOST RECENT SYSTOLIC BLOOD PRESSURE < 130 MM HG: ICD-10-PCS | Mod: CPTII,,, | Performed by: NURSE PRACTITIONER

## 2023-11-21 PROCEDURE — 3074F SYST BP LT 130 MM HG: CPT | Mod: CPTII,,, | Performed by: NURSE PRACTITIONER

## 2023-11-21 PROCEDURE — 1160F PR REVIEW ALL MEDS BY PRESCRIBER/CLIN PHARMACIST DOCUMENTED: ICD-10-PCS | Mod: CPTII,,, | Performed by: NURSE PRACTITIONER

## 2023-11-21 PROCEDURE — 3046F PR MOST RECENT HEMOGLOBIN A1C LEVEL > 9.0%: ICD-10-PCS | Mod: CPTII,,, | Performed by: NURSE PRACTITIONER

## 2023-11-21 RX ORDER — FLUTICASONE PROPIONATE 50 MCG
1 SPRAY, SUSPENSION (ML) NASAL 2 TIMES DAILY
Qty: 11.1 ML | Refills: 5 | Status: SHIPPED | OUTPATIENT
Start: 2023-11-21

## 2023-11-21 RX ORDER — LEVETIRACETAM 500 MG/1
500 TABLET ORAL 2 TIMES DAILY
Qty: 180 TABLET | Refills: 3 | Status: SHIPPED | OUTPATIENT
Start: 2023-11-21

## 2023-11-21 RX ORDER — LANCETS
EACH MISCELLANEOUS
Qty: 50 EACH | Refills: 0 | Status: SHIPPED | OUTPATIENT
Start: 2023-11-21 | End: 2023-12-18

## 2023-11-21 RX ORDER — INSULIN PUMP SYRINGE, 3 ML
EACH MISCELLANEOUS
Qty: 1 EACH | Refills: 0 | Status: SHIPPED | OUTPATIENT
Start: 2023-11-21 | End: 2023-12-18

## 2023-11-21 RX ORDER — GABAPENTIN 400 MG/1
CAPSULE ORAL
Qty: 270 CAPSULE | Refills: 3 | Status: SHIPPED | OUTPATIENT
Start: 2023-11-21

## 2023-11-21 RX ORDER — ATORVASTATIN CALCIUM 20 MG/1
20 TABLET, FILM COATED ORAL DAILY
Qty: 90 TABLET | Refills: 3 | Status: SHIPPED | OUTPATIENT
Start: 2023-11-21

## 2023-11-21 RX ORDER — SEMAGLUTIDE 0.68 MG/ML
0.5 INJECTION, SOLUTION SUBCUTANEOUS
Qty: 3 ML | Refills: 0 | Status: SHIPPED | OUTPATIENT
Start: 2023-11-21 | End: 2024-01-05 | Stop reason: ALTCHOICE

## 2023-11-21 RX ORDER — DULOXETIN HYDROCHLORIDE 60 MG/1
60 CAPSULE, DELAYED RELEASE ORAL DAILY
Qty: 90 CAPSULE | Refills: 3 | Status: SHIPPED | OUTPATIENT
Start: 2023-11-21 | End: 2023-12-18 | Stop reason: SDUPTHER

## 2023-11-21 RX ORDER — AMLODIPINE BESYLATE 10 MG/1
10 TABLET ORAL DAILY
Qty: 90 TABLET | Refills: 3 | Status: SHIPPED | OUTPATIENT
Start: 2023-11-21

## 2023-11-21 NOTE — ASSESSMENT & PLAN NOTE
Diabetes labs:   Lab Results   Component Value Date    HGBA1C 9.4 (H) 11/14/2023      STOP Trulicity and START Ozempic 0.5 mg weekly (start with 0.25 mg dose for first week)

## 2023-11-21 NOTE — PROGRESS NOTES
"Patient ID: Dasha Curiel  : 1970    Chief Complaint: Hypertension (With labs ) and Hyperlipidemia    Allergies: Patient is allergic to iodine, meperidine, and promethazine.     History of Present Illness:  The patient is a 53 y.o. Black or  female who presents to clinic for follow up on Hypertension (With labs ) and Hyperlipidemia     Here for six-month follow-up. She reports that she has not been feeling well, has been fatigued lately. Increased thirst and urination. Her A1c is 9.4%, this is up from 6.6%. On low dose Trulicity, Metformin and Jardiance. Did not tolerate higher dose of Metformin or Trulicity. She does not check her blood sugar levels at home. Weight stable; does not follow ADA dietary guidelines.       Social History:  reports that she has never smoked. She has never been exposed to tobacco smoke. She has never used smokeless tobacco. She reports that she does not currently use alcohol. She reports that she does not currently use drugs after having used the following drugs: "Crack" cocaine.    Past Medical History:  has a past medical history of Anxiety, CHF (congestive heart failure), CKD (chronic kidney disease), Depression, Diabetes mellitus, type 2, DM (diabetes mellitus), Elevated antinuclear antibody (LLOYD) level, Gout, unspecified, Hypertension, Insomnia, Lumbar radiculopathy, right, and Neuropathy.    Current Medications:  Current Outpatient Medications   Medication Instructions    ADULT LOW DOSE ASPIRIN 81 mg, Oral, Nightly    ALPRAZolam (XANAX) 0.5 MG tablet TAKE 1 TABLET BY MOUTH 3 TIMES A DAY AS NEEDED FOR ANXIETY    amLODIPine (NORVASC) 10 mg, Oral, Daily    atorvastatin (LIPITOR) 20 mg, Oral, Daily    blood sugar diagnostic Strp To check BG 1 times daily, to use with insurance preferred meter    blood-glucose meter kit To check BG once times daily, to use with insurance preferred meter    carvediloL (COREG) 12.5 MG tablet TAKE 1 TABLET BY MOUTH TWO TIMES A " "DAY    cetirizine (ZYRTEC) 10 mg, Oral, Daily PRN    clobetasoL (TEMOVATE) 0.05 % cream Topical (Top), 2 times daily    DULoxetine (CYMBALTA) 60 mg, Oral, Daily    fluconazole (DIFLUCAN) 150 mg, Oral, Daily    fluticasone propionate (FLONASE) 50 mcg, Each Nostril, 2 times daily    gabapentin (NEURONTIN) 400 MG capsule TAKE 1 CAPSULE BY MOUTH EVERY MORNING  AND 2 AT BEDTIME    ibuprofen (ADVIL,MOTRIN) 800 MG tablet TAKE 1 TABLET BY MOUTH EVERY EIGHT HOURS    JARDIANCE 25 mg, Oral, Every morning    lancets Misc To check BG one times daily, to use with insurance preferred meter    levETIRAcetam (KEPPRA) 500 mg, Oral, 2 times daily    metFORMIN (GLUCOPHAGE-XR) 1,000 mg, Oral, Nightly    nitroGLYCERIN (NITROSTAT) 0.4 MG SL tablet 1 TABLET UNDER TONGUE EVERY  5 MINUTES NO MORE THAN 3 TABS THEN GO TO ER FOR CHEST PAIN    OZEMPIC 0.5 mg, Subcutaneous, Every 7 days    zolpidem (AMBIEN) 10 mg, Oral, Nightly PRN       Review of Systems   See HPI    Visit Vitals  /80 (BP Location: Left arm)   Pulse 83   Temp 98.2 °F (36.8 °C) (Temporal)   Ht 5' 2.01" (1.575 m)   Wt 82.6 kg (182 lb)   SpO2 99%   BMI 33.28 kg/m²       Physical Exam  Vitals reviewed.   Constitutional:       Appearance: Normal appearance.   Cardiovascular:      Heart sounds: Normal heart sounds.   Pulmonary:      Breath sounds: Normal breath sounds.   Skin:     General: Skin is warm and dry.   Neurological:      Mental Status: She is oriented to person, place, and time.          Labs Reviewed:  Chemistry:  Lab Results   Component Value Date     11/14/2023    K 4.0 11/14/2023    CHLORIDE 106 11/14/2023    BUN 17.0 11/14/2023    CREATININE 0.80 11/14/2023    EGFRNORACEVR 89 11/14/2023    GLUCOSE 242 (H) 11/14/2023    CALCIUM 9.8 11/14/2023    ALKPHOS 136 11/14/2023    LABPROT 7.0 11/14/2023    ALBUMIN 4.2 11/14/2023    BILIDIR 0.10 05/24/2019    IBILI 0.30 05/24/2019    AST 28 11/14/2023    ALT 25 11/14/2023    MG 2.5 (H) 05/23/2019    TSH 2.210 " 11/14/2023        Lab Results   Component Value Date    HGBA1C 9.4 (H) 11/14/2023        Hematology:  Lab Results   Component Value Date    WBC 9.35 11/14/2023    RBC 4.35 11/14/2023    HGB 12.1 11/14/2023    HCT 36.1 11/14/2023    MCV 83.0 11/14/2023    MCH 27.8 11/14/2023    MCHC 33.5 11/14/2023    RDW 11.6 11/14/2023     11/14/2023    MPV 11.3 11/14/2023       Lipid Panel:  Lab Results   Component Value Date    CHOL 133 11/14/2023    HDL 77 (H) 11/14/2023    DLDL 39.1 11/14/2023    TRIG 135 11/14/2023    TOTALCHOLEST 3.3 05/24/2019        Assessment & Plan:  1. Type 2 diabetes mellitus without complication, without long-term current use of insulin  Overview:  A1c 6.8% (08/2022)  Remains on Trulicity 0.75 mg weekly, Metformin 1000 mg, and Jardiance 25 mg daily.    Assessment & Plan:  Diabetes labs:   Lab Results   Component Value Date    HGBA1C 9.4 (H) 11/14/2023      STOP Trulicity and START Ozempic 0.5 mg weekly (start with 0.25 mg dose for first week)  Take all medications as directed; compliance is critical for managing your diabetes.   Follow American Diabetic Association (ADA) dietary guidelines for eating and implement exercise into your daily regimen.   Check your glucose levels each morning and record for review at follow up.    Orders:  -     blood-glucose meter kit; To check BG once times daily, to use with insurance preferred meter  Dispense: 1 each; Refill: 0  -     lancets Misc; To check BG one times daily, to use with insurance preferred meter  Dispense: 50 each; Refill: 0  -     Discontinue: blood sugar diagnostic Strp; To check BG one times daily, to use with insurance preferred meter  Dispense: 30 each; Refill: 0  -     semaglutide (OZEMPIC) 0.25 mg or 0.5 mg (2 mg/3 mL) pen injector; Inject 0.5 mg into the skin every 7 days.  Dispense: 3 mL; Refill: 0    2. Hypertension, unspecified type    Well controlled.   Continue Amlodipine 10 mg daily.  Low Sodium Diet (DASH Diet - Less than 2 grams  of sodium per day).  Monitor blood pressure daily and log. Report consistent numbers greater than 140/90.  Maintain healthy weight with goal BMI <30. Exercise 30 minutes per day, 5 days per week.    -     amLODIPine (NORVASC) 10 MG tablet; Take 1 tablet (10 mg total) by mouth once daily.  Dispense: 90 tablet; Refill: 3    3. Hyperlipidemia, unspecified hyperlipidemia type  -     atorvastatin (LIPITOR) 20 MG tablet; Take 1 tablet (20 mg total) by mouth once daily.  Dispense: 90 tablet; Refill: 3    4. Medication management  -     fluticasone propionate (FLONASE) 50 mcg/actuation nasal spray; 1 spray (50 mcg total) by Each Nostril route 2 (two) times daily.  Dispense: 11.1 mL; Refill: 5  -     gabapentin (NEURONTIN) 400 MG capsule; TAKE 1 CAPSULE BY MOUTH EVERY MORNING  AND 2 AT BEDTIME  Dispense: 270 capsule; Refill: 3  -     levETIRAcetam (KEPPRA) 500 MG Tab; Take 1 tablet (500 mg total) by mouth 2 (two) times daily.  Dispense: 180 tablet; Refill: 3         Future Appointments   Date Time Provider Department Center   12/18/2023  1:00 PM Brendan Case PMHNP [facility]   12/26/2023 11:30 AM [facility]       Follow up for 1 mo f/u, DM, no labs. Call sooner if needed.    CELY Kramer    Lab Frequency Next Occurrence   Ambulatory referral/consult to Physical/Occupational Therapy Once 03/15/2023   Mammo Digital Screening Bilat w/ Jayesh Once 03/27/2023   Ambulatory referral/consult to Dermatology Once 04/17/2023   Ambulatory referral/consult to General Surgery Once 05/22/2023

## 2023-12-18 ENCOUNTER — OFFICE VISIT (OUTPATIENT)
Dept: BEHAVIORAL HEALTH | Facility: CLINIC | Age: 53
End: 2023-12-18
Payer: MEDICAID

## 2023-12-18 VITALS
HEART RATE: 87 BPM | HEIGHT: 62 IN | TEMPERATURE: 98 F | BODY MASS INDEX: 32.91 KG/M2 | SYSTOLIC BLOOD PRESSURE: 126 MMHG | DIASTOLIC BLOOD PRESSURE: 78 MMHG | OXYGEN SATURATION: 99 % | WEIGHT: 178.81 LBS

## 2023-12-18 DIAGNOSIS — F41.1 GENERALIZED ANXIETY DISORDER: ICD-10-CM

## 2023-12-18 DIAGNOSIS — F33.9 EPISODE OF RECURRENT MAJOR DEPRESSIVE DISORDER, UNSPECIFIED DEPRESSION EPISODE SEVERITY: Primary | ICD-10-CM

## 2023-12-18 DIAGNOSIS — Z79.899 MEDICATION MANAGEMENT: ICD-10-CM

## 2023-12-18 DIAGNOSIS — G47.00 INSOMNIA, UNSPECIFIED TYPE: ICD-10-CM

## 2023-12-18 PROCEDURE — 3074F PR MOST RECENT SYSTOLIC BLOOD PRESSURE < 130 MM HG: ICD-10-PCS | Mod: CPTII,,, | Performed by: NURSE PRACTITIONER

## 2023-12-18 PROCEDURE — 3008F BODY MASS INDEX DOCD: CPT | Mod: CPTII,,, | Performed by: NURSE PRACTITIONER

## 2023-12-18 PROCEDURE — 1159F PR MEDICATION LIST DOCUMENTED IN MEDICAL RECORD: ICD-10-PCS | Mod: CPTII,,, | Performed by: NURSE PRACTITIONER

## 2023-12-18 PROCEDURE — 3046F HEMOGLOBIN A1C LEVEL >9.0%: CPT | Mod: CPTII,,, | Performed by: NURSE PRACTITIONER

## 2023-12-18 PROCEDURE — 3078F DIAST BP <80 MM HG: CPT | Mod: CPTII,,, | Performed by: NURSE PRACTITIONER

## 2023-12-18 PROCEDURE — 1160F PR REVIEW ALL MEDS BY PRESCRIBER/CLIN PHARMACIST DOCUMENTED: ICD-10-PCS | Mod: CPTII,,, | Performed by: NURSE PRACTITIONER

## 2023-12-18 PROCEDURE — 3008F PR BODY MASS INDEX (BMI) DOCUMENTED: ICD-10-PCS | Mod: CPTII,,, | Performed by: NURSE PRACTITIONER

## 2023-12-18 PROCEDURE — 3074F SYST BP LT 130 MM HG: CPT | Mod: CPTII,,, | Performed by: NURSE PRACTITIONER

## 2023-12-18 PROCEDURE — 99214 PR OFFICE/OUTPT VISIT, EST, LEVL IV, 30-39 MIN: ICD-10-PCS | Mod: ,,, | Performed by: NURSE PRACTITIONER

## 2023-12-18 PROCEDURE — 3078F PR MOST RECENT DIASTOLIC BLOOD PRESSURE < 80 MM HG: ICD-10-PCS | Mod: CPTII,,, | Performed by: NURSE PRACTITIONER

## 2023-12-18 PROCEDURE — 99214 OFFICE O/P EST MOD 30 MIN: CPT | Mod: ,,, | Performed by: NURSE PRACTITIONER

## 2023-12-18 PROCEDURE — 1159F MED LIST DOCD IN RCRD: CPT | Mod: CPTII,,, | Performed by: NURSE PRACTITIONER

## 2023-12-18 PROCEDURE — 3046F PR MOST RECENT HEMOGLOBIN A1C LEVEL > 9.0%: ICD-10-PCS | Mod: CPTII,,, | Performed by: NURSE PRACTITIONER

## 2023-12-18 PROCEDURE — 1160F RVW MEDS BY RX/DR IN RCRD: CPT | Mod: CPTII,,, | Performed by: NURSE PRACTITIONER

## 2023-12-18 RX ORDER — LANCETS 33 GAUGE
1 EACH MISCELLANEOUS DAILY
COMMUNITY
Start: 2023-11-21

## 2023-12-18 RX ORDER — ALPRAZOLAM 0.5 MG/1
TABLET ORAL
Qty: 90 TABLET | Refills: 0 | Status: SHIPPED | OUTPATIENT
Start: 2023-12-18 | End: 2024-01-17

## 2023-12-18 RX ORDER — DULOXETIN HYDROCHLORIDE 60 MG/1
60 CAPSULE, DELAYED RELEASE ORAL DAILY
Qty: 90 CAPSULE | Refills: 3 | Status: SHIPPED | OUTPATIENT
Start: 2023-12-18 | End: 2024-02-19 | Stop reason: SDUPTHER

## 2023-12-18 RX ORDER — ZOLPIDEM TARTRATE 10 MG/1
10 TABLET ORAL NIGHTLY PRN
Qty: 30 TABLET | Refills: 1 | Status: SHIPPED | OUTPATIENT
Start: 2023-12-18 | End: 2024-02-19 | Stop reason: SDUPTHER

## 2023-12-18 RX ORDER — BLOOD-GLUCOSE METER
1 EACH MISCELLANEOUS DAILY
COMMUNITY
Start: 2023-11-21

## 2023-12-18 NOTE — PROGRESS NOTES
"PSYCHIATRIC FOLLOW-UP VISIT NOTE    Chief Complaint   Patient presents with    Depression     2 month F/U         History of Present Illness  53 y.o. year old Black or  female with hx of MDD, CATARINO, insomnia seen today for follow-up appointment and medication management.  Patient reports that she has been doing very well since last visit.  Denies any recent depression.  No anhedonia, isolative behaviors, feelings of guilt or shame, hopelessness or helplessness, or thoughts of self-harm.  Recent intentional weight loss of 4 lb.  Anxiety also well-controlled.  Denies any recent panic attacks, no excessive worry or difficulty relaxing.  Denies any physical signs or symptoms of anxiety.  She has been sleeping well at night with current dose of Ambien.  Denies any side effects or adverse effects of current medication regimen. Patient denies SI/HI. Denies hallucinations and does not appear to be responding to internal stimuli or be internally preoccupied. No manic symptoms noted.       Objective:     Vitals:  Vitals:    12/18/23 1253   BP: 126/78   BP Location: Right arm   Patient Position: Sitting   BP Method: Large (Manual)   Pulse: 87   Temp: 97.5 °F (36.4 °C)   TempSrc: Temporal   SpO2: 99%   Weight: 81.1 kg (178 lb 12.7 oz)   Height: 5' 2.01" (1.575 m)       Wt Readings from Last 3 Encounters:   12/18/23 1253 81.1 kg (178 lb 12.7 oz)   11/21/23 1143 82.6 kg (182 lb)   10/17/23 1300 81.2 kg (179 lb 0.2 oz)         Medication:    Current Outpatient Medications:     ADULT LOW DOSE ASPIRIN 81 mg EC tablet, Take 81 mg by mouth nightly., Disp: , Rfl:     amLODIPine (NORVASC) 10 MG tablet, Take 1 tablet (10 mg total) by mouth once daily., Disp: 90 tablet, Rfl: 3    atorvastatin (LIPITOR) 20 MG tablet, Take 1 tablet (20 mg total) by mouth once daily., Disp: 90 tablet, Rfl: 3    blood sugar diagnostic Strp, To check BG 1 times daily, to use with insurance preferred meter, Disp: 30 each, Rfl: 0    carvediloL " (COREG) 12.5 MG tablet, TAKE 1 TABLET BY MOUTH TWO TIMES A DAY, Disp: 60 tablet, Rfl: 0    cetirizine (ZYRTEC) 10 MG tablet, Take 10 mg by mouth daily as needed., Disp: , Rfl:     clobetasoL (TEMOVATE) 0.05 % cream, Apply topically 2 (two) times daily., Disp: 60 g, Rfl: 2    fluticasone propionate (FLONASE) 50 mcg/actuation nasal spray, 1 spray (50 mcg total) by Each Nostril route 2 (two) times daily., Disp: 11.1 mL, Rfl: 5    gabapentin (NEURONTIN) 400 MG capsule, TAKE 1 CAPSULE BY MOUTH EVERY MORNING  AND 2 AT BEDTIME, Disp: 270 capsule, Rfl: 3    ibuprofen (ADVIL,MOTRIN) 800 MG tablet, TAKE 1 TABLET BY MOUTH EVERY EIGHT HOURS, Disp: 30 tablet, Rfl: 5    levETIRAcetam (KEPPRA) 500 MG Tab, Take 1 tablet (500 mg total) by mouth 2 (two) times daily., Disp: 180 tablet, Rfl: 3    metFORMIN (GLUCOPHAGE-XR) 500 MG ER 24hr tablet, TAKE 2 TABLETS BY MOUTH NIGHTLY, Disp: 60 tablet, Rfl: 0    nitroGLYCERIN (NITROSTAT) 0.4 MG SL tablet, 1 TABLET UNDER TONGUE EVERY  5 MINUTES NO MORE THAN 3 TABS THEN GO TO ER FOR CHEST PAIN, Disp: 25 tablet, Rfl: 2    semaglutide (OZEMPIC) 0.25 mg or 0.5 mg (2 mg/3 mL) pen injector, Inject 0.5 mg into the skin every 7 days., Disp: 3 mL, Rfl: 0    TRUE METRIX GLUCOSE METER Misc, Inject 1 each into the skin once daily., Disp: , Rfl:     TRUEPLUS LANCETS 33 gauge Misc, Apply 1 lancet  topically once daily., Disp: , Rfl:     ALPRAZolam (XANAX) 0.5 MG tablet, TAKE 1 TABLET BY MOUTH 3 TIMES A DAY AS NEEDED FOR ANXIETY, Disp: 90 tablet, Rfl: 0    DULoxetine (CYMBALTA) 60 MG capsule, Take 1 capsule (60 mg total) by mouth Daily., Disp: 90 capsule, Rfl: 3    zolpidem (AMBIEN) 10 mg Tab, Take 1 tablet (10 mg total) by mouth nightly as needed (insomnia)., Disp: 30 tablet, Rfl: 1       Significant Labs: - none at this time    Significant Imaging: - none at this time    Physical Exam  Vitals and nursing note reviewed.   Constitutional:       General: She is awake.      Appearance: Normal appearance.  "  Musculoskeletal:      Comments: Full ROM   Neurological:      Mental Status: She is alert.   Psychiatric:         Attention and Perception: Attention and perception normal. She does not perceive auditory or visual hallucinations.         Mood and Affect: Affect normal.         Speech: Speech normal.         Behavior: Behavior is cooperative.         Thought Content: Thought content does not include homicidal or suicidal ideation.         Cognition and Memory: Cognition and memory normal.          Review of Systems     Mental Status Exam:  Presentation:  - Appearance: 53 y.o. year old Black or  female, appears stated age, appears Casually dressed and Well groomed  - Motility: Erect when standing, Steady gait, No EPS or Tremors, No psychomotor agitation or retardation appreciated  - Behavior: calm, cooperative, good eye contact  Speech:  - Character/Organization: spontaneous, fluent, normal volume, normal rate, normal rhythm  Emotional State:  - Mood: "happy"   - Affect: congruent and appropriate  Thought:  - Process: logical, linear, organized , goal-directed  - Preoccupations: no ruminations, rituals, or phobias appreciated  - Delusions: no persecutory, paranoid, or grandiose delusions appreciated  - Perception: denies AVH, not actively responding to internal stimuli  - SI/HI: denies/denies  Sensorium & Intellect:  - Sensorium: AAOx4  - Memory: intact to recent and remote events  - Attention/Concentration: good/good  - Insight/Judgement: good/good    Gait: normal swing and stance  MSK:no rigidity appreciated    All other systems without acute issues unless noted in HPI      Assessment/Plan      ICD-10-CM ICD-9-CM    1. Episode of recurrent major depressive disorder, unspecified depression episode severity  F33.9 296.30       2. Generalized anxiety disorder  F41.1 300.02 ALPRAZolam (XANAX) 0.5 MG tablet      3. Insomnia, unspecified type  G47.00 780.52 zolpidem (AMBIEN) 10 mg Tab      4. Medication " management  Z79.899 V58.69 DULoxetine (CYMBALTA) 60 MG capsule         Continue current medications without change    Potential side effects and risks vs benefits of current treatment plan reviewed with patient. Applicable black box warnings reviewed. Encouraged patient not to alter dosages or abruptly discontinue medications without contacting prescriber first, due to risk of worsening symptoms and decompensation of mental status. Warned of risks associated with herbal remedies and supplements while taking psychotropic medications and of the need to consult prescriber prior to adding any of these to current regimen. Patient should abstain from abuse of alcohol, prescription medications, and illicit drugs. Reviewed when to contact clinic and/or seek emergent care, such as but not limited to, onset/worsening SI/HI, hallucinations, delusions, manic symptoms. Pt verbalized understanding and agreement of these warnings/recommendations and verbally consented to treatment plan.     checked. Results as expected. No suspected diversion or abuse of controlled substances.      Follow up in about 2 months (around 2/18/2024) for Medication Management.    Brendan Case, JUSTICEP

## 2023-12-22 DIAGNOSIS — E11.9 TYPE 2 DIABETES MELLITUS WITHOUT COMPLICATION, WITHOUT LONG-TERM CURRENT USE OF INSULIN: ICD-10-CM

## 2023-12-22 DIAGNOSIS — I10 HYPERTENSION, UNSPECIFIED TYPE: ICD-10-CM

## 2023-12-22 RX ORDER — CARVEDILOL 12.5 MG/1
TABLET ORAL
Qty: 60 TABLET | Refills: 0 | Status: SHIPPED | OUTPATIENT
Start: 2023-12-22 | End: 2024-01-18 | Stop reason: SDUPTHER

## 2023-12-22 RX ORDER — METFORMIN HYDROCHLORIDE 500 MG/1
1000 TABLET, EXTENDED RELEASE ORAL NIGHTLY
Qty: 60 TABLET | Refills: 0 | Status: SHIPPED | OUTPATIENT
Start: 2023-12-22 | End: 2024-01-17

## 2024-01-05 ENCOUNTER — OFFICE VISIT (OUTPATIENT)
Dept: FAMILY MEDICINE | Facility: CLINIC | Age: 54
End: 2024-01-05
Payer: MEDICAID

## 2024-01-05 VITALS
HEIGHT: 62 IN | OXYGEN SATURATION: 97 % | BODY MASS INDEX: 33.16 KG/M2 | SYSTOLIC BLOOD PRESSURE: 130 MMHG | HEART RATE: 60 BPM | DIASTOLIC BLOOD PRESSURE: 80 MMHG | TEMPERATURE: 98 F | WEIGHT: 180.19 LBS

## 2024-01-05 DIAGNOSIS — M62.838 MUSCLE SPASM: ICD-10-CM

## 2024-01-05 DIAGNOSIS — E11.9 TYPE 2 DIABETES MELLITUS WITHOUT COMPLICATION, WITHOUT LONG-TERM CURRENT USE OF INSULIN: Primary | ICD-10-CM

## 2024-01-05 PROCEDURE — 1160F RVW MEDS BY RX/DR IN RCRD: CPT | Mod: CPTII,,, | Performed by: NURSE PRACTITIONER

## 2024-01-05 PROCEDURE — 3075F SYST BP GE 130 - 139MM HG: CPT | Mod: CPTII,,, | Performed by: NURSE PRACTITIONER

## 2024-01-05 PROCEDURE — 99213 OFFICE O/P EST LOW 20 MIN: CPT | Mod: ,,, | Performed by: NURSE PRACTITIONER

## 2024-01-05 PROCEDURE — 3079F DIAST BP 80-89 MM HG: CPT | Mod: CPTII,,, | Performed by: NURSE PRACTITIONER

## 2024-01-05 PROCEDURE — 1159F MED LIST DOCD IN RCRD: CPT | Mod: CPTII,,, | Performed by: NURSE PRACTITIONER

## 2024-01-05 PROCEDURE — 3008F BODY MASS INDEX DOCD: CPT | Mod: CPTII,,, | Performed by: NURSE PRACTITIONER

## 2024-01-05 RX ORDER — CYCLOBENZAPRINE HCL 5 MG
5 TABLET ORAL 3 TIMES DAILY PRN
Qty: 30 TABLET | Refills: 0 | Status: SHIPPED | OUTPATIENT
Start: 2024-01-05 | End: 2024-01-15

## 2024-01-05 RX ORDER — SEMAGLUTIDE 1.34 MG/ML
1 INJECTION, SOLUTION SUBCUTANEOUS
Qty: 3 ML | Refills: 11 | Status: SHIPPED | OUTPATIENT
Start: 2024-01-05 | End: 2025-01-04

## 2024-01-05 NOTE — PROGRESS NOTES
"Patient ID: Dasha Curiel  : 1970    Chief Complaint: 1 month follow up Dm    Allergies: Patient is allergic to iodine, meperidine, and promethazine.     History of Present Illness:  The patient is a 53 y.o. Black or  female who presents to clinic for follow up on 1 month follow up Dm   Here for 1 month f/u on diabetes.  On recent blood work her A1c was increased to 9.4%, this was up from 6.6% previously.  Medication changes were made last visit (stopped Trulicity and started Ozempic, continued same oral regimen) and she returns today for follow-up. She likes the new medication; fasting glucose levels seem to be coming down, most in the 140's.    Social History:  reports that she has never smoked. She has never been exposed to tobacco smoke. She has never used smokeless tobacco. She reports that she does not currently use alcohol. She reports that she does not currently use drugs after having used the following drugs: "Crack" cocaine.    Past Medical History:  has a past medical history of Anxiety, CHF (congestive heart failure), CKD (chronic kidney disease), Depression, Diabetes mellitus, type 2, DM (diabetes mellitus), Elevated antinuclear antibody (LLOYD) level, Gout, unspecified, Hypertension, Insomnia, Lumbar radiculopathy, right, and Neuropathy.    Current Medications:  Current Outpatient Medications   Medication Instructions    ADULT LOW DOSE ASPIRIN 81 mg, Oral, Nightly    ALPRAZolam (XANAX) 0.5 MG tablet TAKE 1 TABLET BY MOUTH 3 TIMES A DAY AS NEEDED FOR ANXIETY    amLODIPine (NORVASC) 10 mg, Oral, Daily    atorvastatin (LIPITOR) 20 mg, Oral, Daily    blood sugar diagnostic Strp To check BG 1 times daily, to use with insurance preferred meter    carvediloL (COREG) 12.5 MG tablet TAKE 1 TABLET BY MOUTH TWO TIMES A DAY    cetirizine (ZYRTEC) 10 mg, Oral, Daily PRN    clobetasoL (TEMOVATE) 0.05 % cream Topical (Top), 2 times daily    cyclobenzaprine (FLEXERIL) 5 mg, Oral, 3 times " "daily PRN    DULoxetine (CYMBALTA) 60 mg, Oral, Daily    fluticasone propionate (FLONASE) 50 mcg, Each Nostril, 2 times daily    gabapentin (NEURONTIN) 400 MG capsule TAKE 1 CAPSULE BY MOUTH EVERY MORNING  AND 2 AT BEDTIME    ibuprofen (ADVIL,MOTRIN) 800 MG tablet TAKE 1 TABLET BY MOUTH EVERY EIGHT HOURS    levETIRAcetam (KEPPRA) 500 mg, Oral, 2 times daily    metFORMIN (GLUCOPHAGE-XR) 1,000 mg, Oral, Nightly    nitroGLYCERIN (NITROSTAT) 0.4 MG SL tablet 1 TABLET UNDER TONGUE EVERY  5 MINUTES NO MORE THAN 3 TABS THEN GO TO ER FOR CHEST PAIN    OZEMPIC 1 mg, Subcutaneous, Every 7 days    TRUE METRIX GLUCOSE METER Misc 1 each, Intradermal, Daily    TRUEPLUS LANCETS 33 gauge Misc 1 lancet , Topical (Top), Daily    zolpidem (AMBIEN) 10 mg, Oral, Nightly PRN       Review of Systems   See HPI    Visit Vitals  /80 (BP Location: Right arm, Patient Position: Sitting)   Pulse 60   Temp 97.9 °F (36.6 °C)   Ht 5' 2.01" (1.575 m)   Wt 81.7 kg (180 lb 3.2 oz)   SpO2 97%   BMI 32.95 kg/m²       Physical Exam  Vitals reviewed.   Constitutional:       Appearance: Normal appearance.   Cardiovascular:      Heart sounds: Normal heart sounds.   Pulmonary:      Breath sounds: Normal breath sounds.   Skin:     General: Skin is warm and dry.   Neurological:      Mental Status: She is oriented to person, place, and time.          Labs Reviewed:  Chemistry:  Lab Results   Component Value Date     11/14/2023    K 4.0 11/14/2023    CHLORIDE 106 11/14/2023    BUN 17.0 11/14/2023    CREATININE 0.80 11/14/2023    EGFRNORACEVR 89 11/14/2023    GLUCOSE 242 (H) 11/14/2023    CALCIUM 9.8 11/14/2023    ALKPHOS 136 11/14/2023    LABPROT 7.0 11/14/2023    ALBUMIN 4.2 11/14/2023    BILIDIR 0.10 05/24/2019    IBILI 0.30 05/24/2019    AST 28 11/14/2023    ALT 25 11/14/2023    MG 2.5 (H) 05/23/2019    TSH 2.210 11/14/2023        Lab Results   Component Value Date    HGBA1C 9.4 (H) 11/14/2023        Hematology:  Lab Results   Component Value Date "    WBC 9.35 11/14/2023    RBC 4.35 11/14/2023    HGB 12.1 11/14/2023    HCT 36.1 11/14/2023    MCV 83.0 11/14/2023    MCH 27.8 11/14/2023    MCHC 33.5 11/14/2023    RDW 11.6 11/14/2023     11/14/2023    MPV 11.3 11/14/2023       Lipid Panel:  Lab Results   Component Value Date    CHOL 133 11/14/2023    HDL 77 (H) 11/14/2023    DLDL 39.1 11/14/2023    TRIG 135 11/14/2023    TOTALCHOLEST 3.3 05/24/2019        Assessment & Plan:  1. Type 2 diabetes mellitus without complication, without long-term current use of insulin  Overview:  A1c 6.8% (08/2022)  Remains on Trulicity 0.75 mg weekly, Metformin 1000 mg, and Jardiance 25 mg daily.  Stop Trulicity and Start Ozempic (11/21/23)    Assessment & Plan:  Increase Ozempic to 1 mg weekly    Orders:  -     semaglutide (OZEMPIC) 1 mg/dose (4 mg/3 mL); Inject 1 mg into the skin every 7 days.  Dispense: 3 mL; Refill: 11    2. Muscle spasm  -     cyclobenzaprine (FLEXERIL) 5 MG tablet; Take 1 tablet (5 mg total) by mouth 3 (three) times daily as needed for Muscle spasms.  Dispense: 30 tablet; Refill: 0         Future Appointments   Date Time Provider Department Center   2/19/2024 10:30 AM Brendan Case PMMICKEY [facility]   4/9/2024  9:10 AM LAB, [facility] LABORATORY DRAW STATION [facility] [place]   4/15/2024 11:30 AM Zulma Zhang FNP-C [facility]       Follow up for 3 mo f/u, DM, Fasting labs prior. Call sooner if needed.    CELY Kramer    Lab Frequency Next Occurrence   Ambulatory referral/consult to Physical/Occupational Therapy Once 03/15/2023   Mammo Digital Screening Bilat w/ [facility] Once 03/27/2023   Ambulatory referral/consult to Dermatology Once 04/17/2023   Ambulatory referral/consult to General Surgery Once 05/22/2023

## 2024-01-09 LAB — BEAKER SEE SCANNED REPORT: NORMAL

## 2024-01-17 DIAGNOSIS — F41.1 GENERALIZED ANXIETY DISORDER: ICD-10-CM

## 2024-01-17 DIAGNOSIS — E11.9 TYPE 2 DIABETES MELLITUS WITHOUT COMPLICATION, WITHOUT LONG-TERM CURRENT USE OF INSULIN: ICD-10-CM

## 2024-01-17 RX ORDER — ALPRAZOLAM 0.5 MG/1
TABLET ORAL
Qty: 90 TABLET | Refills: 0 | Status: SHIPPED | OUTPATIENT
Start: 2024-01-17 | End: 2024-02-19 | Stop reason: SDUPTHER

## 2024-01-17 RX ORDER — METFORMIN HYDROCHLORIDE 500 MG/1
1000 TABLET, EXTENDED RELEASE ORAL NIGHTLY
Qty: 60 TABLET | Refills: 5 | Status: SHIPPED | OUTPATIENT
Start: 2024-01-17

## 2024-01-18 ENCOUNTER — TELEPHONE (OUTPATIENT)
Dept: FAMILY MEDICINE | Facility: CLINIC | Age: 54
End: 2024-01-18
Payer: MEDICAID

## 2024-01-18 DIAGNOSIS — I10 HYPERTENSION, UNSPECIFIED TYPE: ICD-10-CM

## 2024-01-18 RX ORDER — CARVEDILOL 12.5 MG/1
12.5 TABLET ORAL 2 TIMES DAILY
Qty: 60 TABLET | Refills: 2 | Status: SHIPPED | OUTPATIENT
Start: 2024-01-18 | End: 2024-03-07 | Stop reason: SDUPTHER

## 2024-01-18 RX ORDER — TIZANIDINE 4 MG/1
4 TABLET ORAL EVERY 6 HOURS PRN
Qty: 30 TABLET | Refills: 5 | Status: SHIPPED | OUTPATIENT
Start: 2024-01-18 | End: 2024-03-07

## 2024-01-18 RX ORDER — CARVEDILOL 12.5 MG/1
TABLET ORAL
Qty: 60 TABLET | Refills: 0 | OUTPATIENT
Start: 2024-01-18

## 2024-01-18 NOTE — TELEPHONE ENCOUNTER
[facility] faxed a request  for refills on tizanidine. She last filled them 1/4. They were not on her med list.

## 2024-02-19 ENCOUNTER — OFFICE VISIT (OUTPATIENT)
Dept: BEHAVIORAL HEALTH | Facility: CLINIC | Age: 54
End: 2024-02-19
Payer: MEDICAID

## 2024-02-19 VITALS
TEMPERATURE: 98 F | HEART RATE: 84 BPM | BODY MASS INDEX: 32.91 KG/M2 | SYSTOLIC BLOOD PRESSURE: 126 MMHG | DIASTOLIC BLOOD PRESSURE: 84 MMHG | OXYGEN SATURATION: 97 % | HEIGHT: 62 IN | WEIGHT: 178.81 LBS

## 2024-02-19 DIAGNOSIS — F33.9 EPISODE OF RECURRENT MAJOR DEPRESSIVE DISORDER, UNSPECIFIED DEPRESSION EPISODE SEVERITY: Primary | ICD-10-CM

## 2024-02-19 DIAGNOSIS — F41.1 GENERALIZED ANXIETY DISORDER: ICD-10-CM

## 2024-02-19 DIAGNOSIS — G47.00 INSOMNIA, UNSPECIFIED TYPE: ICD-10-CM

## 2024-02-19 DIAGNOSIS — Z79.899 MEDICATION MANAGEMENT: ICD-10-CM

## 2024-02-19 PROCEDURE — 3079F DIAST BP 80-89 MM HG: CPT | Mod: CPTII,,, | Performed by: NURSE PRACTITIONER

## 2024-02-19 PROCEDURE — 3074F SYST BP LT 130 MM HG: CPT | Mod: CPTII,,, | Performed by: NURSE PRACTITIONER

## 2024-02-19 PROCEDURE — 1160F RVW MEDS BY RX/DR IN RCRD: CPT | Mod: CPTII,,, | Performed by: NURSE PRACTITIONER

## 2024-02-19 PROCEDURE — 99214 OFFICE O/P EST MOD 30 MIN: CPT | Mod: ,,, | Performed by: NURSE PRACTITIONER

## 2024-02-19 PROCEDURE — 1159F MED LIST DOCD IN RCRD: CPT | Mod: CPTII,,, | Performed by: NURSE PRACTITIONER

## 2024-02-19 PROCEDURE — 3008F BODY MASS INDEX DOCD: CPT | Mod: CPTII,,, | Performed by: NURSE PRACTITIONER

## 2024-02-19 RX ORDER — ALPRAZOLAM 0.5 MG/1
0.5 TABLET ORAL 3 TIMES DAILY PRN
Qty: 90 TABLET | Refills: 1 | Status: SHIPPED | OUTPATIENT
Start: 2024-02-19 | End: 2024-04-18 | Stop reason: SDUPTHER

## 2024-02-19 RX ORDER — ZOLPIDEM TARTRATE 10 MG/1
10 TABLET ORAL NIGHTLY PRN
Qty: 30 TABLET | Refills: 1 | Status: SHIPPED | OUTPATIENT
Start: 2024-02-19 | End: 2024-04-18 | Stop reason: SDUPTHER

## 2024-02-19 RX ORDER — DULOXETIN HYDROCHLORIDE 60 MG/1
60 CAPSULE, DELAYED RELEASE ORAL DAILY
Qty: 90 CAPSULE | Refills: 1 | Status: SHIPPED | OUTPATIENT
Start: 2024-02-19 | End: 2024-04-18 | Stop reason: SDUPTHER

## 2024-02-19 NOTE — PROGRESS NOTES
"PSYCHIATRIC FOLLOW-UP VISIT NOTE    Chief Complaint   Patient presents with    Depression     2 month medication management         History of Present Illness  54 y.o. year old Black or  female with hx of MDD, Fermin, and insomnia seen today for follow-up appointment and medication management.  Patient reports period of increased depression that lasted until her birthday 1 week ago.  States she and her adult daughter, who lives with her, has been having some discord.  Daughter has been rude and unappreciative at times and this reminds patient of her ex-.  States that on her birthday her family brought her up to eat and celebrated her and since then she has been feeling much better.  Depression has decreased over the past week.  She does not feel that medication changes are warranted at this time.  Encouraged her to monitor for any decompensation in mood and contact the clinic prior to her next follow-up visit so that we can move to a sooner date if needed.  She verbalized understanding and agreement.  Anxiety has been minimal.  Good symptom control in that area with current medication regimen.  She is sleeping well at night and feels rested in the morning. Patient denies SI/HI. Denies hallucinations and does not appear to be responding to internal stimuli or be internally preoccupied. No manic symptoms noted.       Objective:     Vitals:  Vitals:    02/19/24 1022   BP: 126/84   BP Location: Right arm   Patient Position: Sitting   BP Method: Large (Manual)   Pulse: 84   Temp: 97.7 °F (36.5 °C)   TempSrc: Temporal   SpO2: 97%   Weight: 81.1 kg (178 lb 12.7 oz)   Height: 5' 2.01" (1.575 m)       Wt Readings from Last 3 Encounters:   02/19/24 1022 81.1 kg (178 lb 12.7 oz)   01/05/24 1029 81.7 kg (180 lb 3.2 oz)   12/18/23 1253 81.1 kg (178 lb 12.7 oz)         Medication:    Current Outpatient Medications:     ADULT LOW DOSE ASPIRIN 81 mg EC tablet, Take 81 mg by mouth nightly., Disp: , Rfl:     " amLODIPine (NORVASC) 10 MG tablet, Take 1 tablet (10 mg total) by mouth once daily., Disp: 90 tablet, Rfl: 3    atorvastatin (LIPITOR) 20 MG tablet, Take 1 tablet (20 mg total) by mouth once daily., Disp: 90 tablet, Rfl: 3    blood sugar diagnostic Strp, To check BG 1 times daily, to use with insurance preferred meter, Disp: 30 each, Rfl: 0    carvediloL (COREG) 12.5 MG tablet, Take 1 tablet (12.5 mg total) by mouth 2 (two) times daily., Disp: 60 tablet, Rfl: 2    cetirizine (ZYRTEC) 10 MG tablet, Take 10 mg by mouth daily as needed., Disp: , Rfl:     clobetasoL (TEMOVATE) 0.05 % cream, Apply topically 2 (two) times daily., Disp: 60 g, Rfl: 2    fluticasone propionate (FLONASE) 50 mcg/actuation nasal spray, 1 spray (50 mcg total) by Each Nostril route 2 (two) times daily., Disp: 11.1 mL, Rfl: 5    gabapentin (NEURONTIN) 400 MG capsule, TAKE 1 CAPSULE BY MOUTH EVERY MORNING  AND 2 AT BEDTIME, Disp: 270 capsule, Rfl: 3    ibuprofen (ADVIL,MOTRIN) 800 MG tablet, TAKE 1 TABLET BY MOUTH EVERY EIGHT HOURS, Disp: 30 tablet, Rfl: 5    levETIRAcetam (KEPPRA) 500 MG Tab, Take 1 tablet (500 mg total) by mouth 2 (two) times daily., Disp: 180 tablet, Rfl: 3    metFORMIN (GLUCOPHAGE-XR) 500 MG ER 24hr tablet, TAKE 2 TABLETS BY MOUTH NIGHTLY, Disp: 60 tablet, Rfl: 5    nitroGLYCERIN (NITROSTAT) 0.4 MG SL tablet, 1 TABLET UNDER TONGUE EVERY  5 MINUTES NO MORE THAN 3 TABS THEN GO TO ER FOR CHEST PAIN, Disp: 25 tablet, Rfl: 2    semaglutide (OZEMPIC) 1 mg/dose (4 mg/3 mL), Inject 1 mg into the skin every 7 days., Disp: 3 mL, Rfl: 11    TRUE METRIX GLUCOSE METER Misc, Inject 1 each into the skin once daily., Disp: , Rfl:     TRUEPLUS LANCETS 33 gauge Misc, Apply 1 lancet  topically once daily., Disp: , Rfl:     ALPRAZolam (XANAX) 0.5 MG tablet, Take 1 tablet (0.5 mg total) by mouth 3 (three) times daily as needed for Anxiety., Disp: 90 tablet, Rfl: 1    DULoxetine (CYMBALTA) 60 MG capsule, Take 1 capsule (60 mg total) by mouth Daily.,  "Disp: 90 capsule, Rfl: 1    tiZANidine (ZANAFLEX) 4 MG tablet, Take 1 tablet (4 mg total) by mouth every 6 (six) hours as needed (muscle spasms). (Patient not taking: Reported on 2/19/2024), Disp: 30 tablet, Rfl: 5    zolpidem (AMBIEN) 10 mg Tab, Take 1 tablet (10 mg total) by mouth nightly as needed (insomnia)., Disp: 30 tablet, Rfl: 1       Significant Labs: - none at this time    Significant Imaging: - none at this time    Physical Exam  Vitals and nursing note reviewed.   Constitutional:       General: She is awake.      Appearance: Normal appearance.   Musculoskeletal:      Comments: Full ROM   Neurological:      Mental Status: She is alert.   Psychiatric:         Attention and Perception: Attention and perception normal. She does not perceive auditory or visual hallucinations.         Mood and Affect: Affect normal. Mood is depressed.         Speech: Speech normal.         Behavior: Behavior is withdrawn. Behavior is cooperative.         Thought Content: Thought content does not include homicidal or suicidal ideation.         Cognition and Memory: Cognition and memory normal.         Judgment: Judgment normal.          Review of Systems     Mental Status Exam:  Presentation:  - Appearance: 54 y.o. year old Black or  female, appears stated age, appears Casually dressed and Well groomed  - Motility: Erect when standing, Steady gait, No EPS or Tremors, No psychomotor agitation or retardation appreciated  - Behavior: anxious, cooperative, maintains eye contact  Speech:  - Character/Organization: spontaneous, fluent, normal volume, normal rate, normal rhythm  Emotional State:  - Mood: "depressed, but getting better"   - Affect: congruent and sad  Thought:  - Process: logical, linear, organized , goal-directed  - Preoccupations: no ruminations, rituals, or phobias appreciated  - Delusions: no persecutory, paranoid, or grandiose delusions appreciated  - Perception: denies AVH, not actively responding " to internal stimuli  - SI/HI: denies/denies  Sensorium & Intellect:  - Sensorium: AAOx4  - Memory: intact to recent and remote events  - Attention/Concentration: good/good  - Insight/Judgement: good/good    Gait: normal swing and stance  MSK:no rigidity appreciated    All other systems without acute issues unless noted in HPI      Assessment/Plan      ICD-10-CM ICD-9-CM    1. Episode of recurrent major depressive disorder, unspecified depression episode severity  F33.9 296.30       2. Generalized anxiety disorder  F41.1 300.02 ALPRAZolam (XANAX) 0.5 MG tablet      3. Insomnia, unspecified type  G47.00 780.52 zolpidem (AMBIEN) 10 mg Tab      4. Medication management  Z79.899 V58.69 DULoxetine (CYMBALTA) 60 MG capsule         Continue current medications without change    Potential side effects and risks vs benefits of current treatment plan reviewed with patient. Applicable black box warnings reviewed. Encouraged patient not to alter dosages or abruptly discontinue medications without contacting prescriber first, due to risk of worsening symptoms and decompensation of mental status. Warned of risks associated with herbal remedies and supplements while taking psychotropic medications and of the need to consult prescriber prior to adding any of these to current regimen. Patient should abstain from abuse of alcohol, prescription medications, and illicit drugs. Reviewed when to contact clinic and/or seek emergent care, such as but not limited to, onset/worsening SI/HI, hallucinations, delusions, manic symptoms. Pt verbalized understanding and agreement of these warnings/recommendations and verbally consented to treatment plan.     checked. Results as expected. No suspected diversion or abuse of controlled substances.    Follow up in about 2 months (around 4/19/2024) for Medication Management.    LORE Chun

## 2024-03-07 ENCOUNTER — OFFICE VISIT (OUTPATIENT)
Dept: FAMILY MEDICINE | Facility: CLINIC | Age: 54
End: 2024-03-07
Payer: MEDICAID

## 2024-03-07 VITALS
HEART RATE: 85 BPM | WEIGHT: 175.19 LBS | TEMPERATURE: 98 F | DIASTOLIC BLOOD PRESSURE: 74 MMHG | BODY MASS INDEX: 32.24 KG/M2 | OXYGEN SATURATION: 97 % | HEIGHT: 62 IN | SYSTOLIC BLOOD PRESSURE: 126 MMHG

## 2024-03-07 DIAGNOSIS — M62.830 SPASM OF MUSCLE, BACK: ICD-10-CM

## 2024-03-07 DIAGNOSIS — I10 HYPERTENSION, UNSPECIFIED TYPE: ICD-10-CM

## 2024-03-07 DIAGNOSIS — M25.571 ARTHRALGIA OF BOTH ANKLES: Primary | ICD-10-CM

## 2024-03-07 DIAGNOSIS — E11.9 TYPE 2 DIABETES MELLITUS WITHOUT COMPLICATION, WITHOUT LONG-TERM CURRENT USE OF INSULIN: ICD-10-CM

## 2024-03-07 DIAGNOSIS — D86.3 CUTANEOUS SARCOIDOSIS: ICD-10-CM

## 2024-03-07 DIAGNOSIS — M25.572 ARTHRALGIA OF BOTH ANKLES: Primary | ICD-10-CM

## 2024-03-07 PROCEDURE — 3078F DIAST BP <80 MM HG: CPT | Mod: CPTII,,, | Performed by: NURSE PRACTITIONER

## 2024-03-07 PROCEDURE — 3074F SYST BP LT 130 MM HG: CPT | Mod: CPTII,,, | Performed by: NURSE PRACTITIONER

## 2024-03-07 PROCEDURE — 1159F MED LIST DOCD IN RCRD: CPT | Mod: CPTII,,, | Performed by: NURSE PRACTITIONER

## 2024-03-07 PROCEDURE — 1160F RVW MEDS BY RX/DR IN RCRD: CPT | Mod: CPTII,,, | Performed by: NURSE PRACTITIONER

## 2024-03-07 PROCEDURE — 3008F BODY MASS INDEX DOCD: CPT | Mod: CPTII,,, | Performed by: NURSE PRACTITIONER

## 2024-03-07 PROCEDURE — 99214 OFFICE O/P EST MOD 30 MIN: CPT | Mod: ,,, | Performed by: NURSE PRACTITIONER

## 2024-03-07 RX ORDER — CYCLOBENZAPRINE HCL 5 MG
5 TABLET ORAL 3 TIMES DAILY PRN
Qty: 30 TABLET | Refills: 2 | Status: SHIPPED | OUTPATIENT
Start: 2024-03-07

## 2024-03-07 RX ORDER — CLOBETASOL PROPIONATE 0.5 MG/G
CREAM TOPICAL 2 TIMES DAILY
Qty: 60 G | Refills: 11 | Status: SHIPPED | OUTPATIENT
Start: 2024-03-07

## 2024-03-07 RX ORDER — IBUPROFEN 800 MG/1
800 TABLET ORAL 3 TIMES DAILY
COMMUNITY

## 2024-03-07 RX ORDER — CARVEDILOL 12.5 MG/1
12.5 TABLET ORAL 2 TIMES DAILY
Qty: 60 TABLET | Refills: 11 | Status: SHIPPED | OUTPATIENT
Start: 2024-03-07

## 2024-03-07 NOTE — PROGRESS NOTES
"Patient ID: Dasha Curiel  : 1970    Chief Complaint: Ankle Pain (Ankle pain, multiple joint pain)    Allergies: Patient is allergic to iodine, meperidine, and promethazine.     History of Present Illness:  The patient is a 54 y.o. Black or  female who presents to clinic for follow up on Ankle Pain (Ankle pain, multiple joint pain)     Complains of bilateral ankle pain. Went to the ER "years ago" and was told to have gout, she was given a steroid shot and medication to take daily for gout and then did not have an issue since then. She has had bilateral ankle pain without swelling over the last few weeks. Pain is significant enough to have her trying to avoid walking. She denies injury.      She was diagnosed with cutaneous sarcoidosis last year.  Has 3 cutaneous lesions that have remained stable.  They do not bother her, she was applying clobetasol but that has not diminished or change the lesions in any way.  She does not have any additional lesions.  She denies any issue with dyspnea.    She also complains of intermittent spasms of the back. They occur day and night, not positional.     Social History:  reports that she has never smoked. She has never been exposed to tobacco smoke. She has never used smokeless tobacco. She reports that she does not currently use alcohol. She reports that she does not currently use drugs after having used the following drugs: "Crack" cocaine.    Past Medical History:  has a past medical history of Anxiety, CHF (congestive heart failure), CKD (chronic kidney disease), Depression, Diabetes mellitus, type 2, DM (diabetes mellitus), Elevated antinuclear antibody (LLOYD) level, Gout, unspecified, Hypertension, Insomnia, Lumbar radiculopathy, right, and Neuropathy.    Current Medications:  Current Outpatient Medications   Medication Instructions    ADULT LOW DOSE ASPIRIN 81 mg, Oral, Nightly    ALPRAZolam (XANAX) 0.5 mg, Oral, 3 times daily PRN    amLODIPine " "(NORVASC) 10 mg, Oral, Daily    atorvastatin (LIPITOR) 20 mg, Oral, Daily    blood sugar diagnostic Strp To check BG 1 times daily, to use with insurance preferred meter    blood sugar diagnostic Strp To check BG one time daily, to use with insurance preferred meter    carvediloL (COREG) 12.5 mg, Oral, 2 times daily    cetirizine (ZYRTEC) 10 mg, Oral, Daily PRN    clobetasoL (TEMOVATE) 0.05 % cream Topical (Top), 2 times daily    cyclobenzaprine (FLEXERIL) 5 mg, Oral, 3 times daily PRN    DULoxetine (CYMBALTA) 60 mg, Oral, Daily    fluticasone propionate (FLONASE) 50 mcg, Each Nostril, 2 times daily    gabapentin (NEURONTIN) 400 MG capsule TAKE 1 CAPSULE BY MOUTH EVERY MORNING  AND 2 AT BEDTIME    ibuprofen (ADVIL,MOTRIN) 800 MG tablet TAKE 1 TABLET BY MOUTH EVERY EIGHT HOURS    ibuprofen (ADVIL,MOTRIN) 800 mg, Oral, 3 times daily    levETIRAcetam (KEPPRA) 500 mg, Oral, 2 times daily    metFORMIN (GLUCOPHAGE-XR) 1,000 mg, Oral, Nightly    nitroGLYCERIN (NITROSTAT) 0.4 MG SL tablet 1 TABLET UNDER TONGUE EVERY  5 MINUTES NO MORE THAN 3 TABS THEN GO TO ER FOR CHEST PAIN    OZEMPIC 1 mg, Subcutaneous, Every 7 days    TRUE METRIX GLUCOSE METER Misc 1 each, Intradermal, Daily    TRUEPLUS LANCETS 33 gauge Misc 1 lancet , Topical (Top), Daily    zolpidem (AMBIEN) 10 mg, Oral, Nightly PRN       Review of Systems   See HPI    Visit Vitals  /74 (BP Location: Left arm)   Pulse 85   Temp 97.5 °F (36.4 °C) (Oral)   Ht 5' 2" (1.575 m)   Wt 79.5 kg (175 lb 3.2 oz)   SpO2 97%   BMI 32.04 kg/m²       Physical Exam  Vitals reviewed.   Constitutional:       Appearance: Normal appearance.   Cardiovascular:      Heart sounds: Normal heart sounds.   Pulmonary:      Breath sounds: Normal breath sounds.   Musculoskeletal:         General: No swelling, tenderness, deformity or signs of injury.      Right lower leg: No edema.      Left lower leg: No edema.      Comments: No ankle tenderness, swelling, redness   Skin:     General: Skin " is warm and dry.      Comments: Flat dark lesion to left side.   Neurological:      Mental Status: She is oriented to person, place, and time.          Assessment & Plan:  1. Arthralgia of both ankles  Comments:  Possibly secondary to sarcoidosis.   Start Ibuprofen 800 mg TID x 2 weeks.   Fu 2 weeks.    2. Cutaneous sarcoidosis  Comments:  Discontinue routine use of Clobetasol.  Overview:  Referral to Derm sent 12/2022    Orders:  -     clobetasoL (TEMOVATE) 0.05 % cream; Apply topically 2 (two) times daily.  Dispense: 60 g; Refill: 11    3. Hypertension, unspecified type  Overview:  Coreg 12.5 mg b.i.d., amlodipine 10 mg daily    Assessment & Plan:  Well controlled.   Continue current medicaitons.   Low Sodium Diet (DASH Diet - Less than 2 grams of sodium per day).  Monitor blood pressure daily and log. Report consistent numbers greater than 140/90.  Maintain healthy weight with goal BMI <30. Exercise 30 minutes per day, 5 days per week.  Smoking cessation encouraged to aid in BP reduction.      Orders:  -     carvediloL (COREG) 12.5 MG tablet; Take 1 tablet (12.5 mg total) by mouth 2 (two) times daily.  Dispense: 60 tablet; Refill: 11    4. Spasm of muscle, back  Comments:  Ibuprofen as directed, muscle relaxers t.i.d. as needed.  Apply heat for 15 minutes several times daily.  Orders:  -     cyclobenzaprine (FLEXERIL) 5 MG tablet; Take 1 tablet (5 mg total) by mouth 3 (three) times daily as needed for Muscle spasms.  Dispense: 30 tablet; Refill: 2    5. Type 2 diabetes mellitus without complication, without long-term current use of insulin  Overview:  A1c 6.8% (08/2022)  Remains on Trulicity 0.75 mg weekly, Metformin 1000 mg, and Jardiance 25 mg daily.  Stop Trulicity and Start Ozempic (11/21/23)    Orders:  -     blood sugar diagnostic Strp; To check BG one time daily, to use with insurance preferred meter  Dispense: 35 each; Refill: 11         Future Appointments   Date Time Provider Department Center    3/21/2024  2:00 PM Vicente, Zulma L., FNP-C [facility]   4/9/2024  9:10 AM [facility]   4/15/2024 11:30 AM Vicente, Zulma L., FNP-C [facility]   4/18/2024  1:30 [facility]       Follow up for 2 wk f/u Joint pain. Call sooner if needed.    CELY Kramer    Lab Frequency Next Occurrence   Ambulatory referral/consult to Physical/Occupational Therapy Once 03/15/2023   Mammo Digital Screening Bilat w/ Jayesh Once 03/27/2023   Ambulatory referral/consult to Dermatology Once 04/17/2023   Ambulatory referral/consult to General Surgery Once 05/22/2023

## 2024-03-07 NOTE — ASSESSMENT & PLAN NOTE
Well controlled.   Continue current medicaitons.   Low Sodium Diet (DASH Diet - Less than 2 grams of sodium per day).  Monitor blood pressure daily and log. Report consistent numbers greater than 140/90.  Maintain healthy weight with goal BMI <30. Exercise 30 minutes per day, 5 days per week.  Smoking cessation encouraged to aid in BP reduction.

## 2024-03-13 ENCOUNTER — PATIENT MESSAGE (OUTPATIENT)
Dept: FAMILY MEDICINE | Facility: CLINIC | Age: 54
End: 2024-03-13
Payer: MEDICAID

## 2024-04-03 ENCOUNTER — OFFICE VISIT (OUTPATIENT)
Dept: FAMILY MEDICINE | Facility: CLINIC | Age: 54
End: 2024-04-03
Payer: MEDICAID

## 2024-04-03 VITALS
HEART RATE: 85 BPM | OXYGEN SATURATION: 96 % | TEMPERATURE: 98 F | WEIGHT: 175 LBS | DIASTOLIC BLOOD PRESSURE: 72 MMHG | SYSTOLIC BLOOD PRESSURE: 130 MMHG | BODY MASS INDEX: 32.2 KG/M2 | HEIGHT: 62 IN

## 2024-04-03 DIAGNOSIS — Z00.01 ANNUAL VISIT FOR GENERAL ADULT MEDICAL EXAMINATION WITH ABNORMAL FINDINGS: Primary | ICD-10-CM

## 2024-04-03 DIAGNOSIS — R42 DIZZINESS: ICD-10-CM

## 2024-04-03 DIAGNOSIS — J01.90 ACUTE NON-RECURRENT SINUSITIS, UNSPECIFIED LOCATION: Primary | ICD-10-CM

## 2024-04-03 PROCEDURE — 3044F HG A1C LEVEL LT 7.0%: CPT | Mod: CPTII,,, | Performed by: NURSE PRACTITIONER

## 2024-04-03 PROCEDURE — 3061F NEG MICROALBUMINURIA REV: CPT | Mod: CPTII,,, | Performed by: NURSE PRACTITIONER

## 2024-04-03 PROCEDURE — 80061 LIPID PANEL: CPT | Performed by: NURSE PRACTITIONER

## 2024-04-03 PROCEDURE — 83036 HEMOGLOBIN GLYCOSYLATED A1C: CPT | Performed by: NURSE PRACTITIONER

## 2024-04-03 PROCEDURE — 85025 COMPLETE CBC W/AUTO DIFF WBC: CPT | Performed by: NURSE PRACTITIONER

## 2024-04-03 PROCEDURE — 3075F SYST BP GE 130 - 139MM HG: CPT | Mod: CPTII,,, | Performed by: NURSE PRACTITIONER

## 2024-04-03 PROCEDURE — 3078F DIAST BP <80 MM HG: CPT | Mod: CPTII,,, | Performed by: NURSE PRACTITIONER

## 2024-04-03 PROCEDURE — 84443 ASSAY THYROID STIM HORMONE: CPT | Performed by: NURSE PRACTITIONER

## 2024-04-03 PROCEDURE — 80053 COMPREHEN METABOLIC PANEL: CPT | Performed by: NURSE PRACTITIONER

## 2024-04-03 PROCEDURE — 3008F BODY MASS INDEX DOCD: CPT | Mod: CPTII,,, | Performed by: NURSE PRACTITIONER

## 2024-04-03 PROCEDURE — 1159F MED LIST DOCD IN RCRD: CPT | Mod: CPTII,,, | Performed by: NURSE PRACTITIONER

## 2024-04-03 PROCEDURE — 1160F RVW MEDS BY RX/DR IN RCRD: CPT | Mod: CPTII,,, | Performed by: NURSE PRACTITIONER

## 2024-04-03 PROCEDURE — 99213 OFFICE O/P EST LOW 20 MIN: CPT | Mod: ,,, | Performed by: NURSE PRACTITIONER

## 2024-04-03 PROCEDURE — 3066F NEPHROPATHY DOC TX: CPT | Mod: CPTII,,, | Performed by: NURSE PRACTITIONER

## 2024-04-03 PROCEDURE — 82043 UR ALBUMIN QUANTITATIVE: CPT | Performed by: NURSE PRACTITIONER

## 2024-04-03 NOTE — PROGRESS NOTES
"Patient ID: Dasha Curiel  : 1970    Chief Complaint: Dizziness    Allergies: Patient is allergic to iodine, meperidine, and promethazine.     History of Present Illness:  The patient is a 54 y.o. Black or  female who presents to clinic for follow up on Dizziness     Complains of dizziness x 2-3 days. She has intermittent episodes of "head spinning" over the last few days. Episodes occur quickly and resolve rapidly, has only occurred when she is laying down and she does not feel that it happens with positional change. She does have some mild sinus symptoms, no nasal congestion, no ear ringing or fullness. Denies fever. No palpitations.     Social History:  reports that she has never smoked. She has never been exposed to tobacco smoke. She has never used smokeless tobacco. She reports that she does not currently use alcohol. She reports that she does not currently use drugs after having used the following drugs: "Crack" cocaine.    Past Medical History:  has a past medical history of Anxiety, CHF (congestive heart failure), CKD (chronic kidney disease), Depression, Diabetes mellitus, type 2, DM (diabetes mellitus), Elevated antinuclear antibody (LLOYD) level, Gout, unspecified, Hypertension, Insomnia, Lumbar radiculopathy, right, and Neuropathy.    Current Medications:  Current Outpatient Medications   Medication Instructions    ADULT LOW DOSE ASPIRIN 81 mg, Oral, Nightly    ALPRAZolam (XANAX) 0.5 mg, Oral, 3 times daily PRN    amLODIPine (NORVASC) 10 mg, Oral, Daily    atorvastatin (LIPITOR) 20 mg, Oral, Daily    blood sugar diagnostic Strp To check BG 1 times daily, to use with insurance preferred meter    blood sugar diagnostic Strp To check BG one time daily, to use with insurance preferred meter    carvediloL (COREG) 12.5 mg, Oral, 2 times daily    cetirizine (ZYRTEC) 10 mg, Oral, Daily PRN    clobetasoL (TEMOVATE) 0.05 % cream Topical (Top), 2 times daily    cyclobenzaprine (FLEXERIL) " "5 mg, Oral, 3 times daily PRN    DULoxetine (CYMBALTA) 60 mg, Oral, Daily    fluticasone propionate (FLONASE) 50 mcg, Each Nostril, 2 times daily    gabapentin (NEURONTIN) 400 MG capsule TAKE 1 CAPSULE BY MOUTH EVERY MORNING  AND 2 AT BEDTIME    ibuprofen (ADVIL,MOTRIN) 800 MG tablet TAKE 1 TABLET BY MOUTH EVERY EIGHT HOURS    ibuprofen (ADVIL,MOTRIN) 800 mg, Oral, 3 times daily    levETIRAcetam (KEPPRA) 500 mg, Oral, 2 times daily    metFORMIN (GLUCOPHAGE-XR) 1,000 mg, Oral, Nightly    nitroGLYCERIN (NITROSTAT) 0.4 MG SL tablet 1 TABLET UNDER TONGUE EVERY  5 MINUTES NO MORE THAN 3 TABS THEN GO TO ER FOR CHEST PAIN    OZEMPIC 1 mg, Subcutaneous, Every 7 days    TRUE METRIX GLUCOSE METER Misc 1 each, Intradermal, Daily    TRUEPLUS LANCETS 33 gauge Misc 1 lancet , Topical (Top), Daily    zolpidem (AMBIEN) 10 mg, Oral, Nightly PRN       Review of Systems   See HPI    Visit Vitals  /72 (BP Location: Left arm, Patient Position: Sitting, BP Method: Medium (Manual))   Pulse 85   Temp 97.7 °F (36.5 °C) (Temporal)   Ht 5' 2.01" (1.575 m)   Wt 79.4 kg (175 lb)   SpO2 96%   BMI 32.00 kg/m²       Physical Exam  Constitutional:       Appearance: Normal appearance. She is obese.   HENT:      Left Ear: Tympanic membrane normal.      Ears:      Comments: Air fluid levels right TM     Nose: Rhinorrhea present.      Mouth/Throat:      Pharynx: Posterior oropharyngeal erythema present. No oropharyngeal exudate.   Eyes:      Extraocular Movements: Extraocular movements intact.      Conjunctiva/sclera: Conjunctivae normal.      Pupils: Pupils are equal, round, and reactive to light.   Cardiovascular:      Heart sounds: Normal heart sounds.   Pulmonary:      Breath sounds: Normal breath sounds. No wheezing or rales.   Skin:     General: Skin is warm and dry.   Neurological:      General: No focal deficit present.      Cranial Nerves: No cranial nerve deficit.      Motor: No weakness.      Gait: Gait normal.          Assessment & " Plan:  1. Acute non-recurrent sinusitis, unspecified location  Comments:  Resume Flonase and Zyrtec.   Call if symptoms do not resolve.    2. Dizziness         Future Appointments   Date Time Provider Department Center   4/15/2024 11:30 AM [facility]   4/18/2024  1:30 PM [facility]       Follow up if symptoms worsen or fail to improve. Call sooner if needed.    CELY Kramer    Lab Frequency Next Occurrence   Ambulatory referral/consult to Physical/Occupational Therapy Once 03/15/2023   Mammo Digital Screening Bilat w/ Jayesh Once 03/27/2023   Ambulatory referral/consult to Dermatology Once 04/17/2023   Ambulatory referral/consult to General Surgery Once 05/22/2023

## 2024-04-11 ENCOUNTER — TELEPHONE (OUTPATIENT)
Dept: FAMILY MEDICINE | Facility: CLINIC | Age: 54
End: 2024-04-11
Payer: MEDICAID

## 2024-04-18 ENCOUNTER — OFFICE VISIT (OUTPATIENT)
Dept: BEHAVIORAL HEALTH | Facility: CLINIC | Age: 54
End: 2024-04-18
Payer: MEDICAID

## 2024-04-18 VITALS
WEIGHT: 175.25 LBS | SYSTOLIC BLOOD PRESSURE: 132 MMHG | OXYGEN SATURATION: 95 % | HEIGHT: 62 IN | TEMPERATURE: 98 F | BODY MASS INDEX: 32.25 KG/M2 | HEART RATE: 87 BPM | DIASTOLIC BLOOD PRESSURE: 86 MMHG

## 2024-04-18 DIAGNOSIS — G47.00 INSOMNIA, UNSPECIFIED TYPE: ICD-10-CM

## 2024-04-18 DIAGNOSIS — F33.9 EPISODE OF RECURRENT MAJOR DEPRESSIVE DISORDER, UNSPECIFIED DEPRESSION EPISODE SEVERITY: Primary | ICD-10-CM

## 2024-04-18 DIAGNOSIS — F41.1 GENERALIZED ANXIETY DISORDER: ICD-10-CM

## 2024-04-18 PROCEDURE — 99214 OFFICE O/P EST MOD 30 MIN: CPT | Mod: ,,, | Performed by: NURSE PRACTITIONER

## 2024-04-18 PROCEDURE — 3044F HG A1C LEVEL LT 7.0%: CPT | Mod: CPTII,,, | Performed by: NURSE PRACTITIONER

## 2024-04-18 PROCEDURE — 3079F DIAST BP 80-89 MM HG: CPT | Mod: CPTII,,, | Performed by: NURSE PRACTITIONER

## 2024-04-18 PROCEDURE — 1159F MED LIST DOCD IN RCRD: CPT | Mod: CPTII,,, | Performed by: NURSE PRACTITIONER

## 2024-04-18 PROCEDURE — 3066F NEPHROPATHY DOC TX: CPT | Mod: CPTII,,, | Performed by: NURSE PRACTITIONER

## 2024-04-18 PROCEDURE — 1160F RVW MEDS BY RX/DR IN RCRD: CPT | Mod: CPTII,,, | Performed by: NURSE PRACTITIONER

## 2024-04-18 PROCEDURE — 3008F BODY MASS INDEX DOCD: CPT | Mod: CPTII,,, | Performed by: NURSE PRACTITIONER

## 2024-04-18 PROCEDURE — 3061F NEG MICROALBUMINURIA REV: CPT | Mod: CPTII,,, | Performed by: NURSE PRACTITIONER

## 2024-04-18 PROCEDURE — 3075F SYST BP GE 130 - 139MM HG: CPT | Mod: CPTII,,, | Performed by: NURSE PRACTITIONER

## 2024-04-18 RX ORDER — DULOXETIN HYDROCHLORIDE 60 MG/1
60 CAPSULE, DELAYED RELEASE ORAL DAILY
Qty: 90 CAPSULE | Refills: 1 | Status: SHIPPED | OUTPATIENT
Start: 2024-04-18 | End: 2024-06-18 | Stop reason: SDUPTHER

## 2024-04-18 RX ORDER — ALPRAZOLAM 0.5 MG/1
0.5 TABLET ORAL 3 TIMES DAILY PRN
Qty: 90 TABLET | Refills: 1 | Status: SHIPPED | OUTPATIENT
Start: 2024-04-18 | End: 2024-06-18 | Stop reason: SDUPTHER

## 2024-04-18 RX ORDER — ZOLPIDEM TARTRATE 10 MG/1
10 TABLET ORAL NIGHTLY PRN
Qty: 30 TABLET | Refills: 1 | Status: SHIPPED | OUTPATIENT
Start: 2024-04-18 | End: 2024-06-18 | Stop reason: SDUPTHER

## 2024-04-18 NOTE — PROGRESS NOTES
"PSYCHIATRIC FOLLOW-UP VISIT NOTE    Chief Complaint   Patient presents with    Depression     2 month medication management         History of Present Illness  54 y.o. year old Black or  female with hx of MDD, CATARINO, and insomnia seen today for follow-up appointment and medication management.  Patient reports that she has been doing very well since last visit.  Denies any recent depression.  Anxiety has also well-controlled with current medication regimen.  Has not been isolative or withdrawn.  Still with continued weight loss secondary to Ozempic.  Sleeping well at night and feels rested in the mornings.  Denies any recent anhedonia or low motivation.  Recently took a trip to [place], [place] with her family and reports enjoying her vacation very much.  She denies any side effects from current medication regimen. Patient denies SI/HI. Denies hallucinations and does not appear to be responding to internal stimuli or be internally preoccupied. No manic symptoms noted.       Objective:     Vitals:  Vitals:    04/18/24 1336   BP: 132/86   BP Location: Right arm   Patient Position: Sitting   BP Method: Large (Manual)   Pulse: 87   Temp: 97.9 °F (36.6 °C)   TempSrc: Temporal   SpO2: 95%   Weight: 79.5 kg (175 lb 4.3 oz)   Height: 5' 2.01" (1.575 m)       Wt Readings from Last 3 Encounters:   04/18/24 1336 79.5 kg (175 lb 4.3 oz)   04/03/24 0953 79.4 kg (175 lb)   03/07/24 1400 79.5 kg (175 lb 3.2 oz)         Medication:    Current Outpatient Medications:     ADULT LOW DOSE ASPIRIN 81 mg EC tablet, Take 81 mg by mouth nightly., Disp: , Rfl:     amLODIPine (NORVASC) 10 MG tablet, Take 1 tablet (10 mg total) by mouth once daily., Disp: 90 tablet, Rfl: 3    atorvastatin (LIPITOR) 20 MG tablet, Take 1 tablet (20 mg total) by mouth once daily., Disp: 90 tablet, Rfl: 3    blood sugar diagnostic Strp, To check BG 1 times daily, to use with insurance preferred meter, Disp: 30 each, Rfl: 0    blood sugar diagnostic " Strp, To check BG one time daily, to use with insurance preferred meter, Disp: 35 each, Rfl: 11    carvediloL (COREG) 12.5 MG tablet, Take 1 tablet (12.5 mg total) by mouth 2 (two) times daily., Disp: 60 tablet, Rfl: 11    cetirizine (ZYRTEC) 10 MG tablet, Take 10 mg by mouth daily as needed., Disp: , Rfl:     clobetasoL (TEMOVATE) 0.05 % cream, Apply topically 2 (two) times daily., Disp: 60 g, Rfl: 11    cyclobenzaprine (FLEXERIL) 5 MG tablet, Take 1 tablet (5 mg total) by mouth 3 (three) times daily as needed for Muscle spasms., Disp: 30 tablet, Rfl: 2    fluticasone propionate (FLONASE) 50 mcg/actuation nasal spray, 1 spray (50 mcg total) by Each Nostril route 2 (two) times daily. (Patient taking differently: 1 spray by Each Nostril route as needed.), Disp: 11.1 mL, Rfl: 5    gabapentin (NEURONTIN) 400 MG capsule, TAKE 1 CAPSULE BY MOUTH EVERY MORNING  AND 2 AT BEDTIME, Disp: 270 capsule, Rfl: 3    ibuprofen (ADVIL,MOTRIN) 800 MG tablet, TAKE 1 TABLET BY MOUTH EVERY EIGHT HOURS, Disp: 30 tablet, Rfl: 5    levETIRAcetam (KEPPRA) 500 MG Tab, Take 1 tablet (500 mg total) by mouth 2 (two) times daily., Disp: 180 tablet, Rfl: 3    metFORMIN (GLUCOPHAGE-XR) 500 MG ER 24hr tablet, TAKE 2 TABLETS BY MOUTH NIGHTLY, Disp: 60 tablet, Rfl: 5    nitroGLYCERIN (NITROSTAT) 0.4 MG SL tablet, 1 TABLET UNDER TONGUE EVERY  5 MINUTES NO MORE THAN 3 TABS THEN GO TO ER FOR CHEST PAIN, Disp: 25 tablet, Rfl: 2    semaglutide (OZEMPIC) 1 mg/dose (4 mg/3 mL), Inject 1 mg into the skin every 7 days., Disp: 3 mL, Rfl: 11    TRUE METRIX GLUCOSE METER Misc, Inject 1 each into the skin once daily., Disp: , Rfl:     TRUEPLUS LANCETS 33 gauge Misc, Apply 1 lancet  topically once daily., Disp: , Rfl:     ALPRAZolam (XANAX) 0.5 MG tablet, Take 1 tablet (0.5 mg total) by mouth 3 (three) times daily as needed for Anxiety., Disp: 90 tablet, Rfl: 1    DULoxetine (CYMBALTA) 60 MG capsule, Take 1 capsule (60 mg total) by mouth Daily., Disp: 90 capsule,  "Rfl: 1    ibuprofen (ADVIL,MOTRIN) 800 MG tablet, Take 800 mg by mouth 3 (three) times daily. (Patient not taking: Reported on 4/18/2024), Disp: , Rfl:     zolpidem (AMBIEN) 10 mg Tab, Take 1 tablet (10 mg total) by mouth nightly as needed (insomnia)., Disp: 30 tablet, Rfl: 1       Significant Labs: - none at this time    Significant Imaging: - none at this time    Physical Exam  Vitals and nursing note reviewed.   Constitutional:       General: She is awake.      Appearance: Normal appearance.   Musculoskeletal:      Comments: Full ROM   Neurological:      Mental Status: She is alert.   Psychiatric:         Attention and Perception: Attention and perception normal. She does not perceive auditory or visual hallucinations.         Mood and Affect: Affect normal.         Speech: Speech normal.         Behavior: Behavior is cooperative.         Thought Content: Thought content does not include homicidal or suicidal ideation.         Cognition and Memory: Cognition and memory normal.          Review of Systems     Mental Status Exam:  Presentation:  - Appearance: 54 y.o. year old Black or  female, appears stated age, appears Casually dressed and Well groomed  - Motility: Erect when standing, Steady gait, No EPS or Tremors, No psychomotor agitation or retardation appreciated  - Behavior: calm, cooperative, good eye contact  Speech:  - Character/Organization: spontaneous, fluent, normal volume, normal rate, normal rhythm  Emotional State:  - Mood: "happy"   - Affect: congruent and appropriate  Thought:  - Process: logical, linear, organized , goal-directed  - Preoccupations: no ruminations, rituals, or phobias appreciated  - Delusions: no persecutory, paranoid, or grandiose delusions appreciated  - Perception: denies AVH, not actively responding to internal stimuli  - SI/HI: denies/denies  Sensorium & Intellect:  - Sensorium: AAOx4  - Memory: intact to recent and remote events  - Attention/Concentration: " good/good  - Insight/Judgement: good/good    Gait: normal swing and stance  MSK:no rigidity appreciated    All other systems without acute issues unless noted in HPI      Assessment/Plan      ICD-10-CM ICD-9-CM    1. Episode of recurrent major depressive disorder, unspecified depression episode severity  F33.9 296.30       2. Generalized anxiety disorder  F41.1 300.02 ALPRAZolam (XANAX) 0.5 MG tablet      3. Insomnia, unspecified type  G47.00 780.52 zolpidem (AMBIEN) 10 mg Tab         Continue current medications without change    Potential side effects and risks vs benefits of current treatment plan reviewed with patient. Applicable black box warnings reviewed. Encouraged patient not to alter dosages or abruptly discontinue medications without contacting prescriber first, due to risk of worsening symptoms and decompensation of mental status. Warned of risks associated with herbal remedies and supplements while taking psychotropic medications and of the need to consult prescriber prior to adding any of these to current regimen. Patient should abstain from abuse of alcohol, prescription medications, and illicit drugs. Reviewed when to contact clinic and/or seek emergent care, such as but not limited to, onset/worsening SI/HI, hallucinations, delusions, manic symptoms. Pt verbalized understanding and agreement of these warnings/recommendations and verbally consented to treatment plan.     checked. Results as expected. No suspected diversion or abuse of controlled substances.      Follow up in about 2 months (around 6/18/2024) for Medication Management.    Brendan Case, [facility]

## 2024-04-19 DIAGNOSIS — M54.16 LUMBAR RADICULOPATHY: ICD-10-CM

## 2024-04-19 RX ORDER — IBUPROFEN 800 MG/1
TABLET ORAL
Qty: 30 TABLET | Refills: 5 | Status: SHIPPED | OUTPATIENT
Start: 2024-04-19

## 2024-04-25 ENCOUNTER — PATIENT MESSAGE (OUTPATIENT)
Dept: FAMILY MEDICINE | Facility: CLINIC | Age: 54
End: 2024-04-25

## 2024-04-25 ENCOUNTER — TELEPHONE (OUTPATIENT)
Dept: FAMILY MEDICINE | Facility: CLINIC | Age: 54
End: 2024-04-25

## 2024-04-25 DIAGNOSIS — E11.9 TYPE 2 DIABETES MELLITUS WITHOUT COMPLICATION, WITHOUT LONG-TERM CURRENT USE OF INSULIN: Primary | ICD-10-CM

## 2024-04-25 DIAGNOSIS — Z12.11 SCREENING FOR COLON CANCER: ICD-10-CM

## 2024-04-25 NOTE — TELEPHONE ENCOUNTER
Pt states that she would like the referral sent to a different surgeon d/t Dr. Ovalle doing his appt's too early in the morning. She also said that [facility] would [facility]

## 2024-06-05 DIAGNOSIS — E11.9 TYPE 2 DIABETES MELLITUS WITHOUT COMPLICATION, WITHOUT LONG-TERM CURRENT USE OF INSULIN: Primary | ICD-10-CM

## 2024-06-18 ENCOUNTER — OFFICE VISIT (OUTPATIENT)
Dept: BEHAVIORAL HEALTH | Facility: CLINIC | Age: 54
End: 2024-06-18
Payer: MEDICAID

## 2024-06-18 VITALS
OXYGEN SATURATION: 97 % | WEIGHT: 167.31 LBS | SYSTOLIC BLOOD PRESSURE: 126 MMHG | HEIGHT: 62 IN | DIASTOLIC BLOOD PRESSURE: 78 MMHG | BODY MASS INDEX: 30.79 KG/M2 | TEMPERATURE: 98 F | HEART RATE: 86 BPM

## 2024-06-18 DIAGNOSIS — G47.00 INSOMNIA, UNSPECIFIED TYPE: ICD-10-CM

## 2024-06-18 DIAGNOSIS — F33.9 EPISODE OF RECURRENT MAJOR DEPRESSIVE DISORDER, UNSPECIFIED DEPRESSION EPISODE SEVERITY: Primary | ICD-10-CM

## 2024-06-18 DIAGNOSIS — F41.1 GENERALIZED ANXIETY DISORDER: ICD-10-CM

## 2024-06-18 PROCEDURE — 1159F MED LIST DOCD IN RCRD: CPT | Mod: CPTII,,, | Performed by: NURSE PRACTITIONER

## 2024-06-18 PROCEDURE — 3044F HG A1C LEVEL LT 7.0%: CPT | Mod: CPTII,,, | Performed by: NURSE PRACTITIONER

## 2024-06-18 PROCEDURE — 99214 OFFICE O/P EST MOD 30 MIN: CPT | Mod: ,,, | Performed by: NURSE PRACTITIONER

## 2024-06-18 PROCEDURE — 3008F BODY MASS INDEX DOCD: CPT | Mod: CPTII,,, | Performed by: NURSE PRACTITIONER

## 2024-06-18 PROCEDURE — 3078F DIAST BP <80 MM HG: CPT | Mod: CPTII,,, | Performed by: NURSE PRACTITIONER

## 2024-06-18 PROCEDURE — 3066F NEPHROPATHY DOC TX: CPT | Mod: CPTII,,, | Performed by: NURSE PRACTITIONER

## 2024-06-18 PROCEDURE — 3074F SYST BP LT 130 MM HG: CPT | Mod: CPTII,,, | Performed by: NURSE PRACTITIONER

## 2024-06-18 PROCEDURE — 3061F NEG MICROALBUMINURIA REV: CPT | Mod: CPTII,,, | Performed by: NURSE PRACTITIONER

## 2024-06-18 PROCEDURE — 1160F RVW MEDS BY RX/DR IN RCRD: CPT | Mod: CPTII,,, | Performed by: NURSE PRACTITIONER

## 2024-06-18 RX ORDER — DULOXETIN HYDROCHLORIDE 60 MG/1
60 CAPSULE, DELAYED RELEASE ORAL DAILY
Qty: 90 CAPSULE | Refills: 1 | Status: SHIPPED | OUTPATIENT
Start: 2024-06-18

## 2024-06-18 RX ORDER — ALPRAZOLAM 0.5 MG/1
0.5 TABLET ORAL 3 TIMES DAILY PRN
Qty: 90 TABLET | Refills: 1 | Status: SHIPPED | OUTPATIENT
Start: 2024-06-18

## 2024-06-18 RX ORDER — ZOLPIDEM TARTRATE 10 MG/1
10 TABLET ORAL NIGHTLY PRN
Qty: 30 TABLET | Refills: 1 | Status: SHIPPED | OUTPATIENT
Start: 2024-06-18

## 2024-06-18 NOTE — PROGRESS NOTES
"PSYCHIATRIC FOLLOW-UP VISIT NOTE    Chief Complaint   Patient presents with    Medication Management     2 month medication management         History of Present Illness  54 y.o. year old Black or  female with hx of MDD, CATARINO, and insomnia seen today for follow-up appointment and medication management.  Patient reports that she has been doing well for the most part since last visit.  Does report some mild depression and anxiety.  This is directly related to relationship with her daughter.  Her daughter is 21 years old and still resides with her.  Patient states daughter does not contribute to bills or help with housework and often request about money from her and does not pay it back.  Patient feels that she has been taken advantage of, but it was having trouble setting boundaries with her daughter.  States daughter also approaches other relatives about borrowing money despite having no bills of her own.  Day-to-day patient was coping with this effectively.  She was still sleeping well at night and feels rested in the morning.  Denies any side effects from current medication regimen. Patient denies SI/HI. Denies hallucinations and does not appear to be responding to internal stimuli or be internally preoccupied. No manic symptoms noted.       Objective:     Vitals:  Vitals:    06/18/24 0959   BP: 126/78   BP Location: Right arm   Patient Position: Sitting   BP Method: Large (Manual)   Pulse: 86   Temp: 97.7 °F (36.5 °C)   TempSrc: Temporal   SpO2: 97%   Weight: 75.9 kg (167 lb 5.3 oz)   Height: 5' 2.01" (1.575 m)       Wt Readings from Last 3 Encounters:   06/18/24 0959 75.9 kg (167 lb 5.3 oz)   04/18/24 1336 79.5 kg (175 lb 4.3 oz)   04/03/24 0953 79.4 kg (175 lb)         Medication:    Current Outpatient Medications:     ADULT LOW DOSE ASPIRIN 81 mg EC tablet, Take 81 mg by mouth nightly., Disp: , Rfl:     amLODIPine (NORVASC) 10 MG tablet, Take 1 tablet (10 mg total) by mouth once daily., Disp: 90 " tablet, Rfl: 3    atorvastatin (LIPITOR) 20 MG tablet, Take 1 tablet (20 mg total) by mouth once daily., Disp: 90 tablet, Rfl: 3    blood sugar diagnostic Strp, To check BG 1 times daily, to use with insurance preferred meter, Disp: 30 each, Rfl: 0    blood sugar diagnostic Strp, To check BG one time daily, to use with insurance preferred meter, Disp: 35 each, Rfl: 11    carvediloL (COREG) 12.5 MG tablet, Take 1 tablet (12.5 mg total) by mouth 2 (two) times daily., Disp: 60 tablet, Rfl: 11    cetirizine (ZYRTEC) 10 MG tablet, Take 10 mg by mouth daily as needed., Disp: , Rfl:     clobetasoL (TEMOVATE) 0.05 % cream, Apply topically 2 (two) times daily., Disp: 60 g, Rfl: 11    cyclobenzaprine (FLEXERIL) 5 MG tablet, Take 1 tablet (5 mg total) by mouth 3 (three) times daily as needed for Muscle spasms., Disp: 30 tablet, Rfl: 2    fluticasone propionate (FLONASE) 50 mcg/actuation nasal spray, 1 spray (50 mcg total) by Each Nostril route 2 (two) times daily. (Patient taking differently: 1 spray by Each Nostril route as needed.), Disp: 11.1 mL, Rfl: 5    gabapentin (NEURONTIN) 400 MG capsule, TAKE 1 CAPSULE BY MOUTH EVERY MORNING  AND 2 AT BEDTIME, Disp: 270 capsule, Rfl: 3    ibuprofen (ADVIL,MOTRIN) 800 MG tablet, TAKE 1 TABLET BY MOUTH EVERY EIGHT HOURS, Disp: 30 tablet, Rfl: 5    levETIRAcetam (KEPPRA) 500 MG Tab, Take 1 tablet (500 mg total) by mouth 2 (two) times daily., Disp: 180 tablet, Rfl: 3    metFORMIN (GLUCOPHAGE-XR) 500 MG ER 24hr tablet, TAKE 2 TABLETS BY MOUTH NIGHTLY, Disp: 60 tablet, Rfl: 5    nitroGLYCERIN (NITROSTAT) 0.4 MG SL tablet, 1 TABLET UNDER TONGUE EVERY  5 MINUTES NO MORE THAN 3 TABS THEN GO TO ER FOR CHEST PAIN, Disp: 25 tablet, Rfl: 2    semaglutide (OZEMPIC) 1 mg/dose (4 mg/3 mL), Inject 1 mg into the skin every 7 days., Disp: 3 mL, Rfl: 11    TRUE METRIX GLUCOSE METER Misc, Inject 1 each into the skin once daily., Disp: , Rfl:     TRUEPLUS LANCETS 33 gauge Misc, Apply 1 lancet  topically  "once daily., Disp: , Rfl:     ALPRAZolam (XANAX) 0.5 MG tablet, Take 1 tablet (0.5 mg total) by mouth 3 (three) times daily as needed for Anxiety., Disp: 90 tablet, Rfl: 1    DULoxetine (CYMBALTA) 60 MG capsule, Take 1 capsule (60 mg total) by mouth Daily., Disp: 90 capsule, Rfl: 1    ibuprofen (ADVIL,MOTRIN) 800 MG tablet, Take 800 mg by mouth 3 (three) times daily. (Patient not taking: Reported on 4/18/2024), Disp: , Rfl:     zolpidem (AMBIEN) 10 mg Tab, Take 1 tablet (10 mg total) by mouth nightly as needed (insomnia)., Disp: 30 tablet, Rfl: 1       Significant Labs: - none at this time    Significant Imaging: - none at this time    Physical Exam  Vitals and nursing note reviewed.   Constitutional:       General: She is awake.      Appearance: Normal appearance.   Musculoskeletal:      Comments: Full ROM   Neurological:      Mental Status: She is alert.   Psychiatric:         Attention and Perception: Attention and perception normal. She does not perceive auditory or visual hallucinations.         Mood and Affect: Affect normal. Mood is anxious and depressed.         Speech: Speech normal.         Behavior: Behavior is cooperative.         Thought Content: Thought content does not include homicidal or suicidal ideation.         Cognition and Memory: Cognition and memory normal.          Review of Systems     Mental Status Exam:  Presentation:  - Appearance: 54 y.o. year old Black or  female, appears stated age, appears Casually dressed and Well groomed  - Motility: Erect when standing, Steady gait, No EPS or Tremors, No psychomotor agitation or retardation appreciated  - Behavior: cooperative, maintains eye contact, sullen  Speech:  - Character/Organization: spontaneous, fluent, normal volume, normal rate, normal rhythm  Emotional State:  - Mood: "mild depression and anxiety"   - Affect: congruent and depressed  Thought:  - Process: logical, linear, organized , goal-directed  - Preoccupations: " guilt, family  - Delusions: no persecutory, paranoid, or grandiose delusions appreciated  - Perception: denies AVH, not actively responding to internal stimuli  - SI/HI: denies/denies  Sensorium & Intellect:  - Sensorium: AAOx4  - Memory: intact to recent and remote events  - Attention/Concentration: good/good  - Insight/Judgement: good/good    Gait: normal swing and stance  MSK:no rigidity appreciated    All other systems without acute issues unless noted in HPI      Assessment/Plan      ICD-10-CM ICD-9-CM    1. Episode of recurrent major depressive disorder, unspecified depression episode severity  F33.9 296.30 DULoxetine (CYMBALTA) 60 MG capsule      2. Generalized anxiety disorder  F41.1 300.02 ALPRAZolam (XANAX) 0.5 MG tablet      DULoxetine (CYMBALTA) 60 MG capsule      3. Insomnia, unspecified type  G47.00 780.52 zolpidem (AMBIEN) 10 mg Tab         Continue current medications without change     checked. Results as expected. No suspected diversion or abuse of controlled substances.    Potential side effects and risks vs benefits of current treatment plan reviewed with patient. Applicable black box warnings reviewed. Encouraged patient not to alter dosages or abruptly discontinue medications without contacting prescriber first, due to risk of worsening symptoms and decompensation of mental status. Warned of risks associated with herbal remedies and supplements while taking psychotropic medications and of the need to consult prescriber prior to adding any of these to current regimen. Patient should abstain from abuse of alcohol, prescription medications, and illicit drugs. Reviewed when to contact clinic and/or seek emergent care, such as but not limited to, onset/worsening SI/HI, hallucinations, delusions, manic symptoms. Pt verbalized understanding and agreement of these warnings/recommendations and verbally consented to treatment plan.      Follow up in about 2 months (around 8/18/2024) for Medication  Management.    Brendan Case, [facility]

## 2024-07-15 DIAGNOSIS — E11.9 TYPE 2 DIABETES MELLITUS WITHOUT COMPLICATION, WITHOUT LONG-TERM CURRENT USE OF INSULIN: ICD-10-CM

## 2024-07-15 RX ORDER — METFORMIN HYDROCHLORIDE 500 MG/1
1000 TABLET, EXTENDED RELEASE ORAL NIGHTLY
Qty: 60 TABLET | Refills: 1 | Status: SHIPPED | OUTPATIENT
Start: 2024-07-15

## 2024-07-23 DIAGNOSIS — M54.16 LUMBAR RADICULOPATHY: ICD-10-CM

## 2024-07-23 RX ORDER — NITROGLYCERIN 0.4 MG/1
TABLET SUBLINGUAL
Qty: 25 TABLET | Refills: 2 | Status: SHIPPED | OUTPATIENT
Start: 2024-07-23

## 2024-07-31 DIAGNOSIS — B37.2 CANDIDIASIS OF SKIN: ICD-10-CM

## 2024-07-31 RX ORDER — NYSTATIN 100000 U/G
OINTMENT TOPICAL
Qty: 30 G | Refills: 2 | Status: SHIPPED | OUTPATIENT
Start: 2024-07-31

## 2024-08-13 ENCOUNTER — TELEPHONE (OUTPATIENT)
Dept: FAMILY MEDICINE | Facility: CLINIC | Age: 54
End: 2024-08-13
Payer: MEDICAID

## 2024-08-13 ENCOUNTER — TELEPHONE (OUTPATIENT)
Dept: BEHAVIORAL HEALTH | Facility: CLINIC | Age: 54
End: 2024-08-13
Payer: MEDICAID

## 2024-08-13 DIAGNOSIS — F41.1 GENERALIZED ANXIETY DISORDER: ICD-10-CM

## 2024-08-13 DIAGNOSIS — G47.00 INSOMNIA, UNSPECIFIED TYPE: ICD-10-CM

## 2024-08-13 RX ORDER — ZOLPIDEM TARTRATE 10 MG/1
10 TABLET ORAL NIGHTLY PRN
Qty: 30 TABLET | Refills: 0 | Status: SHIPPED | OUTPATIENT
Start: 2024-08-13

## 2024-08-13 RX ORDER — ALPRAZOLAM 0.5 MG/1
0.5 TABLET ORAL 3 TIMES DAILY PRN
Qty: 90 TABLET | Refills: 1 | Status: SHIPPED | OUTPATIENT
Start: 2024-08-13

## 2024-08-19 ENCOUNTER — TELEPHONE (OUTPATIENT)
Dept: FAMILY MEDICINE | Facility: CLINIC | Age: 54
End: 2024-08-19
Payer: MEDICAID

## 2024-08-29 ENCOUNTER — OFFICE VISIT (OUTPATIENT)
Dept: BEHAVIORAL HEALTH | Facility: CLINIC | Age: 54
End: 2024-08-29
Payer: MEDICAID

## 2024-08-29 VITALS
WEIGHT: 161 LBS | SYSTOLIC BLOOD PRESSURE: 132 MMHG | OXYGEN SATURATION: 98 % | DIASTOLIC BLOOD PRESSURE: 72 MMHG | TEMPERATURE: 97 F | HEIGHT: 62 IN | BODY MASS INDEX: 29.63 KG/M2 | HEART RATE: 82 BPM

## 2024-08-29 DIAGNOSIS — G47.00 INSOMNIA, UNSPECIFIED TYPE: ICD-10-CM

## 2024-08-29 DIAGNOSIS — F41.1 GENERALIZED ANXIETY DISORDER: ICD-10-CM

## 2024-08-29 DIAGNOSIS — F33.9 EPISODE OF RECURRENT MAJOR DEPRESSIVE DISORDER, UNSPECIFIED DEPRESSION EPISODE SEVERITY: Primary | ICD-10-CM

## 2024-08-29 PROCEDURE — 3075F SYST BP GE 130 - 139MM HG: CPT | Mod: CPTII,,, | Performed by: NURSE PRACTITIONER

## 2024-08-29 PROCEDURE — 1160F RVW MEDS BY RX/DR IN RCRD: CPT | Mod: CPTII,,, | Performed by: NURSE PRACTITIONER

## 2024-08-29 PROCEDURE — 3061F NEG MICROALBUMINURIA REV: CPT | Mod: CPTII,,, | Performed by: NURSE PRACTITIONER

## 2024-08-29 PROCEDURE — 99214 OFFICE O/P EST MOD 30 MIN: CPT | Mod: ,,, | Performed by: NURSE PRACTITIONER

## 2024-08-29 PROCEDURE — 3008F BODY MASS INDEX DOCD: CPT | Mod: CPTII,,, | Performed by: NURSE PRACTITIONER

## 2024-08-29 PROCEDURE — 1159F MED LIST DOCD IN RCRD: CPT | Mod: CPTII,,, | Performed by: NURSE PRACTITIONER

## 2024-08-29 PROCEDURE — 3044F HG A1C LEVEL LT 7.0%: CPT | Mod: CPTII,,, | Performed by: NURSE PRACTITIONER

## 2024-08-29 PROCEDURE — 3078F DIAST BP <80 MM HG: CPT | Mod: CPTII,,, | Performed by: NURSE PRACTITIONER

## 2024-08-29 PROCEDURE — 3066F NEPHROPATHY DOC TX: CPT | Mod: CPTII,,, | Performed by: NURSE PRACTITIONER

## 2024-08-29 RX ORDER — ZOLPIDEM TARTRATE 10 MG/1
10 TABLET ORAL NIGHTLY PRN
Qty: 30 TABLET | Refills: 0 | Status: SHIPPED | OUTPATIENT
Start: 2024-08-29

## 2024-08-29 RX ORDER — DULOXETIN HYDROCHLORIDE 30 MG/1
90 CAPSULE, DELAYED RELEASE ORAL DAILY
Qty: 90 CAPSULE | Refills: 0 | Status: SHIPPED | OUTPATIENT
Start: 2024-08-29

## 2024-08-29 NOTE — PROGRESS NOTES
"PSYCHIATRIC FOLLOW-UP VISIT NOTE    Chief Complaint   Patient presents with    Depression         History of Present Illness  54 y.o. year old Black or  female with hx of MDD, CATARINO, and insomnia seen today for follow-up appointment and medication management.  Patient reports higher levels of depression since last visit.  Still with some discord between herself and her daughter but she states this is getting better.  Patient was tended to be isolative recently.  States she was having more bad days than good days.  Adamantly denies any thoughts of harming self or others.  Requiring p.r.n. Xanax every other day and does not require a refill today.  She is sleeping well at night and feels rested in the morning.  We agreed to increase her dosage of Cymbalta and re-evaluate efficacy in 4 weeks.  Patient verbalized understanding and agreement. Patient denies SI/HI. Denies hallucinations and does not appear to be responding to internal stimuli or be internally preoccupied. No manic symptoms noted.       Objective:     Vitals:  Vitals:    08/29/24 0920   BP: 132/72   BP Location: Right arm   Pulse: 82   Temp: 97 °F (36.1 °C)   TempSrc: Temporal   SpO2: 98%   Weight: 73 kg (161 lb)   Height: 5' 2" (1.575 m)       Wt Readings from Last 3 Encounters:   08/29/24 0920 73 kg (161 lb)   06/18/24 0959 75.9 kg (167 lb 5.3 oz)   04/18/24 1336 79.5 kg (175 lb 4.3 oz)         Medication:    Current Outpatient Medications:     ADULT LOW DOSE ASPIRIN 81 mg EC tablet, Take 81 mg by mouth nightly., Disp: , Rfl:     ALPRAZolam (XANAX) 0.5 MG tablet, Take 1 tablet (0.5 mg total) by mouth 3 (three) times daily as needed for Anxiety., Disp: 90 tablet, Rfl: 1    amLODIPine (NORVASC) 10 MG tablet, Take 1 tablet (10 mg total) by mouth once daily., Disp: 90 tablet, Rfl: 3    atorvastatin (LIPITOR) 20 MG tablet, Take 1 tablet (20 mg total) by mouth once daily., Disp: 90 tablet, Rfl: 3    blood sugar diagnostic Strp, To check BG 1 " times daily, to use with insurance preferred meter, Disp: 30 each, Rfl: 0    blood sugar diagnostic Strp, To check BG one time daily, to use with insurance preferred meter, Disp: 35 each, Rfl: 11    carvediloL (COREG) 12.5 MG tablet, Take 1 tablet (12.5 mg total) by mouth 2 (two) times daily., Disp: 60 tablet, Rfl: 11    cetirizine (ZYRTEC) 10 MG tablet, Take 10 mg by mouth daily as needed., Disp: , Rfl:     clobetasoL (TEMOVATE) 0.05 % cream, Apply topically 2 (two) times daily., Disp: 60 g, Rfl: 11    cyclobenzaprine (FLEXERIL) 5 MG tablet, Take 1 tablet (5 mg total) by mouth 3 (three) times daily as needed for Muscle spasms., Disp: 30 tablet, Rfl: 2    fluticasone propionate (FLONASE) 50 mcg/actuation nasal spray, 1 spray (50 mcg total) by Each Nostril route 2 (two) times daily. (Patient taking differently: 1 spray by Each Nostril route as needed.), Disp: 11.1 mL, Rfl: 5    gabapentin (NEURONTIN) 400 MG capsule, TAKE 1 CAPSULE BY MOUTH EVERY MORNING  AND 2 AT BEDTIME, Disp: 270 capsule, Rfl: 3    ibuprofen (ADVIL,MOTRIN) 800 MG tablet, TAKE 1 TABLET BY MOUTH EVERY EIGHT HOURS, Disp: 30 tablet, Rfl: 5    levETIRAcetam (KEPPRA) 500 MG Tab, Take 1 tablet (500 mg total) by mouth 2 (two) times daily., Disp: 180 tablet, Rfl: 3    metFORMIN (GLUCOPHAGE-XR) 500 MG ER 24hr tablet, TAKE 2 TABLETS BY MOUTH NIGHTLY, Disp: 60 tablet, Rfl: 1    nitroGLYCERIN (NITROSTAT) 0.4 MG SL tablet, 1 TABLET UNDER TONGUE EVERY  5 MINUTES NO MORE THAN 3 TABS THEN GO TO ER FOR CHEST PAIN, Disp: 25 tablet, Rfl: 2    nystatin (MYCOSTATIN) ointment, APPLY TO AFFECTED AREA TWO TIMES A DAY, Disp: 30 g, Rfl: 2    semaglutide (OZEMPIC) 1 mg/dose (4 mg/3 mL), Inject 1 mg into the skin every 7 days., Disp: 3 mL, Rfl: 11    TRUE METRIX GLUCOSE METER Misc, Inject 1 each into the skin once daily., Disp: , Rfl:     TRUEPLUS LANCETS 33 gauge Misc, Apply 1 lancet  topically once daily., Disp: , Rfl:     DULoxetine (CYMBALTA) 30 MG capsule, Take 3 capsules  "(90 mg total) by mouth Daily., Disp: 90 capsule, Rfl: 0    zolpidem (AMBIEN) 10 mg Tab, Take 1 tablet (10 mg total) by mouth nightly as needed (insomnia)., Disp: 30 tablet, Rfl: 0       Significant Labs: - none at this time    Significant Imaging: - none at this time    Physical Exam  Vitals and nursing note reviewed.   Constitutional:       General: She is awake.      Appearance: Normal appearance.   Musculoskeletal:      Comments: Full ROM   Neurological:      Mental Status: She is alert.   Psychiatric:         Attention and Perception: Attention and perception normal. She does not perceive auditory or visual hallucinations.         Mood and Affect: Affect normal. Mood is anxious and depressed.         Speech: Speech normal.         Behavior: Behavior is withdrawn. Behavior is cooperative.         Thought Content: Thought content does not include homicidal or suicidal ideation.         Cognition and Memory: Cognition and memory normal.         Judgment: Judgment normal.          Review of Systems     Mental Status Exam:  Presentation:  - Appearance: 54 y.o. year old Black or  female, appears stated age, appears Casually dressed and Well groomed  - Motility: Erect when standing, Steady gait, No EPS or Tremors, No psychomotor agitation or retardation appreciated  - Behavior: anxious, cooperative, maintains eye contact, sullen  Speech:  - Character/Organization: spontaneous, fluent, normal volume, normal rate, normal rhythm  Emotional State:  - Mood: "depressed"   - Affect: congruent and depressed  Thought:  - Process: logical, linear, organized , goal-directed  - Preoccupations: family  - Delusions: no persecutory, paranoid, or grandiose delusions appreciated  - Perception: denies AVH, not actively responding to internal stimuli  - SI/HI: denies/denies  Sensorium & Intellect:  - Sensorium: AAOx4  - Memory: intact to recent and remote events  - Attention/Concentration: good/good  - Insight/Judgement: " good/good    Gait: normal swing and stance  MSK:no rigidity appreciated    All other systems without acute issues unless noted in HPI      Assessment/Plan      ICD-10-CM ICD-9-CM    1. Episode of recurrent major depressive disorder, unspecified depression episode severity  F33.9 296.30 DULoxetine (CYMBALTA) 30 MG capsule      2. Generalized anxiety disorder  F41.1 300.02 DULoxetine (CYMBALTA) 30 MG capsule      3. Insomnia, unspecified type  G47.00 780.52 zolpidem (AMBIEN) 10 mg Tab         Increase Cymbalta to 90 mg daily.    Continue other medications without change    Potential side effects and risks vs benefits of current treatment plan reviewed with patient. Applicable black box warnings reviewed. Encouraged patient not to alter dosages or abruptly discontinue medications without contacting prescriber first, due to risk of worsening symptoms and decompensation of mental status. Warned of risks associated with herbal remedies and supplements while taking psychotropic medications and of the need to consult prescriber prior to adding any of these to current regimen. Patient should abstain from abuse of alcohol, prescription medications, and illicit drugs. Reviewed when to contact clinic and/or seek emergent care, such as but not limited to, onset/worsening SI/HI, hallucinations, delusions, manic symptoms. Pt verbalized understanding and agreement of these warnings/recommendations and verbally consented to treatment plan.      Follow up in about 4 weeks (around 9/26/2024) for Medication Management.    Brendan Case, [facility]

## 2024-09-06 ENCOUNTER — TELEPHONE (OUTPATIENT)
Dept: FAMILY MEDICINE | Facility: CLINIC | Age: 54
End: 2024-09-06
Payer: MEDICAID

## 2024-09-06 NOTE — TELEPHONE ENCOUNTER
Side effect will likely improve with time as she adjusts to the higher dose. Can take OTC anti-nausea med in the mean time, or resume lower dose if side effect is severe

## 2024-09-09 ENCOUNTER — TELEPHONE (OUTPATIENT)
Dept: FAMILY MEDICINE | Facility: CLINIC | Age: 54
End: 2024-09-09
Payer: MEDICAID

## 2024-09-09 DIAGNOSIS — G47.00 INSOMNIA, UNSPECIFIED TYPE: ICD-10-CM

## 2024-09-09 DIAGNOSIS — F41.1 GENERALIZED ANXIETY DISORDER: ICD-10-CM

## 2024-09-10 ENCOUNTER — TELEPHONE (OUTPATIENT)
Dept: FAMILY MEDICINE | Facility: CLINIC | Age: 54
End: 2024-09-10
Payer: MEDICAID

## 2024-09-10 RX ORDER — ALPRAZOLAM 0.5 MG/1
0.5 TABLET ORAL 3 TIMES DAILY PRN
Qty: 90 TABLET | Refills: 1 | Status: SHIPPED | OUTPATIENT
Start: 2024-09-10

## 2024-09-10 RX ORDER — ZOLPIDEM TARTRATE 10 MG/1
10 TABLET ORAL NIGHTLY PRN
Qty: 30 TABLET | Refills: 0 | Status: SHIPPED | OUTPATIENT
Start: 2024-09-10

## 2024-09-18 ENCOUNTER — OFFICE VISIT (OUTPATIENT)
Dept: BEHAVIORAL HEALTH | Facility: CLINIC | Age: 54
End: 2024-09-18
Payer: MEDICAID

## 2024-09-18 VITALS
DIASTOLIC BLOOD PRESSURE: 88 MMHG | OXYGEN SATURATION: 95 % | WEIGHT: 161.38 LBS | HEIGHT: 62 IN | TEMPERATURE: 98 F | SYSTOLIC BLOOD PRESSURE: 134 MMHG | BODY MASS INDEX: 29.7 KG/M2 | HEART RATE: 80 BPM

## 2024-09-18 DIAGNOSIS — F33.9 EPISODE OF RECURRENT MAJOR DEPRESSIVE DISORDER, UNSPECIFIED DEPRESSION EPISODE SEVERITY: Primary | ICD-10-CM

## 2024-09-18 DIAGNOSIS — G47.00 INSOMNIA, UNSPECIFIED TYPE: ICD-10-CM

## 2024-09-18 DIAGNOSIS — F41.1 GENERALIZED ANXIETY DISORDER: ICD-10-CM

## 2024-09-18 PROCEDURE — 3079F DIAST BP 80-89 MM HG: CPT | Mod: CPTII,,, | Performed by: NURSE PRACTITIONER

## 2024-09-18 PROCEDURE — 1160F RVW MEDS BY RX/DR IN RCRD: CPT | Mod: CPTII,,, | Performed by: NURSE PRACTITIONER

## 2024-09-18 PROCEDURE — 3061F NEG MICROALBUMINURIA REV: CPT | Mod: CPTII,,, | Performed by: NURSE PRACTITIONER

## 2024-09-18 PROCEDURE — 3044F HG A1C LEVEL LT 7.0%: CPT | Mod: CPTII,,, | Performed by: NURSE PRACTITIONER

## 2024-09-18 PROCEDURE — 83036 HEMOGLOBIN GLYCOSYLATED A1C: CPT | Performed by: NURSE PRACTITIONER

## 2024-09-18 PROCEDURE — 3008F BODY MASS INDEX DOCD: CPT | Mod: CPTII,,, | Performed by: NURSE PRACTITIONER

## 2024-09-18 PROCEDURE — 99214 OFFICE O/P EST MOD 30 MIN: CPT | Mod: ,,, | Performed by: NURSE PRACTITIONER

## 2024-09-18 PROCEDURE — 1159F MED LIST DOCD IN RCRD: CPT | Mod: CPTII,,, | Performed by: NURSE PRACTITIONER

## 2024-09-18 PROCEDURE — 3075F SYST BP GE 130 - 139MM HG: CPT | Mod: CPTII,,, | Performed by: NURSE PRACTITIONER

## 2024-09-18 PROCEDURE — 3066F NEPHROPATHY DOC TX: CPT | Mod: CPTII,,, | Performed by: NURSE PRACTITIONER

## 2024-09-18 PROCEDURE — 80053 COMPREHEN METABOLIC PANEL: CPT | Performed by: NURSE PRACTITIONER

## 2024-09-18 RX ORDER — ZOLPIDEM TARTRATE 10 MG/1
10 TABLET ORAL NIGHTLY PRN
Qty: 30 TABLET | Refills: 0 | Status: SHIPPED | OUTPATIENT
Start: 2024-09-18

## 2024-09-18 RX ORDER — DULOXETIN HYDROCHLORIDE 60 MG/1
60 CAPSULE, DELAYED RELEASE ORAL DAILY
Qty: 30 CAPSULE | Refills: 0 | Status: SHIPPED | OUTPATIENT
Start: 2024-09-18

## 2024-09-18 NOTE — PROGRESS NOTES
"PSYCHIATRIC FOLLOW-UP VISIT NOTE    Chief Complaint   Patient presents with    Medication Management     4 week medication management         History of Present Illness  54 y.o. year old Black or  female with hx of MDD, CATARINO, and insomnia seen today for follow-up appointment and medication management.  Patient states she was unable to tolerate higher dose of Cymbalta.  States she took higher dose for 3 or 4 days but felt increased anxiety and just not like myself.  She then resumed 60 mg dose and has been doing better with that regimen.  Depression and anxiety have decreased.  Still some residual symptoms and she feels these are related to discord she was currently having with her daughter.  Daughter lives with her with no financial obligations and patient feels she has been very ungrateful and mean at times.  She has been discussing this with her mother and other family members.  Reports good social support.  Sleeping well at night with current medication regimen.  Denies any side effects. Patient denies SI/HI. Denies hallucinations and does not appear to be responding to internal stimuli or be internally preoccupied. No manic symptoms noted.       Objective:     Vitals:  Vitals:    09/18/24 1029   BP: 134/88   BP Location: Right arm   Patient Position: Sitting   BP Method: Large (Manual)   Pulse: 80   Temp: 98.2 °F (36.8 °C)   TempSrc: Temporal   SpO2: 95%   Weight: 73.2 kg (161 lb 6 oz)   Height: 5' 2.01" (1.575 m)       Wt Readings from Last 3 Encounters:   09/18/24 1029 73.2 kg (161 lb 6 oz)   08/29/24 0920 73 kg (161 lb)   06/18/24 0959 75.9 kg (167 lb 5.3 oz)         Medication:    Current Outpatient Medications:     ADULT LOW DOSE ASPIRIN 81 mg EC tablet, Take 81 mg by mouth nightly., Disp: , Rfl:     ALPRAZolam (XANAX) 0.5 MG tablet, Take 1 tablet (0.5 mg total) by mouth 3 (three) times daily as needed for Anxiety., Disp: 90 tablet, Rfl: 1    amLODIPine (NORVASC) 10 MG tablet, Take 1 tablet " (10 mg total) by mouth once daily., Disp: 90 tablet, Rfl: 3    atorvastatin (LIPITOR) 20 MG tablet, Take 1 tablet (20 mg total) by mouth once daily., Disp: 90 tablet, Rfl: 3    blood sugar diagnostic Strp, To check BG 1 times daily, to use with insurance preferred meter, Disp: 30 each, Rfl: 0    blood sugar diagnostic Strp, To check BG one time daily, to use with insurance preferred meter, Disp: 35 each, Rfl: 11    carvediloL (COREG) 12.5 MG tablet, Take 1 tablet (12.5 mg total) by mouth 2 (two) times daily., Disp: 60 tablet, Rfl: 11    cetirizine (ZYRTEC) 10 MG tablet, Take 10 mg by mouth daily as needed., Disp: , Rfl:     clobetasoL (TEMOVATE) 0.05 % cream, Apply topically 2 (two) times daily., Disp: 60 g, Rfl: 11    cyclobenzaprine (FLEXERIL) 5 MG tablet, Take 1 tablet (5 mg total) by mouth 3 (three) times daily as needed for Muscle spasms., Disp: 30 tablet, Rfl: 2    fluticasone propionate (FLONASE) 50 mcg/actuation nasal spray, 1 spray (50 mcg total) by Each Nostril route 2 (two) times daily. (Patient taking differently: 1 spray by Each Nostril route as needed.), Disp: 11.1 mL, Rfl: 5    gabapentin (NEURONTIN) 400 MG capsule, TAKE 1 CAPSULE BY MOUTH EVERY MORNING  AND 2 AT BEDTIME, Disp: 270 capsule, Rfl: 3    ibuprofen (ADVIL,MOTRIN) 800 MG tablet, TAKE 1 TABLET BY MOUTH EVERY EIGHT HOURS, Disp: 30 tablet, Rfl: 5    levETIRAcetam (KEPPRA) 500 MG Tab, Take 1 tablet (500 mg total) by mouth 2 (two) times daily., Disp: 180 tablet, Rfl: 3    metFORMIN (GLUCOPHAGE-XR) 500 MG ER 24hr tablet, TAKE 2 TABLETS BY MOUTH NIGHTLY, Disp: 60 tablet, Rfl: 1    nitroGLYCERIN (NITROSTAT) 0.4 MG SL tablet, 1 TABLET UNDER TONGUE EVERY  5 MINUTES NO MORE THAN 3 TABS THEN GO TO ER FOR CHEST PAIN, Disp: 25 tablet, Rfl: 2    nystatin (MYCOSTATIN) ointment, APPLY TO AFFECTED AREA TWO TIMES A DAY, Disp: 30 g, Rfl: 2    semaglutide (OZEMPIC) 1 mg/dose (4 mg/3 mL), Inject 1 mg into the skin every 7 days., Disp: 3 mL, Rfl: 11    TRUE  "METRIX GLUCOSE METER Misc, Inject 1 each into the skin once daily., Disp: , Rfl:     TRUEPLUS LANCETS 33 gauge Misc, Apply 1 lancet  topically once daily., Disp: , Rfl:     DULoxetine (CYMBALTA) 60 MG capsule, Take 1 capsule (60 mg total) by mouth Daily., Disp: 30 capsule, Rfl: 0    zolpidem (AMBIEN) 10 mg Tab, Take 1 tablet (10 mg total) by mouth nightly as needed (insomnia)., Disp: 30 tablet, Rfl: 0       Significant Labs: - none at this time    Significant Imaging: - none at this time    Physical Exam  Vitals and nursing note reviewed.   Constitutional:       General: She is awake.      Appearance: Normal appearance.   Musculoskeletal:      Comments: Full ROM   Neurological:      Mental Status: She is alert.   Psychiatric:         Attention and Perception: Attention and perception normal. She does not perceive auditory or visual hallucinations.         Mood and Affect: Affect normal. Mood is anxious.         Speech: Speech normal.         Behavior: Behavior is cooperative.         Thought Content: Thought content does not include homicidal or suicidal ideation.         Cognition and Memory: Cognition and memory normal.          Review of Systems     Mental Status Exam:  Presentation:  - Appearance: 54 y.o. year old Black or  female, appears stated age, appears Casually dressed and Well groomed  - Motility: Erect when standing, Steady gait, No EPS or Tremors, No psychomotor agitation or retardation appreciated  - Behavior: anxious, cooperative, maintains eye contact  Speech:  - Character/Organization: spontaneous, fluent, normal volume, normal rate, normal rhythm  Emotional State:  - Mood: "anxious"   - Affect: congruent and appropriate  Thought:  - Process: logical, linear, organized , goal-directed  - Preoccupations: family  - Delusions: no persecutory, paranoid, or grandiose delusions appreciated  - Perception: denies AVH, not actively responding to internal stimuli  - SI/HI: " denies/denies  Sensorium & Intellect:  - Sensorium: AAOx4  - Memory: intact to recent and remote events  - Attention/Concentration: good/good  - Insight/Judgement: good/good    Gait: normal swing and stance  MSK:no rigidity appreciated    All other systems without acute issues unless noted in HPI      Assessment/Plan      ICD-10-CM ICD-9-CM    1. Episode of recurrent major depressive disorder, unspecified depression episode severity  F33.9 296.30 DULoxetine (CYMBALTA) 60 MG capsule      2. Generalized anxiety disorder  F41.1 300.02 DULoxetine (CYMBALTA) 60 MG capsule      3. Insomnia, unspecified type  G47.00 780.52 zolpidem (AMBIEN) 10 mg Tab         Decrease Cymbalta to 60 mg daily    Continue other medications without change    Potential side effects and risks vs benefits of current treatment plan reviewed with patient. Applicable black box warnings reviewed. Encouraged patient not to alter dosages or abruptly discontinue medications without contacting prescriber first, due to risk of worsening symptoms and decompensation of mental status. Warned of risks associated with herbal remedies and supplements while taking psychotropic medications and of the need to consult prescriber prior to adding any of these to current regimen. Patient should abstain from abuse of alcohol, prescription medications, and illicit drugs. Reviewed when to contact clinic and/or seek emergent care, such as but not limited to, onset/worsening SI/HI, hallucinations, delusions, manic symptoms. Pt verbalized understanding and agreement of these warnings/recommendations and verbally consented to treatment plan.     checked. Results as expected. No suspected diversion or abuse of controlled substances.        Follow up in about 4 weeks (around 10/16/2024) for Medication Management.    Brendan Case, JUSTICEP

## 2024-09-26 ENCOUNTER — PATIENT MESSAGE (OUTPATIENT)
Facility: CLINIC | Age: 54
End: 2024-09-26
Payer: MEDICAID

## 2024-09-27 PROBLEM — B37.2 CANDIDIASIS OF SKIN: Status: RESOLVED | Noted: 2023-03-01 | Resolved: 2024-09-27

## 2024-09-27 PROBLEM — F32.A DEPRESSIVE DISORDER: Status: RESOLVED | Noted: 2023-05-12 | Resolved: 2024-09-27

## 2024-09-27 PROBLEM — F33.42 RECURRENT MAJOR DEPRESSIVE DISORDER, IN FULL REMISSION: Status: ACTIVE | Noted: 2023-02-23

## 2024-09-27 PROBLEM — F41.9 ANXIETY: Status: RESOLVED | Noted: 2023-05-12 | Resolved: 2024-09-27

## 2024-10-01 ENCOUNTER — HOSPITAL ENCOUNTER (EMERGENCY)
Facility: HOSPITAL | Age: 54
Discharge: HOME | End: 2024-10-01
Payer: MEDICAID

## 2024-10-01 VITALS
DIASTOLIC BLOOD PRESSURE: 87 MMHG | BODY MASS INDEX: 29.63 KG/M2 | RESPIRATION RATE: 18 BRPM | HEART RATE: 71 BPM | TEMPERATURE: 99 F | HEIGHT: 62 IN | SYSTOLIC BLOOD PRESSURE: 128 MMHG | OXYGEN SATURATION: 95 % | WEIGHT: 161 LBS

## 2024-10-01 DIAGNOSIS — M25.552 LEFT HIP PAIN: ICD-10-CM

## 2024-10-01 PROCEDURE — 99284 EMERGENCY DEPT VISIT MOD MDM: CPT | Mod: 25

## 2024-10-01 PROCEDURE — 63600175 PHARM REV CODE 636 W HCPCS: Performed by: NURSE PRACTITIONER

## 2024-10-01 PROCEDURE — 96372 THER/PROPH/DIAG INJ SC/IM: CPT | Performed by: NURSE PRACTITIONER

## 2024-10-01 RX ORDER — CYCLOBENZAPRINE HCL 10 MG
10 TABLET ORAL 3 TIMES DAILY PRN
Qty: 15 TABLET | Refills: 0 | Status: SHIPPED | OUTPATIENT
Start: 2024-10-01 | End: 2024-10-04 | Stop reason: ALTCHOICE

## 2024-10-01 RX ORDER — HYDROCODONE BITARTRATE AND ACETAMINOPHEN 5; 325 MG/1; MG/1
1 TABLET ORAL EVERY 6 HOURS PRN
Qty: 12 TABLET | Refills: 0 | Status: SHIPPED | OUTPATIENT
Start: 2024-10-01 | End: 2024-10-07 | Stop reason: ALTCHOICE

## 2024-10-01 RX ORDER — HYDROMORPHONE HYDROCHLORIDE 1 MG/ML
1 INJECTION, SOLUTION INTRAMUSCULAR; INTRAVENOUS; SUBCUTANEOUS
Status: COMPLETED | OUTPATIENT
Start: 2024-10-01 | End: 2024-10-01

## 2024-10-01 RX ADMIN — HYDROMORPHONE HYDROCHLORIDE 1 MG: 1 INJECTION, SOLUTION INTRAMUSCULAR; INTRAVENOUS; SUBCUTANEOUS at 12:10

## 2024-10-01 NOTE — ED PROVIDER NOTES
Encounter Date: 10/1/2024       History     Chief Complaint   Patient presents with    Fall     Reports she fell going to put stuff in the trash at the road one week ago yesterday and is having left sided pain from her hip down.      54 year old female presents with left side hip pain after falling down a week ago.  Patient states unable to do ADLs at home due to pain.    The history is provided by the patient. No  was used.     Review of patient's allergies indicates:   Allergen Reactions    Iodine     Meperidine     Promethazine      Past Medical History:   Diagnosis Date    Anxiety     CHF (congestive heart failure)     CKD (chronic kidney disease)     Depression     Diabetes mellitus, type 2     DM (diabetes mellitus)     Elevated antinuclear antibody (LLOYD) level     Gout, unspecified     Hypertension     Insomnia     Lumbar radiculopathy, right     Neuropathy      Past Surgical History:   Procedure Laterality Date    CARDIAC CATHETERIZATION       SECTION       SECTION       SECTION      CHOLECYSTECTOMY      HYSTERECTOMY      INSERTION OF GENERATOR FOR SINGLE CHAMBER IMPLANTABLE CARDIOVERTER-DEFIBRILLATOR (ICD)  2020    INSERTION OF PACEMAKER       Family History   Problem Relation Name Age of Onset    No Known Problems Mother      No Known Problems Father      No Known Problems Sister      No Known Problems Brother      No Known Problems Maternal Aunt      No Known Problems Paternal Aunt      No Known Problems Maternal Uncle      No Known Problems Paternal Uncle      No Known Problems Maternal Grandfather      No Known Problems Maternal Grandmother      No Known Problems Paternal Grandfather      No Known Problems Paternal Grandmother      No Known Problems Cousin      No Known Problems Other       Social History     Tobacco Use    Smoking status: Never     Passive exposure: Never    Smokeless tobacco: Never   Substance Use Topics    Alcohol use: Not Currently  "    Comment: on occasion not weekly    Drug use: Not Currently     Types: "Crack" cocaine     Review of Systems   Musculoskeletal:  Positive for arthralgias and gait problem.   All other systems reviewed and are negative.      Physical Exam     Initial Vitals [10/01/24 1103]   BP Pulse Resp Temp SpO2   129/87 83 16 98.8 °F (37.1 °C) 96 %      MAP       --         Physical Exam    Nursing note and vitals reviewed.  Constitutional: She appears well-developed and well-nourished.   HENT:   Head: Normocephalic and atraumatic.   Eyes: Conjunctivae and EOM are normal. Pupils are equal, round, and reactive to light.   Neck: Neck supple.   Normal range of motion.  Cardiovascular:  Normal rate, regular rhythm, normal heart sounds and intact distal pulses.           Pulmonary/Chest: Breath sounds normal.   Abdominal: Abdomen is soft. Bowel sounds are normal.   Musculoskeletal:      Cervical back: Normal range of motion and neck supple.      Left hip: Tenderness present. Decreased range of motion.        Legs:       Comments: No edema or ecchymosis is noted. Required mild assistance to transfer from wheel chair to bed.     Neurological: She is alert and oriented to person, place, and time. She has normal strength. GCS eye subscore is 4. GCS verbal subscore is 5. GCS motor subscore is 6.   Skin: Skin is warm and dry. Capillary refill takes less than 2 seconds.   Psychiatric: She has a normal mood and affect. Her behavior is normal. Judgment and thought content normal.         ED Course   Procedures  Labs Reviewed - No data to display       Imaging Results              X-Ray Hip 2 or 3 views Left with Pelvis when performed (Final result)  Result time 10/01/24 12:01:13      Final result by Al Woodson MD (10/01/24 12:01:13)                   Impression:      No acute osseous abnormality.      Electronically signed by: Al Woodson MD  Date:    10/01/2024  Time:    12:01               Narrative:    EXAMINATION:  Pelvis one " view, left hip two views    CLINICAL HISTORY:  Left hip pain    COMPARISON:  None    FINDINGS:  There is no acute fracture subluxation.  Joint spaces are maintained.  No erosive or osseous destructive changes.                                       Medications   HYDROmorphone injection 1 mg (1 mg Intramuscular Given 10/1/24 6496)     Medical Decision Making  Problems Addressed:  Left hip pain: acute illness or injury    Amount and/or Complexity of Data Reviewed  Radiology: ordered. Decision-making details documented in ED Course.    Risk  Prescription drug management.                                      Clinical Impression:  Final diagnoses:  [M25.552] Left hip pain          ED Disposition Condition    Discharge Stable          ED Prescriptions       Medication Sig Dispense Start Date End Date Auth. Provider    HYDROcodone-acetaminophen (NORCO) 5-325 mg per tablet Take 1 tablet by mouth every 6 (six) hours as needed for Pain. 12 tablet 10/1/2024 10/4/2024 Duyen Gipson FNP    cyclobenzaprine (FLEXERIL) 10 MG tablet Take 1 tablet (10 mg total) by mouth 3 (three) times daily as needed for Muscle spasms. 15 tablet 10/1/2024 10/6/2024 Duyen Gipson FNP          Follow-up Information       Follow up With Specialties Details Why Contact Info    Zulma Zhang FNP-C Family Medicine In 3 days  [address] [facility]  [address] [facility] [address]  [phone]               Duyen Gipson FNP  10/01/24 3134

## 2024-10-02 ENCOUNTER — TELEPHONE (OUTPATIENT)
Dept: FAMILY MEDICINE | Facility: CLINIC | Age: 54
End: 2024-10-02
Payer: MEDICAID

## 2024-10-02 NOTE — TELEPHONE ENCOUNTER
Called and spoke with pt. Pt was seen in the ER yesterday for her fall a week ago. Pt was given Hydromorphone injection, Norco  5 mg and Flexeril 10mg.  X-Ray of hip showed no broken bones. Informed pt she can alternate heat and ice, tylenol and Ibuprofen. Schedule a follow up with Zulma for next week. Pt voiced understanding.

## 2024-10-04 ENCOUNTER — HOSPITAL ENCOUNTER (EMERGENCY)
Facility: HOSPITAL | Age: 54
Discharge: HOME | End: 2024-10-04
Payer: MEDICAID

## 2024-10-04 VITALS
HEART RATE: 93 BPM | BODY MASS INDEX: 29.63 KG/M2 | SYSTOLIC BLOOD PRESSURE: 122 MMHG | WEIGHT: 161 LBS | HEIGHT: 62 IN | TEMPERATURE: 98 F | RESPIRATION RATE: 18 BRPM | OXYGEN SATURATION: 98 % | DIASTOLIC BLOOD PRESSURE: 94 MMHG
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
## 2024-10-04 DIAGNOSIS — M79.605 LEFT LEG PAIN: Primary | ICD-10-CM

## 2024-10-04 PROCEDURE — 25000003 PHARM REV CODE 250: Performed by: NURSE PRACTITIONER

## 2024-10-04 PROCEDURE — 99284 EMERGENCY DEPT VISIT MOD MDM: CPT | Mod: 25

## 2024-10-04 PROCEDURE — 96372 THER/PROPH/DIAG INJ SC/IM: CPT | Performed by: NURSE PRACTITIONER

## 2024-10-04 PROCEDURE — 63600175 PHARM REV CODE 636 W HCPCS: Performed by: NURSE PRACTITIONER

## 2024-10-04 RX ORDER — IBUPROFEN 800 MG/1
800 TABLET ORAL EVERY 6 HOURS PRN
Qty: 20 TABLET | Refills: 0 | Status: SHIPPED | OUTPATIENT
Start: 2024-10-04 | End: 2024-10-07 | Stop reason: SDUPTHER

## 2024-10-04 RX ORDER — DEXAMETHASONE 4 MG/1
4 TABLET ORAL EVERY 12 HOURS
Qty: 10 TABLET | Refills: 0 | Status: SHIPPED | OUTPATIENT
Start: 2024-10-04 | End: 2024-10-08 | Stop reason: ALTCHOICE

## 2024-10-04 RX ORDER — HYDROCODONE BITARTRATE AND ACETAMINOPHEN 10; 325 MG/1; MG/1
1 TABLET ORAL
Status: COMPLETED | OUTPATIENT
Start: 2024-10-04 | End: 2024-10-04

## 2024-10-04 RX ORDER — TIZANIDINE 4 MG/1
4 TABLET ORAL EVERY 6 HOURS PRN
Qty: 20 TABLET | Refills: 0 | Status: SHIPPED | OUTPATIENT
Start: 2024-10-04 | End: 2024-10-07

## 2024-10-04 RX ORDER — DEXAMETHASONE 4 MG/1
12 TABLET ORAL
Status: COMPLETED | OUTPATIENT
Start: 2024-10-04 | End: 2024-10-04

## 2024-10-04 RX ORDER — ORPHENADRINE CITRATE 30 MG/ML
60 INJECTION INTRAMUSCULAR; INTRAVENOUS
Status: COMPLETED | OUTPATIENT
Start: 2024-10-04 | End: 2024-10-04

## 2024-10-04 RX ADMIN — DEXAMETHASONE 12 MG: 4 TABLET ORAL at 03:10

## 2024-10-04 RX ADMIN — HYDROCODONE BITARTRATE AND ACETAMINOPHEN 1 TABLET: 10; 325 TABLET ORAL at 03:10

## 2024-10-04 RX ADMIN — ORPHENADRINE CITRATE 60 MG: 30 INJECTION, SOLUTION INTRAMUSCULAR; INTRAVENOUS at 03:10

## 2024-10-04 NOTE — ED PROVIDER NOTES
"Encounter Date: 10/4/2024       History     Chief Complaint   Patient presents with    Hip Pain    Fall     Pt presented to ED per POV with c/o Left hip and leg pain onset 3 days ago. Pt reports she was seen on Monday for left hip pain and went home and fell again.      This 54-year-old female patient presents with complaints of left hip, knee, ankle pain and intermittent numbness after she fell on  after she went home from the emergency room for falling the 1st time.  She states "my pain is 20/10."      Review of patient's allergies indicates:   Allergen Reactions    Iodine     Meperidine     Promethazine      Past Medical History:   Diagnosis Date    Anxiety     CHF (congestive heart failure)     CKD (chronic kidney disease)     Depression     Diabetes mellitus, type 2     DM (diabetes mellitus)     Elevated antinuclear antibody (LLOYD) level     Gout, unspecified     Hypertension     Insomnia     Lumbar radiculopathy, right     Neuropathy      Past Surgical History:   Procedure Laterality Date    CARDIAC CATHETERIZATION       SECTION       SECTION       SECTION      CHOLECYSTECTOMY      HYSTERECTOMY      INSERTION OF GENERATOR FOR SINGLE CHAMBER IMPLANTABLE CARDIOVERTER-DEFIBRILLATOR (ICD)  2020    INSERTION OF PACEMAKER       Family History   Problem Relation Name Age of Onset    No Known Problems Mother      No Known Problems Father      No Known Problems Sister      No Known Problems Brother      No Known Problems Maternal Aunt      No Known Problems Paternal Aunt      No Known Problems Maternal Uncle      No Known Problems Paternal Uncle      No Known Problems Maternal Grandfather      No Known Problems Maternal Grandmother      No Known Problems Paternal Grandfather      No Known Problems Paternal Grandmother      No Known Problems Cousin      No Known Problems Other       Social History     Tobacco Use    Smoking status: Never     Passive exposure: Never    Smokeless " "tobacco: Never   Substance Use Topics    Alcohol use: Not Currently     Comment: on occasion not weekly    Drug use: Not Currently     Types: "Crack" cocaine     Review of Systems   Musculoskeletal:         Left hip, knee and ankle pain   All other systems reviewed and are negative.      Physical Exam     Initial Vitals [10/04/24 1401]   BP Pulse Resp Temp SpO2   (!) 122/94 93 18 98.2 °F (36.8 °C) 98 %      MAP       --         Physical Exam    Nursing note and vitals reviewed.  Constitutional: She appears well-developed and well-nourished.   HENT:   Head: Normocephalic and atraumatic.   Eyes: Pupils are equal, round, and reactive to light.   Neck: Neck supple.   Normal range of motion.  Cardiovascular:  Normal rate, regular rhythm and normal heart sounds.           Pulmonary/Chest: Breath sounds normal.   Musculoskeletal:         General: Normal range of motion.      Cervical back: Normal range of motion and neck supple.      Comments: Hip, knee and ankle tenderness, no swelling or deformity     Neurological: She is alert and oriented to person, place, and time.   Skin: Skin is warm and dry. Capillary refill takes less than 2 seconds.   Psychiatric: She has a normal mood and affect. Her behavior is normal. Judgment and thought content normal.         ED Course   Procedures  Labs Reviewed - No data to display       Imaging Results              X-Ray Lumbar Spine 2 Or 3 Views (Final result)  Result time 10/04/24 15:22:13      Final result by Ari Briones MD (10/04/24 15:22:13)                   Impression:      No acute osseous abnormality identified in the lumbar spine.      Electronically signed by: Ari Briones  Date:    10/04/2024  Time:    15:22               Narrative:    EXAMINATION:  XR LUMBAR SPINE 2 OR 3 VIEWS    CLINICAL HISTORY:  fall;    TECHNIQUE:  And lateral views of the lumbar spine    COMPARISON:  None    FINDINGS:  Normal lumbar alignment.  Vertebral body heights are maintained.  No acute " displaced fracture or osseous destructive process identified.  Moderate intervertebral disc space narrowing at L5-S1.  Multilevel facet arthropathy, most advanced at the lower lumbar levels.                                       X-Ray Hip 2 or 3 views Left with Pelvis when performed (Final result)  Result time 10/04/24 15:08:57      Final result by Reinaldo Chase MD (10/04/24 15:08:57)                   Impression:      No acute findings.      Electronically signed by: Colby Chase MD  Date:    10/04/2024  Time:    15:08               Narrative:    EXAMINATION:  XR HIP WITH PELVIS WHEN PERFORMED 2 OR 3 VIEWS LEFT    CLINICAL HISTORY:  Unspecified fall, initial encounter    TECHNIQUE:  AP view of the pelvis and frog leg lateral view of the left hip were performed.    COMPARISON:  None    FINDINGS:  Bones intact without fracture or dislocation.  Joint spaces maintained.  Soft tissues normal.                                       X-Ray Knee Complete 4 or More Views Left (Final result)  Result time 10/04/24 15:08:35   Procedure changed from X-Ray Knee 3 View Left     Final result by Reinaldo Chase MD (10/04/24 15:08:35)                   Impression:      No acute findings.      Electronically signed by: Colby Chase MD  Date:    10/04/2024  Time:    15:08               Narrative:    EXAMINATION:  XR KNEE COMP 4 OR MORE VIEWS LEFT    CLINICAL HISTORY:  Unspecified fall, initial encounterpain in extremities;pain;    TECHNIQUE:  AP, Lateral, Sunrise and Tunnel views of the left knee were preformed    COMPARISON:  None    FINDINGS:  Bones intact without fracture or dislocation.  Joint spaces maintained.  Soft tissues normal.                                       X-Ray Ankle Complete Left (Final result)  Result time 10/04/24 15:08:17      Final result by Reinaldo Chase MD (10/04/24 15:08:17)                   Impression:      No acute findings.      Electronically signed by: Colby Chase  "MD  Date:    10/04/2024  Time:    15:08               Narrative:    EXAMINATION:  XR ANKLE COMPLETE 3 VIEW LEFT    CLINICAL HISTORY:  Unspecified fall, initial encounter    TECHNIQUE:  AP, lateral and oblique views of the left ankle were performed.    COMPARISON:  None    FINDINGS:  Bones intact without fracture or dislocation.  Joint spaces maintained.  Soft tissues normal.                                       Medications   HYDROcodone-acetaminophen  mg per tablet 1 tablet (1 tablet Oral Given 10/4/24 1503)   orphenadrine injection 60 mg (60 mg Intramuscular Given 10/4/24 1503)   dexAMETHasone tablet 12 mg (12 mg Oral Given 10/4/24 1503)     Medical Decision Making  This 54-year-old female patient presents with complaints of left hip, knee, ankle pain and intermittent numbness after she fell on October 1st after she went home from the emergency room for falling the 1st time.  She states "my pain is 20/10."    ER diagnosis--bowel, left leg pain  Differential diagnosis includes but is not limited to left hip fracture, ankle fracture, both these diagnoses are less likely related to exam and radiology results  This patient will discharged home stable, she states her pain is improved mildly.  She will follow up with her PCP as scheduled on Monday.  If she has any increase in pain she will return to any ER or urgent care for further evaluation      Amount and/or Complexity of Data Reviewed  Radiology: ordered.    Risk  Prescription drug management.                                      Clinical Impression:  Final diagnoses:  [W19.XXXA] Fall  [M79.605] Left leg pain (Primary)          ED Disposition Condition    Discharge Stable          ED Prescriptions       Medication Sig Dispense Start Date End Date Auth. Provider    tiZANidine (ZANAFLEX) 4 MG tablet Take 1 tablet (4 mg total) by mouth every 6 (six) hours as needed (Pain). 20 tablet 10/4/2024 10/14/2024 Manuela Adams FNP    dexAMETHasone (DECADRON) 4 MG Tab " Take 1 tablet (4 mg total) by mouth every 12 (twelve) hours. for 5 days 10 tablet 10/4/2024 10/9/2024 Manuela Adams FNP    ibuprofen (ADVIL,MOTRIN) 800 MG tablet Take 1 tablet (800 mg total) by mouth every 6 (six) hours as needed for Pain. 20 tablet 10/4/2024 -- Manuela Adams FNP          Follow-up Information       Follow up With Specialties Details Why Contact Info    Zulma Zhang FNP-[facility] On 10/7/2024 Your appointment time is to 2p.m. [address] [facility] [address]  [phone]               Manuela Adams FNP  10/04/24 5634

## 2024-10-04 NOTE — DISCHARGE INSTRUCTIONS
Take medication as prescribed, keep your follow up appointment with your primary care provider that is scheduled for Monday at 2:00 p.m..  If you have any increase in pain or other concerns please go to the nearest emergency room or urgent care for further evaluation

## 2024-10-07 ENCOUNTER — OFFICE VISIT (OUTPATIENT)
Dept: FAMILY MEDICINE | Facility: CLINIC | Age: 54
End: 2024-10-07
Payer: MEDICAID

## 2024-10-07 VITALS
OXYGEN SATURATION: 96 % | DIASTOLIC BLOOD PRESSURE: 82 MMHG | HEART RATE: 93 BPM | HEIGHT: 62 IN | TEMPERATURE: 97 F | BODY MASS INDEX: 28.78 KG/M2 | WEIGHT: 156.38 LBS | SYSTOLIC BLOOD PRESSURE: 118 MMHG

## 2024-10-07 DIAGNOSIS — M54.42 ACUTE MIDLINE LOW BACK PAIN WITH LEFT-SIDED SCIATICA: ICD-10-CM

## 2024-10-07 DIAGNOSIS — T14.8XXA HEMATOMA: ICD-10-CM

## 2024-10-07 DIAGNOSIS — M25.552 LEFT HIP PAIN: Primary | ICD-10-CM

## 2024-10-07 LAB
LEFT EYE DM RETINOPATHY: NEGATIVE
RIGHT EYE DM RETINOPATHY: NEGATIVE

## 2024-10-07 PROCEDURE — 1160F RVW MEDS BY RX/DR IN RCRD: CPT | Mod: CPTII,,, | Performed by: NURSE PRACTITIONER

## 2024-10-07 PROCEDURE — 1159F MED LIST DOCD IN RCRD: CPT | Mod: CPTII,,, | Performed by: NURSE PRACTITIONER

## 2024-10-07 PROCEDURE — 3074F SYST BP LT 130 MM HG: CPT | Mod: CPTII,,, | Performed by: NURSE PRACTITIONER

## 2024-10-07 PROCEDURE — 3044F HG A1C LEVEL LT 7.0%: CPT | Mod: CPTII,,, | Performed by: NURSE PRACTITIONER

## 2024-10-07 PROCEDURE — 99214 OFFICE O/P EST MOD 30 MIN: CPT | Mod: ,,, | Performed by: NURSE PRACTITIONER

## 2024-10-07 PROCEDURE — 3066F NEPHROPATHY DOC TX: CPT | Mod: CPTII,,, | Performed by: NURSE PRACTITIONER

## 2024-10-07 PROCEDURE — 3061F NEG MICROALBUMINURIA REV: CPT | Mod: CPTII,,, | Performed by: NURSE PRACTITIONER

## 2024-10-07 PROCEDURE — 3008F BODY MASS INDEX DOCD: CPT | Mod: CPTII,,, | Performed by: NURSE PRACTITIONER

## 2024-10-07 PROCEDURE — 3079F DIAST BP 80-89 MM HG: CPT | Mod: CPTII,,, | Performed by: NURSE PRACTITIONER

## 2024-10-07 RX ORDER — IBUPROFEN 800 MG/1
800 TABLET ORAL EVERY 6 HOURS PRN
Qty: 30 TABLET | Refills: 0 | Status: SHIPPED | OUTPATIENT
Start: 2024-10-07

## 2024-10-07 RX ORDER — KETOROLAC TROMETHAMINE 30 MG/ML
60 INJECTION, SOLUTION INTRAMUSCULAR; INTRAVENOUS ONCE
Status: COMPLETED | OUTPATIENT
Start: 2024-10-07 | End: 2024-10-07

## 2024-10-07 RX ADMIN — KETOROLAC TROMETHAMINE 60 MG: 30 INJECTION, SOLUTION INTRAMUSCULAR; INTRAVENOUS at 02:10

## 2024-10-07 NOTE — PROGRESS NOTES
"Patient ID: Dasha Curiel  : 1970    Chief Complaint: Back Pain and Hip Pain    Allergies: Patient is allergic to iodine, meperidine, and promethazine.     History of Present Illness:  The patient is a 54 y.o. Black or  female who presents to clinic for follow up on Back Pain and Hip Pain     Here for hospital follow up.  Reported to the ER twice this month with complaints of left hip pain.  Onset was after a mechanical fall in her driveway after stepping in a hole; the initial fall was in the first week of September. Hip/Pelvis Xr negative for any acute finding. She did have another fall after her first ER visit, she tells me that her left ankle "gave out" and that is why she fell the second time. She has a hematoma to the left buttocks and feels a numbness/tingling to the left leg all the way to her foot. She also has lower back pain, "right in the center."    She has been taking Ibuprofen, applying ice and heat. Hydrocodone has not helped.     Social History:  reports that she has never smoked. She has never been exposed to tobacco smoke. She has never used smokeless tobacco. She reports that she does not currently use alcohol. She reports that she does not currently use drugs after having used the following drugs: "Crack" cocaine.    Past Medical History:  has a past medical history of Anxiety, CHF (congestive heart failure), CKD (chronic kidney disease), Depression, Diabetes mellitus, type 2, DM (diabetes mellitus), Elevated antinuclear antibody (LLOYD) level, Gout, unspecified, Hypertension, Insomnia, Lumbar radiculopathy, right, and Neuropathy.    Current Medications:  Current Outpatient Medications   Medication Instructions    ADULT LOW DOSE ASPIRIN 81 mg, Nightly    ALPRAZolam (XANAX) 0.5 mg, Oral, 3 times daily PRN    amLODIPine (NORVASC) 10 mg, Oral, Daily    atorvastatin (LIPITOR) 20 mg, Oral, Daily    blood sugar diagnostic Strp To check BG 1 times daily, to use with insurance " "preferred meter    blood sugar diagnostic Strp To check BG one time daily, to use with insurance preferred meter    carvediloL (COREG) 12.5 mg, Oral, 2 times daily    cetirizine (ZYRTEC) 10 mg, Daily PRN    clobetasoL (TEMOVATE) 0.05 % cream Topical (Top), 2 times daily    dexAMETHasone (DECADRON) 4 mg, Oral, Every 12 hours    DULoxetine (CYMBALTA) 60 mg, Oral, Daily    fluticasone propionate (FLONASE) 50 mcg, Each Nostril, 2 times daily    gabapentin (NEURONTIN) 400 MG capsule TAKE 1 CAPSULE BY MOUTH EVERY MORNING  AND 2 AT BEDTIME    ibuprofen (ADVIL,MOTRIN) 800 mg, Oral, Every 6 hours PRN    levETIRAcetam (KEPPRA) 500 mg, Oral, 2 times daily    metFORMIN (GLUCOPHAGE-XR) 1,000 mg, Oral, Nightly    nitroGLYCERIN (NITROSTAT) 0.4 MG SL tablet 1 TABLET UNDER TONGUE EVERY  5 MINUTES NO MORE THAN 3 TABS THEN GO TO ER FOR CHEST PAIN    nystatin (MYCOSTATIN) ointment APPLY TO AFFECTED AREA TWO TIMES A DAY    OZEMPIC 1 mg, Subcutaneous, Every 7 days    TRUE METRIX GLUCOSE METER Misc 1 each, Daily    TRUEPLUS LANCETS 33 gauge Misc 1 lancet , Daily    zolpidem (AMBIEN) 10 mg, Oral, Nightly PRN       Review of Systems   See HPI    Visit Vitals  /82 (BP Location: Left arm, Patient Position: Sitting)   Pulse 93   Temp 97.3 °F (36.3 °C) (Temporal)   Ht 5' 2.01" (1.575 m)   Wt 70.9 kg (156 lb 6.4 oz)   SpO2 96%   BMI 28.60 kg/m²       Physical Exam  Vitals reviewed.   Constitutional:       Appearance: Normal appearance. She is obese.   Eyes:      Conjunctiva/sclera: Conjunctivae normal.   Cardiovascular:      Heart sounds: Normal heart sounds.   Pulmonary:      Breath sounds: Normal breath sounds.   Musculoskeletal:      Lumbar back: Tenderness (left SIJ tenderness) and bony tenderness (lower l-spine) present. Normal range of motion. Negative right straight leg raise test and negative left straight leg raise test.   Skin:     General: Skin is warm and dry.   Neurological:      Mental Status: She is oriented to person, " place, and time.          Labs Reviewed:  Chemistry:  Lab Results   Component Value Date     09/18/2024    K 3.9 09/18/2024    BUN 17 09/18/2024    CREATININE 0.87 09/18/2024    EGFRNORACEVR 80 09/18/2024    GLUCOSE 82 09/18/2024    CALCIUM 10.9 (H) 09/18/2024    ALKPHOS 80 09/18/2024    LABPROT 7.6 09/18/2024    ALBUMIN 4.7 09/18/2024    BILIDIR 0.10 05/24/2019    IBILI 0.30 05/24/2019    AST 23 09/18/2024    ALT 18 09/18/2024    MG 2.5 (H) 05/23/2019    TSH 1.120 04/03/2024        Lab Results   Component Value Date    HGBA1C 5.6 09/18/2024          Assessment & Plan:  1. Left hip pain  Comments:  Xray negative.    2. Acute midline low back pain with left-sided sciatica  Comments:  Toradol 60 mg IM today.   Take Dexamethasone rx from ER (as directed)  Continue Ibuprfen tomorrow and muscle relaxers.  Ice/heat therapy to low back.  Orders:  -     ketorolac injection 60 mg  -     ibuprofen (ADVIL,MOTRIN) 800 MG tablet; Take 1 tablet (800 mg total) by mouth every 6 (six) hours as needed for Pain.  Dispense: 30 tablet; Refill: 0    3. Hematoma  Comments:  Apply heat to hematoma of the left buttocks         Future Appointments   Date Time Provider Department Center   10/17/2024 11:30 AM Brendan Case PMHNAEXL [facility]   10/23/2024  1:00 PM Zulma Zhang FNP-C [facility]   10/23/2024  2:00 PM [facility]       Follow up for 2 wk f/u lower back pain. Call sooner if needed.    CELY Kramer    Lab Frequency Next Occurrence   Mammo Digital Screening Bilat w/ Jayesh Once 03/27/2023   Ambulatory referral/consult to Ophthalmology Once 05/02/2024   Ambulatory referral/consult to [facility] COLONOSCOPY Once 05/02/2024

## 2024-10-08 ENCOUNTER — TELEPHONE (OUTPATIENT)
Dept: FAMILY MEDICINE | Facility: CLINIC | Age: 54
End: 2024-10-08
Payer: MEDICAID

## 2024-10-08 DIAGNOSIS — M54.42 ACUTE MIDLINE LOW BACK PAIN WITH LEFT-SIDED SCIATICA: Primary | ICD-10-CM

## 2024-10-08 RX ORDER — PREDNISONE 20 MG/1
40 TABLET ORAL DAILY
Qty: 6 TABLET | Refills: 0 | Status: SHIPPED | OUTPATIENT
Start: 2024-10-08 | End: 2024-10-11

## 2024-10-08 NOTE — TELEPHONE ENCOUNTER
I called Marry Valencia said that pt signed for 4 prescriptions and that was one of them. She also said that if you want to send another prescription, her insurance will cover it

## 2024-10-10 ENCOUNTER — PATIENT OUTREACH (OUTPATIENT)
Dept: ADMINISTRATIVE | Facility: HOSPITAL | Age: 54
End: 2024-10-10
Payer: MEDICAID

## 2024-10-10 ENCOUNTER — TELEPHONE (OUTPATIENT)
Dept: FAMILY MEDICINE | Facility: CLINIC | Age: 54
End: 2024-10-10

## 2024-10-10 DIAGNOSIS — M54.16 LUMBAR RADICULOPATHY: ICD-10-CM

## 2024-10-10 DIAGNOSIS — M54.42 ACUTE MIDLINE LOW BACK PAIN WITH LEFT-SIDED SCIATICA: Primary | ICD-10-CM

## 2024-10-10 RX ORDER — PREDNISONE 10 MG/1
TABLET ORAL
Qty: 54 TABLET | Refills: 0 | Status: SHIPPED | OUTPATIENT
Start: 2024-10-10 | End: 2024-10-29

## 2024-10-21 ENCOUNTER — TELEPHONE (OUTPATIENT)
Dept: FAMILY MEDICINE | Facility: CLINIC | Age: 54
End: 2024-10-21
Payer: MEDICAID

## 2024-10-21 DIAGNOSIS — M54.50 LUMBAR BACK PAIN: Primary | ICD-10-CM

## 2024-10-21 NOTE — TELEPHONE ENCOUNTER
Patient requesting cyclobenzaprine 10mg she states that tizanidine maker her nervous. She is aware that Zulma is out of office today. Please advise.

## 2024-10-22 RX ORDER — CYCLOBENZAPRINE HCL 10 MG
10 TABLET ORAL NIGHTLY
Qty: 30 TABLET | Refills: 5 | Status: SHIPPED | OUTPATIENT
Start: 2024-10-22

## 2024-10-28 ENCOUNTER — OFFICE VISIT (OUTPATIENT)
Dept: BEHAVIORAL HEALTH | Facility: CLINIC | Age: 54
End: 2024-10-28
Payer: MEDICAID

## 2024-10-28 VITALS
HEIGHT: 62 IN | SYSTOLIC BLOOD PRESSURE: 118 MMHG | HEART RATE: 90 BPM | BODY MASS INDEX: 29.08 KG/M2 | OXYGEN SATURATION: 97 % | TEMPERATURE: 98 F | WEIGHT: 158.06 LBS | DIASTOLIC BLOOD PRESSURE: 86 MMHG

## 2024-10-28 DIAGNOSIS — F41.1 GENERALIZED ANXIETY DISORDER: ICD-10-CM

## 2024-10-28 DIAGNOSIS — F33.9 EPISODE OF RECURRENT MAJOR DEPRESSIVE DISORDER, UNSPECIFIED DEPRESSION EPISODE SEVERITY: Primary | ICD-10-CM

## 2024-10-28 DIAGNOSIS — G47.00 INSOMNIA, UNSPECIFIED TYPE: ICD-10-CM

## 2024-10-28 PROCEDURE — 3079F DIAST BP 80-89 MM HG: CPT | Mod: CPTII,,, | Performed by: NURSE PRACTITIONER

## 2024-10-28 PROCEDURE — 99214 OFFICE O/P EST MOD 30 MIN: CPT | Mod: ,,, | Performed by: NURSE PRACTITIONER

## 2024-10-28 PROCEDURE — 1159F MED LIST DOCD IN RCRD: CPT | Mod: CPTII,,, | Performed by: NURSE PRACTITIONER

## 2024-10-28 PROCEDURE — 3044F HG A1C LEVEL LT 7.0%: CPT | Mod: CPTII,,, | Performed by: NURSE PRACTITIONER

## 2024-10-28 PROCEDURE — 3066F NEPHROPATHY DOC TX: CPT | Mod: CPTII,,, | Performed by: NURSE PRACTITIONER

## 2024-10-28 PROCEDURE — 3008F BODY MASS INDEX DOCD: CPT | Mod: CPTII,,, | Performed by: NURSE PRACTITIONER

## 2024-10-28 PROCEDURE — 1160F RVW MEDS BY RX/DR IN RCRD: CPT | Mod: CPTII,,, | Performed by: NURSE PRACTITIONER

## 2024-10-28 PROCEDURE — 3061F NEG MICROALBUMINURIA REV: CPT | Mod: CPTII,,, | Performed by: NURSE PRACTITIONER

## 2024-10-28 PROCEDURE — 3074F SYST BP LT 130 MM HG: CPT | Mod: CPTII,,, | Performed by: NURSE PRACTITIONER

## 2024-10-28 RX ORDER — ZOLPIDEM TARTRATE 10 MG/1
10 TABLET ORAL NIGHTLY PRN
Qty: 30 TABLET | Refills: 1 | Status: SHIPPED | OUTPATIENT
Start: 2024-10-28

## 2024-10-28 RX ORDER — DULOXETIN HYDROCHLORIDE 60 MG/1
60 CAPSULE, DELAYED RELEASE ORAL DAILY
Qty: 30 CAPSULE | Refills: 1 | Status: SHIPPED | OUTPATIENT
Start: 2024-10-28

## 2024-10-28 RX ORDER — ALPRAZOLAM 0.5 MG/1
0.5 TABLET ORAL 3 TIMES DAILY PRN
Qty: 90 TABLET | Refills: 1 | Status: SHIPPED | OUTPATIENT
Start: 2024-10-28

## 2024-11-07 DIAGNOSIS — E11.9 TYPE 2 DIABETES MELLITUS WITHOUT COMPLICATION, WITHOUT LONG-TERM CURRENT USE OF INSULIN: ICD-10-CM

## 2024-11-07 RX ORDER — METFORMIN HYDROCHLORIDE 500 MG/1
1000 TABLET, EXTENDED RELEASE ORAL NIGHTLY
Qty: 60 TABLET | Refills: 1 | Status: SHIPPED | OUTPATIENT
Start: 2024-11-07

## 2024-11-11 DIAGNOSIS — E11.9 TYPE 2 DIABETES MELLITUS WITHOUT COMPLICATION, WITHOUT LONG-TERM CURRENT USE OF INSULIN: ICD-10-CM

## 2024-11-11 RX ORDER — SEMAGLUTIDE 1.34 MG/ML
INJECTION, SOLUTION SUBCUTANEOUS
Qty: 3 ML | Refills: 1 | Status: SHIPPED | OUTPATIENT
Start: 2024-11-11

## 2024-11-19 ENCOUNTER — TELEPHONE (OUTPATIENT)
Dept: FAMILY MEDICINE | Facility: CLINIC | Age: 54
End: 2024-11-19
Payer: MEDICAID

## 2024-11-19 NOTE — TELEPHONE ENCOUNTER
Called patient and explained that she should have refill on both medications. Verbalized understanding.

## 2024-11-26 DIAGNOSIS — E78.5 HYPERLIPIDEMIA, UNSPECIFIED HYPERLIPIDEMIA TYPE: ICD-10-CM

## 2024-11-26 DIAGNOSIS — I10 HYPERTENSION, UNSPECIFIED TYPE: ICD-10-CM

## 2024-11-26 RX ORDER — AMLODIPINE BESYLATE 10 MG/1
10 TABLET ORAL
Qty: 90 TABLET | Refills: 0 | Status: SHIPPED | OUTPATIENT
Start: 2024-11-26

## 2024-11-26 RX ORDER — ATORVASTATIN CALCIUM 20 MG/1
20 TABLET, FILM COATED ORAL
Qty: 90 TABLET | Refills: 0 | Status: SHIPPED | OUTPATIENT
Start: 2024-11-26

## 2024-12-02 DIAGNOSIS — Z79.899 MEDICATION MANAGEMENT: ICD-10-CM

## 2024-12-02 RX ORDER — LEVETIRACETAM 500 MG/1
500 TABLET ORAL 2 TIMES DAILY
Qty: 180 TABLET | Refills: 3 | Status: SHIPPED | OUTPATIENT
Start: 2024-12-02

## 2024-12-02 RX ORDER — GABAPENTIN 400 MG/1
CAPSULE ORAL
Qty: 270 CAPSULE | Refills: 3 | Status: SHIPPED | OUTPATIENT
Start: 2024-12-02

## 2024-12-12 ENCOUNTER — OFFICE VISIT (OUTPATIENT)
Dept: FAMILY MEDICINE | Facility: CLINIC | Age: 54
End: 2024-12-12
Payer: MEDICAID

## 2024-12-12 VITALS
HEART RATE: 88 BPM | HEIGHT: 62 IN | WEIGHT: 157 LBS | DIASTOLIC BLOOD PRESSURE: 82 MMHG | TEMPERATURE: 97 F | SYSTOLIC BLOOD PRESSURE: 120 MMHG | BODY MASS INDEX: 28.89 KG/M2 | OXYGEN SATURATION: 95 %

## 2024-12-12 DIAGNOSIS — E78.2 MIXED HYPERLIPIDEMIA: ICD-10-CM

## 2024-12-12 DIAGNOSIS — F41.1 GENERALIZED ANXIETY DISORDER: ICD-10-CM

## 2024-12-12 DIAGNOSIS — F51.01 PRIMARY INSOMNIA: ICD-10-CM

## 2024-12-12 DIAGNOSIS — F33.42 RECURRENT MAJOR DEPRESSIVE DISORDER, IN FULL REMISSION: ICD-10-CM

## 2024-12-12 DIAGNOSIS — G62.9 NEUROPATHY: ICD-10-CM

## 2024-12-12 DIAGNOSIS — D86.3 CUTANEOUS SARCOIDOSIS: ICD-10-CM

## 2024-12-12 DIAGNOSIS — M25.572 ARTHRALGIA OF BOTH ANKLES: ICD-10-CM

## 2024-12-12 DIAGNOSIS — E11.9 TYPE 2 DIABETES MELLITUS WITHOUT COMPLICATION, WITHOUT LONG-TERM CURRENT USE OF INSULIN: ICD-10-CM

## 2024-12-12 DIAGNOSIS — G40.909 SEIZURE DISORDER: ICD-10-CM

## 2024-12-12 DIAGNOSIS — N18.2 STAGE 2 CHRONIC KIDNEY DISEASE: ICD-10-CM

## 2024-12-12 DIAGNOSIS — I10 PRIMARY HYPERTENSION: ICD-10-CM

## 2024-12-12 DIAGNOSIS — I50.22 CHRONIC SYSTOLIC CONGESTIVE HEART FAILURE: ICD-10-CM

## 2024-12-12 DIAGNOSIS — Z00.01 ENCOUNTER FOR WELL ADULT EXAM WITH ABNORMAL FINDINGS: Primary | ICD-10-CM

## 2024-12-12 DIAGNOSIS — M25.571 ARTHRALGIA OF BOTH ANKLES: ICD-10-CM

## 2024-12-12 PROBLEM — R29.6 FALLS: Status: RESOLVED | Noted: 2023-02-28 | Resolved: 2024-12-12

## 2024-12-12 PROBLEM — M79.605 LEFT LEG PAIN: Status: RESOLVED | Noted: 2024-10-04 | Resolved: 2024-12-12

## 2024-12-12 PROBLEM — M25.552 LEFT HIP PAIN: Status: RESOLVED | Noted: 2024-10-01 | Resolved: 2024-12-12

## 2024-12-12 PROBLEM — E66.9 OBESITY: Status: RESOLVED | Noted: 2023-05-12 | Resolved: 2024-12-12
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
## 2024-12-12 PROBLEM — R76.8 ELEVATED ANTINUCLEAR ANTIBODY (ANA) LEVEL: Status: RESOLVED | Noted: 2023-05-12 | Resolved: 2024-12-12

## 2024-12-12 PROBLEM — M62.838 MUSCLE SPASM: Status: RESOLVED | Noted: 2023-03-01 | Resolved: 2024-12-12

## 2024-12-12 RX ORDER — IBUPROFEN 800 MG/1
800 TABLET ORAL EVERY 6 HOURS PRN
Qty: 30 TABLET | Refills: 2 | Status: SHIPPED | OUTPATIENT
Start: 2024-12-12

## 2024-12-12 NOTE — ASSESSMENT & PLAN NOTE
Diabetes labs:   Lab Results   Component Value Date    HGBA1C 5.6 09/18/2024      Continue current medications.   Take all medications as directed; compliance is critical for managing your diabetes.   Follow American Diabetic Association (ADA) dietary guidelines for eating and implement exercise into your daily regimen.   Check your glucose levels each morning and record for review at follow up.

## 2024-12-12 NOTE — ASSESSMENT & PLAN NOTE
Stable.   Continue to avoid excessive NSAID use.  Make sure to drink 6-8 glasses of water daily.      Subjective:      Patient ID: Victor Hugo Maeys is a 66 y.o. male.    Chief Complaint: Follow-up    HPI     65 yo with   Patient Active Problem List   Diagnosis    History of Helicobacter pylori infection    Hyperlipidemia    Coronary artery disease    Leg cramps    Acromioclavicular joint arthritis    Shoulder pain, bilateral    PFO (patent foramen ovale)    Essential hypertension     Past Medical History:   Diagnosis Date    Coronary artery disease     History of Helicobacter pylori infection     Hyperlipidemia      Here today for annual prev exam.  Compliant with meds without significant side effects. Energy and appetite are good.     Reports foot pain nuzhat for months. Waxes and wanes. Plantar surface. No pain at night or with sitting.     Pt also reports weak urinary stream, occ hesitancy, intermittent nocturia (average 1 to 2, worse if drink alcohol)  for several months.     Past Surgical History:   Procedure Laterality Date    ESOPHAGOGASTRODUODENOSCOPY  2012    TONSILLECTOMY       Social History     Socioeconomic History    Marital status:      Spouse name: Halima    Number of children: 3   Occupational History    Occupation: [employer] -      Employer: [employer]     Comment: [employer]   Tobacco Use    Smoking status: Former     Packs/day: 1.00     Years: 10.00     Pack years: 10.00     Types: Cigarettes     Quit date: 1/15/1986     Years since quittin.6    Smokeless tobacco: Never   Substance and Sexual Activity    Alcohol use: Yes     Alcohol/week: 6.0 standard drinks     Types: 6 Cans of beer per week    Drug use: No    Sexual activity: Yes     Partners: Female     family history includes COPD in his father; Early death in his sister; Heart disease in his brother, father, paternal uncle, and paternal uncle; Lung cancer in his father; No Known Problems in his brother and sister; Prostate cancer in his maternal uncle and maternal uncle.      Review of Systems  "  Constitutional:  Negative for chills and fever.   HENT:  Negative for ear pain and sore throat.    Respiratory:  Negative for cough.    Cardiovascular:  Negative for chest pain.   Gastrointestinal:  Negative for abdominal pain and blood in stool.   Genitourinary:  Negative for dysuria and hematuria.   Neurological:  Negative for seizures and syncope.   Objective:   /78   Pulse 66   Resp 18   Ht 5' 11" (1.803 m)   Wt 108 kg (238 lb 1.6 oz)   SpO2 97%   BMI 33.21 kg/m²     Physical Exam  Constitutional:       General: He is not in acute distress.     Appearance: He is well-developed.   HENT:      Head: Normocephalic and atraumatic.   Eyes:      Pupils: Pupils are equal, round, and reactive to light.   Neck:      Thyroid: No thyromegaly.   Cardiovascular:      Rate and Rhythm: Normal rate and regular rhythm.   Pulmonary:      Breath sounds: Normal breath sounds. No wheezing or rales.   Abdominal:      General: Bowel sounds are normal.      Palpations: Abdomen is soft.      Tenderness: There is no abdominal tenderness.   Musculoskeletal:      Cervical back: Neck supple.   Lymphadenopathy:      Cervical: No cervical adenopathy.   Skin:     General: Skin is warm and dry.   Neurological:      Mental Status: He is alert and oriented to person, place, and time.   Psychiatric:         Behavior: Behavior normal.     Prostate mildly enlarged. No nodules. No ttp.     Assessment:     1. Routine general medical examination at a health care facility    2. Mixed hyperlipidemia    3. Essential hypertension    4. Coronary artery disease involving native coronary artery of native heart without angina pectoris    5. History of cigarette smoking    6. Encounter for screening for malignant neoplasm of prostate    7. PFO (patent foramen ovale)    8. Bilateral foot pain    9. Cyst on ear    10. Nocturia      Plan:   Routine general medical examination at a health care facility  Heart healthy diet, regular exercise, and regular " use of sunscreen.    reviewed    Mixed hyperlipidemia  -     rosuvastatin (CRESTOR) 20 MG tablet; Take 1 tablet (20 mg total) by mouth once daily.  Dispense: 90 tablet; Refill: 2  -     Lipid Panel; Future; Expected date: 08/22/2022    Essential hypertension  controlled  -     valsartan (DIOVAN) 320 MG tablet; Take 1 tablet (320 mg total) by mouth once daily.  Dispense: 180 tablet; Refill: 2  -     Comprehensive Metabolic Panel; Future; Expected date: 08/22/2022  -     CBC Auto Differential; Future; Expected date: 08/22/2022  -     TSH; Future; Expected date: 08/22/2022    Coronary artery disease involving native coronary artery of native heart without angina pectoris  Up to date with cards.   History of cigarette smoking  -     US Abdominal Aorta; Future; Expected date: 08/22/2022    Encounter for screening for malignant neoplasm of prostate  -     PSA, Screening; Future; Expected date: 08/22/2022    PFO (patent foramen ovale)  Up to date with cards. On asa    Bilateral foot pain  -     Ambulatory referral/consult to Podiatry; Future; Expected date: 08/29/2022    Cyst on ear  chronic  -     Ambulatory referral/consult to Dermatology; Future; Expected date: 08/29/2022    Nocturia  -     tamsulosin (FLOMAX) 0.4 mg Cap; Take 1 capsule (0.4 mg total) by mouth once daily.  Dispense: 90 capsule; Refill: 1          Problem List Items Addressed This Visit       Hyperlipidemia    Relevant Medications    rosuvastatin (CRESTOR) 20 MG tablet    Other Relevant Orders    Lipid Panel (Completed)    Coronary artery disease    Overview     Elevated cor artery calcium score.          PFO (patent foramen ovale)    Overview     Cards advised aspirin daily         Essential hypertension    Relevant Medications    valsartan (DIOVAN) 320 MG tablet    Other Relevant Orders    Comprehensive Metabolic Panel (Completed)    CBC Auto Differential (Completed)    TSH (Completed)     Other Visit Diagnoses       Routine general medical  examination at a health care facility    -  Primary    History of cigarette smoking        Relevant Orders    US Abdominal Aorta    Encounter for screening for malignant neoplasm of prostate        Relevant Orders    PSA, Screening (Completed)    Bilateral foot pain        Relevant Orders    Ambulatory referral/consult to Podiatry    Cyst on ear        Relevant Orders    Ambulatory referral/consult to Dermatology    Nocturia        Relevant Medications    tamsulosin (FLOMAX) 0.4 mg Cap            Follow up in about 6 months (around 2/22/2023), or if symptoms worsen or fail to improve.  Fasting labs today.     Wants to See dr. Calderon for derm.

## 2024-12-12 NOTE — ASSESSMENT & PLAN NOTE
Do not use NSAIDs regularly; try Tylenol Arthritis instead and reserve Ibuprofen for more severe episodes of pain

## 2024-12-12 NOTE — PROGRESS NOTES
"Patient ID: Dasha Curiel  : 1970    Chief Complaint: Annual Exam    Allergies: Patient is allergic to iodine, meperidine, and promethazine.     History of Present Illness:  The patient is a 54 y.o. Black or  female who presents to clinic for annual wellness visit.      Feeling well in general, no health concerns or issues to discuss today.     She is doing well on Ozempic; her A1c was 5.6%. she has lost 25# over the last year.     Cholesterol at goal when last checked.     Past Medical History:  has a past medical history of Anxiety, CHF (congestive heart failure), CKD (chronic kidney disease), Depression, Diabetes mellitus, type 2, DM (diabetes mellitus), Elevated antinuclear antibody (LLOYD) level, Gout, unspecified, Hypertension, Insomnia, Lumbar radiculopathy, right, and Neuropathy.    Surgical History:  has a past surgical history that includes Insertion of generator for single chamber implantable cardioverter-defibrillator (ICD) (2020); Cardiac catheterization;  section;  section;  section; Cholecystectomy; Hysterectomy; and Insertion of pacemaker.    Family History: family history includes No Known Problems in her brother, cousin, father, maternal aunt, maternal grandfather, maternal grandmother, maternal uncle, mother, paternal aunt, paternal grandfather, paternal grandmother, paternal uncle, sister, and another family member.    Social History:  reports that she has never smoked. She has never been exposed to tobacco smoke. She has never used smokeless tobacco. She reports that she does not currently use alcohol. She reports that she does not currently use drugs after having used the following drugs: "Crack" cocaine.    Care Team: Patient Care Team:  Zulma Zhang, JESUSP-C as PCP - General (Family Medicine)  Brendan Case PMHNP as Nurse Practitioner (Psychiatry)  Tae Dominguez MD as Consulting Physician (Cardiology)     Current " Medications:  Current Outpatient Medications   Medication Instructions    ADULT LOW DOSE ASPIRIN 81 mg, Nightly    ALPRAZolam (XANAX) 0.5 mg, Oral, 3 times daily PRN    amLODIPine (NORVASC) 10 mg, Oral    atorvastatin (LIPITOR) 20 mg, Oral    blood sugar diagnostic Strp To check BG 1 times daily, to use with insurance preferred meter    blood sugar diagnostic Strp To check BG one time daily, to use with insurance preferred meter    carvediloL (COREG) 12.5 mg, Oral, 2 times daily    cetirizine (ZYRTEC) 10 mg, Daily PRN    clobetasoL (TEMOVATE) 0.05 % cream Topical (Top), 2 times daily    cyclobenzaprine (FLEXERIL) 10 mg, Oral, Nightly    DULoxetine (CYMBALTA) 60 mg, Oral, Daily    fluticasone propionate (FLONASE) 50 mcg, Each Nostril, 2 times daily    gabapentin (NEURONTIN) 400 MG capsule TAKE 1 CAPSULE BY MOUTH EVERY MORNING AND 2 CAPSULES AT BEDTIME    ibuprofen (ADVIL,MOTRIN) 800 mg, Oral, Every 6 hours PRN    levETIRAcetam (KEPPRA) 500 mg, Oral, 2 times daily    metFORMIN (GLUCOPHAGE-XR) 1,000 mg, Oral, Nightly    nitroGLYCERIN (NITROSTAT) 0.4 MG SL tablet 1 TABLET UNDER TONGUE EVERY  5 MINUTES NO MORE THAN 3 TABS THEN GO TO ER FOR CHEST PAIN    nystatin (MYCOSTATIN) ointment APPLY TO AFFECTED AREA TWO TIMES A DAY    semaglutide (OZEMPIC) 1 mg/dose (4 mg/3 mL) INJECT 1MG INTO SKIN EVERY 7 DAYS    TRUE METRIX GLUCOSE METER Misc 1 each, Daily    TRUEPLUS LANCETS 33 gauge Misc 1 lancet , Daily    zolpidem (AMBIEN) 10 mg, Oral, Nightly PRN       Review of Systems   Constitutional:  Negative for activity change, appetite change, fatigue and unexpected weight change.   HENT:  Negative for ear pain and hearing loss.    Eyes:  Negative for visual disturbance.   Respiratory:  Negative for apnea, cough, chest tightness, shortness of breath and wheezing.    Cardiovascular:  Negative for chest pain, palpitations, leg swelling and claudication.   Gastrointestinal:  Negative for abdominal pain, anal bleeding, blood in stool,  "change in bowel habit, nausea, vomiting and reflux.   Endocrine: Negative for cold intolerance, heat intolerance, polydipsia, polyphagia and polyuria.   Genitourinary:  Negative for dysuria, frequency, hematuria and urgency.   Musculoskeletal:  Negative for arthralgias.   Integumentary:  Negative for rash and mole/lesion.   Allergic/Immunologic: Negative for environmental allergies.   Neurological:  Negative for dizziness, headaches and memory loss.        Visit Vitals  /82 (BP Location: Right arm, Patient Position: Sitting)   Pulse 88   Temp 96.8 °F (36 °C) (Temporal)   Ht 5' 2.01" (1.575 m)   Wt 71.2 kg (157 lb)   SpO2 95%   BMI 28.71 kg/m²       Physical Exam  Vitals reviewed.   Constitutional:       Appearance: Normal appearance. She is normal weight.   Eyes:      Conjunctiva/sclera: Conjunctivae normal.   Cardiovascular:      Rate and Rhythm: Normal rate and regular rhythm.      Pulses: Normal pulses.      Heart sounds: Normal heart sounds.   Pulmonary:      Effort: Pulmonary effort is normal.      Breath sounds: Normal breath sounds.   Abdominal:      General: Bowel sounds are normal.      Palpations: Abdomen is soft.   Musculoskeletal:      Cervical back: Neck supple.   Skin:     General: Skin is warm and dry.      Capillary Refill: Capillary refill takes less than 2 seconds.   Neurological:      Mental Status: She is alert and oriented to person, place, and time.   Psychiatric:         Mood and Affect: Mood normal.         Behavior: Behavior normal.          Labs Reviewed:  Chemistry:  Lab Results   Component Value Date     09/18/2024    K 3.9 09/18/2024    BUN 17 09/18/2024    CREATININE 0.87 09/18/2024    EGFRNORACEVR 80 09/18/2024    GLUCOSE 82 09/18/2024    CALCIUM 10.9 (H) 09/18/2024    ALKPHOS 80 09/18/2024    LABPROT 7.6 09/18/2024    ALBUMIN 4.7 09/18/2024    BILIDIR 0.10 05/24/2019    IBILI 0.30 05/24/2019    AST 23 09/18/2024    ALT 18 09/18/2024    MG 2.5 (H) 05/23/2019    TSH 1.120 " 04/03/2024        Lab Results   Component Value Date    HGBA1C 5.6 09/18/2024        Hematology:  Lab Results   Component Value Date    WBC 10.24 04/03/2024    RBC 4.51 04/03/2024    HGB 12.9 04/03/2024    HCT 38.8 04/03/2024    MCV 86.0 04/03/2024    MCH 28.6 04/03/2024    MCHC 33.2 04/03/2024    RDW 11.9 04/03/2024     04/03/2024    MPV 10.6 04/03/2024       Lipid Panel:  Lab Results   Component Value Date    CHOL 135 04/03/2024    HDL 78 (H) 04/03/2024    LDLDIRECT 43.5 04/03/2024    TRIG 132 04/03/2024    TOTALCHOLEST 3.3 05/24/2019        Assessment & Plan:  1. Encounter for well adult exam with abnormal findings  -     Hemoglobin A1C; Future; Expected date: 03/12/2025  -     Comprehensive Metabolic Panel; Future; Expected date: 03/12/2025  -     CBC Auto Differential; Future; Expected date: 12/12/2025  -     Comprehensive Metabolic Panel; Future; Expected date: 12/12/2025  -     Lipid Panel; Future; Expected date: 12/12/2025  -     TSH; Future; Expected date: 12/12/2025    2. Type 2 diabetes mellitus without complication, without long-term current use of insulin  Overview:  A1c 6.8% (08/2022)  Remains on Trulicity 0.75 mg weekly, Metformin 1000 mg, and Jardiance 25 mg daily.  Stop Trulicity and Start Ozempic (11/21/23)    Assessment & Plan:  Diabetes labs:   Lab Results   Component Value Date    HGBA1C 5.6 09/18/2024      Continue current medications.   Take all medications as directed; compliance is critical for managing your diabetes.   Follow American Diabetic Association (ADA) dietary guidelines for eating and implement exercise into your daily regimen.   Check your glucose levels each morning and record for review at follow up.      Orders:  -     Hemoglobin A1C; Future; Expected date: 12/12/2025  -     Microalbumin/Creatinine Ratio, Urine; Future; Expected date: 12/12/2025    3. Primary hypertension  Overview:  Coreg 12.5 mg b.i.d., amlodipine 10 mg daily    Assessment & Plan:  Well controlled.    Continue current regimen.   Low Sodium Diet (DASH Diet - Less than 2 grams of sodium per day).  Monitor blood pressure daily and log. Report consistent numbers greater than 140/90.  Maintain healthy weight with goal BMI <30. Exercise 30 minutes per day, 5 days per week.          4. Mixed hyperlipidemia  Overview:  Atorvastatin 20 mg daily    Assessment & Plan:  Lab Results   Component Value Date    CHOL 135 04/03/2024    HDL 78 (H) 04/03/2024    LDLDIRECT 43.5 04/03/2024    TRIG 132 04/03/2024    TOTALCHOLEST 3.3 05/24/2019     Stable. Continue current therapy.  LDL Goal: 100  Educated on dietary modifications. Follow a low cholesterol, low saturated fat diet with less that 200mg of cholesterol a day.  Avoid fried foods and high saturated fats.  Increase dietary fiber.  Regular exercise can reduce LDL (bad cholesterol) and raise HDL (good cholesterol). Encourage physical activity 5 times per week for 30 minutes per day.       Orders:  -     Lipid Panel; Future; Expected date: 03/12/2025    5. Generalized anxiety disorder  Overview:  Xanax 0.5 mg TID prn (per Psych provider)    Assessment & Plan:  Stable; continue current therapy.         6. Cutaneous sarcoidosis  Overview:  Referral to Derm sent 12/2022      7. Chronic systolic congestive heart failure  Overview:  On a calcium channel blocker, beta-blocker, and aspirin.    Assessment & Plan:  Stable; continue current therapy.         8. Stage 2 chronic kidney disease  Assessment & Plan:  Stable.   Continue to avoid excessive NSAID use.  Make sure to drink 6-8 glasses of water daily.         9. Neuropathy  Overview:  Gabapentin 400 mg Qam and 800 mg Qpm    Assessment & Plan:  Stable; continue current therapy.         10. Seizure disorder  Overview:  Keppra 500 mg BID    Assessment & Plan:  Stable; continue current therapy.         11. Recurrent major depressive disorder, in full remission  Overview:  Cymbalta 60 mg daily (per psych provider)      12. Primary  insomnia  Overview:  Ambien 10 mg Qhs prn    Assessment & Plan:  Stable; continue current therapy.         13. Arthralgia of both ankles  Overview:  Ibuprofen only when absolutely needed.    Assessment & Plan:  Do not use NSAIDs regularly; try Tylenol Arthritis instead and reserve Ibuprofen for more severe episodes of pain    Orders:  -     ibuprofen (ADVIL,MOTRIN) 800 MG tablet; Take 1 tablet (800 mg total) by mouth every 6 (six) hours as needed for Pain.  Dispense: 30 tablet; Refill: 2    14. BMI 28.0-28.9,adult         Cervical Cancer Screening-  Breast Cancer Screening-order in ; patient declines mammogram  Osteoporosis Screening-  Colon Cancer Screening - UTD   Eye Exam- Recommend annually.  Dental Exam- Recommend biannually.    Vaccinations:  Immunization History   Administered Date(s) Administered    COVID-19 Vaccine 08/20/2021, 09/29/2021    COVID-19, MRNA, LN-S, PF (MODERNA FULL 0.5 ML DOSE) 08/20/2021, 09/29/2021    Influenza 10/16/2015    Influenza - Quadrivalent 11/16/2018, 02/03/2021, 02/16/2022    Influenza - Quadrivalent - PF *Preferred* (6 months and older) 09/18/2019, 02/16/2022    Influenza - Trivalent - Fluarix, Flulaval, Fluzone, Afluria - PF 10/16/2015, 11/16/2018, 09/18/2019    Pneumococcal Conjugate - 13 Valent 02/16/2022    Pneumococcal Polysaccharide - 23 Valent 12/07/2018       Future Appointments   Date Time Provider Department Center   12/27/2024  1:30 PM Brendan Case PMMICKEY [facility] Devaugh[facility]   4/4/2025  9:00 AM LAB, [facility] LABORATORY DRAW STATION [facility] J[place]N [place] [facility]   4/11/2025  1:00 PM Zulma Zhang FNP-C JER [facility]RA [place] [facility]   12/11/2025  8:00 AM LAB, [facility] LABORATORY DRAW STATION [facility] [place] [facility]   12/18/2025  1:30 PM Zulma Zhang FNP-C [facility] [place] [facility]       Follow up for 1) 4 month f/u diabetes, fasting prior , 2), 1 year, Wellness, Fasting Labs prior. Call sooner if needed.    Zulma Zhang, JESUSP-C    Lab Frequency Next Occurrence    Mammo Digital Screening Bilat w/ Jayesh Once 03/27/2023   Ambulatory referral/consult to Ophthalmology Once 05/02/2024   Ambulatory referral/consult to Family Med COLONOSCOPY Once 05/02/2024

## 2024-12-12 NOTE — ASSESSMENT & PLAN NOTE

## 2024-12-12 NOTE — ASSESSMENT & PLAN NOTE
Lab Results   Component Value Date    CHOL 135 04/03/2024    HDL 78 (H) 04/03/2024    LDLDIRECT 43.5 04/03/2024    TRIG 132 04/03/2024    TOTALCHOLEST 3.3 05/24/2019     Stable. Continue current therapy.  LDL Goal: 100  Educated on dietary modifications. Follow a low cholesterol, low saturated fat diet with less that 200mg of cholesterol a day.  Avoid fried foods and high saturated fats.  Increase dietary fiber.  Regular exercise can reduce LDL (bad cholesterol) and raise HDL (good cholesterol). Encourage physical activity 5 times per week for 30 minutes per day.

## 2024-12-27 ENCOUNTER — OFFICE VISIT (OUTPATIENT)
Dept: BEHAVIORAL HEALTH | Facility: CLINIC | Age: 54
End: 2024-12-27
Payer: MEDICAID

## 2024-12-27 ENCOUNTER — TELEPHONE (OUTPATIENT)
Dept: BEHAVIORAL HEALTH | Facility: CLINIC | Age: 54
End: 2024-12-27
Payer: MEDICAID

## 2024-12-27 VITALS
HEART RATE: 73 BPM | WEIGHT: 155.19 LBS | BODY MASS INDEX: 28.56 KG/M2 | SYSTOLIC BLOOD PRESSURE: 136 MMHG | OXYGEN SATURATION: 94 % | DIASTOLIC BLOOD PRESSURE: 86 MMHG | HEIGHT: 62 IN | TEMPERATURE: 98 F

## 2024-12-27 DIAGNOSIS — F33.9 EPISODE OF RECURRENT MAJOR DEPRESSIVE DISORDER, UNSPECIFIED DEPRESSION EPISODE SEVERITY: Primary | ICD-10-CM

## 2024-12-27 DIAGNOSIS — J30.2 SEASONAL ALLERGIES: Primary | ICD-10-CM

## 2024-12-27 DIAGNOSIS — F41.1 GENERALIZED ANXIETY DISORDER: ICD-10-CM

## 2024-12-27 DIAGNOSIS — G47.00 INSOMNIA, UNSPECIFIED TYPE: ICD-10-CM

## 2024-12-27 RX ORDER — CETIRIZINE HYDROCHLORIDE 10 MG/1
10 TABLET ORAL DAILY PRN
Qty: 30 TABLET | Refills: 2 | Status: SHIPPED | OUTPATIENT
Start: 2024-12-27

## 2024-12-27 RX ORDER — DULOXETIN HYDROCHLORIDE 60 MG/1
60 CAPSULE, DELAYED RELEASE ORAL DAILY
Qty: 30 CAPSULE | Refills: 1 | Status: SHIPPED | OUTPATIENT
Start: 2024-12-27

## 2024-12-27 RX ORDER — ZOLPIDEM TARTRATE 10 MG/1
10 TABLET ORAL NIGHTLY PRN
Qty: 30 TABLET | Refills: 1 | Status: SHIPPED | OUTPATIENT
Start: 2024-12-27

## 2024-12-27 RX ORDER — ALPRAZOLAM 0.5 MG/1
0.5 TABLET ORAL 3 TIMES DAILY PRN
Qty: 90 TABLET | Refills: 1 | Status: SHIPPED | OUTPATIENT
Start: 2024-12-27

## 2024-12-27 NOTE — PROGRESS NOTES
"PSYCHIATRIC FOLLOW-UP VISIT NOTE    Chief Complaint   Patient presents with    Medication Management     Medication management         History of Present Illness  54 y.o. year old Black or  female with hx of MDD, scot, and insomnia seen today for follow-up appointment and medication management.  Patient reports that she has been doing well since our last visit.  Denies any recent depression.  Anxiety has been very minimal and at a level she is able to cope with effectively.  She was able to spend the holiday season with her family and friends and enjoyed them with minimal discord.  Discord that was present, was still between patient and her daughter.  Daughter seems to exhibit multiple symptoms of borderline personality disorder and can be very manipulative and mean spirited towards Dasha.  She denies any abuse or neglect.  Reports good efficacy with current medication regimen.  Denies any side effects. Patient denies SI/HI. Denies hallucinations and does not appear to be responding to internal stimuli or be internally preoccupied. No manic symptoms noted.       Objective:     Vitals:  Vitals:    12/27/24 1042   BP: 136/86   BP Location: Right arm   Patient Position: Sitting   Pulse: 73   Temp: 97.9 °F (36.6 °C)   TempSrc: Temporal   SpO2: (!) 94%   Weight: 70.4 kg (155 lb 3.3 oz)   Height: 5' 2.01" (1.575 m)       Wt Readings from Last 3 Encounters:   12/27/24 1042 70.4 kg (155 lb 3.3 oz)   12/12/24 1438 71.2 kg (157 lb)   10/28/24 1346 71.7 kg (158 lb 1.1 oz)         Medication:    Current Outpatient Medications:     ADULT LOW DOSE ASPIRIN 81 mg EC tablet, Take 81 mg by mouth nightly., Disp: , Rfl:     amLODIPine (NORVASC) 10 MG tablet, TAKE 1 TABLET BY MOUTH EVERY DAY, Disp: 90 tablet, Rfl: 0    atorvastatin (LIPITOR) 20 MG tablet, TAKE 1 TABLET BY MOUTH EVERY DAY, Disp: 90 tablet, Rfl: 0    blood sugar diagnostic Strp, To check BG 1 times daily, to use with insurance preferred meter, Disp: 30 " each, Rfl: 0    blood sugar diagnostic Strp, To check BG one time daily, to use with insurance preferred meter, Disp: 35 each, Rfl: 11    carvediloL (COREG) 12.5 MG tablet, Take 1 tablet (12.5 mg total) by mouth 2 (two) times daily., Disp: 60 tablet, Rfl: 11    cetirizine (ZYRTEC) 10 MG tablet, Take 10 mg by mouth daily as needed., Disp: , Rfl:     clobetasoL (TEMOVATE) 0.05 % cream, Apply topically 2 (two) times daily., Disp: 60 g, Rfl: 11    cyclobenzaprine (FLEXERIL) 10 MG tablet, Take 1 tablet (10 mg total) by mouth every evening., Disp: 30 tablet, Rfl: 5    fluticasone propionate (FLONASE) 50 mcg/actuation nasal spray, 1 spray (50 mcg total) by Each Nostril route 2 (two) times daily., Disp: 11.1 mL, Rfl: 5    gabapentin (NEURONTIN) 400 MG capsule, TAKE 1 CAPSULE BY MOUTH EVERY MORNING AND 2 CAPSULES AT BEDTIME, Disp: 270 capsule, Rfl: 3    ibuprofen (ADVIL,MOTRIN) 800 MG tablet, Take 1 tablet (800 mg total) by mouth every 6 (six) hours as needed for Pain., Disp: 30 tablet, Rfl: 2    levETIRAcetam (KEPPRA) 500 MG Tab, TAKE 1 TABLET BY MOUTH TWO TIMES A DAY, Disp: 180 tablet, Rfl: 3    metFORMIN (GLUCOPHAGE-XR) 500 MG ER 24hr tablet, Take 2 tablets (1,000 mg total) by mouth every evening., Disp: 60 tablet, Rfl: 1    nitroGLYCERIN (NITROSTAT) 0.4 MG SL tablet, 1 TABLET UNDER TONGUE EVERY  5 MINUTES NO MORE THAN 3 TABS THEN GO TO ER FOR CHEST PAIN, Disp: 25 tablet, Rfl: 2    nystatin (MYCOSTATIN) ointment, APPLY TO AFFECTED AREA TWO TIMES A DAY, Disp: 30 g, Rfl: 2    semaglutide (OZEMPIC) 1 mg/dose (4 mg/3 mL), INJECT 1MG INTO SKIN EVERY 7 DAYS, Disp: 3 mL, Rfl: 1    TRUE METRIX GLUCOSE METER Misc, Inject 1 each into the skin once daily., Disp: , Rfl:     TRUEPLUS LANCETS 33 gauge Misc, Apply 1 lancet  topically once daily., Disp: , Rfl:     ALPRAZolam (XANAX) 0.5 MG tablet, Take 1 tablet (0.5 mg total) by mouth 3 (three) times daily as needed for Anxiety., Disp: 90 tablet, Rfl: 1    DULoxetine (CYMBALTA) 60 MG  "capsule, Take 1 capsule (60 mg total) by mouth Daily., Disp: 30 capsule, Rfl: 1    zolpidem (AMBIEN) 10 mg Tab, Take 1 tablet (10 mg total) by mouth nightly as needed (insomnia)., Disp: 30 tablet, Rfl: 1       Significant Labs: - none at this time    Significant Imaging: - none at this time    Physical Exam  Vitals and nursing note reviewed.   Constitutional:       General: She is awake.      Appearance: Normal appearance.   Musculoskeletal:      Comments: Full ROM   Neurological:      Mental Status: She is alert.   Psychiatric:         Attention and Perception: Attention and perception normal. She does not perceive auditory or visual hallucinations.         Mood and Affect: Affect normal.         Speech: Speech normal.         Behavior: Behavior is cooperative.         Thought Content: Thought content does not include homicidal or suicidal ideation.         Cognition and Memory: Cognition and memory normal.          Review of Systems     Mental Status Exam:  Presentation:  - Appearance: 54 y.o. year old Black or  female, appears stated age, appears Casually dressed and Well groomed  - Motility: Erect when standing, Steady gait, No EPS or Tremors, No psychomotor agitation or retardation appreciated  - Behavior: calm, cooperative, good eye contact  Speech:  - Character/Organization: spontaneous, fluent, normal volume, normal rate, normal rhythm  Emotional State:  - Mood: "happy"   - Affect: congruent and appropriate  Thought:  - Process: logical, linear, organized , goal-directed  - Preoccupations: no ruminations, rituals, or phobias appreciated  - Delusions: no persecutory, paranoid, or grandiose delusions appreciated  - Perception: denies AVH, not actively responding to internal stimuli  - SI/HI: denies/denies  Sensorium & Intellect:  - Sensorium: AAOx4  - Memory: intact to recent and remote events  - Attention/Concentration: good/good  - Insight/Judgement: good/good    Gait: normal swing and " stance  MSK:no rigidity appreciated    All other systems without acute issues unless noted in HPI      Assessment/Plan      ICD-10-CM ICD-9-CM    1. Episode of recurrent major depressive disorder, unspecified depression episode severity  F33.9 296.30 DULoxetine (CYMBALTA) 60 MG capsule      2. Generalized anxiety disorder  F41.1 300.02 DULoxetine (CYMBALTA) 60 MG capsule      ALPRAZolam (XANAX) 0.5 MG tablet      3. Insomnia, unspecified type  G47.00 780.52 zolpidem (AMBIEN) 10 mg Tab         Continue current medications without change     checked. Results as expected. No suspected diversion or abuse of controlled substances.    Potential side effects and risks vs benefits of current treatment plan reviewed with patient. Applicable black box warnings reviewed. Encouraged patient not to alter dosages or abruptly discontinue medications without contacting prescriber first, due to risk of worsening symptoms and decompensation of mental status. Warned of risks associated with herbal remedies and supplements while taking psychotropic medications and of the need to consult prescriber prior to adding any of these to current regimen. Patient should abstain from abuse of alcohol, prescription medications, and illicit drugs. Reviewed when to contact clinic and/or seek emergent care, such as but not limited to, onset/worsening SI/HI, hallucinations, delusions, manic symptoms. Pt verbalized understanding and agreement of these warnings/recommendations and verbally consented to treatment plan.      Follow up in about 2 months (around 2/27/2025) for Medication Management.        Brendan Case, JUSTICEP

## 2024-12-31 DIAGNOSIS — E11.9 TYPE 2 DIABETES MELLITUS WITHOUT COMPLICATION, WITHOUT LONG-TERM CURRENT USE OF INSULIN: ICD-10-CM

## 2025-01-02 RX ORDER — SEMAGLUTIDE 1.34 MG/ML
INJECTION, SOLUTION SUBCUTANEOUS
Qty: 3 ML | Refills: 1 | Status: SHIPPED | OUTPATIENT
Start: 2025-01-02

## 2025-01-17 DIAGNOSIS — E11.9 TYPE 2 DIABETES MELLITUS WITHOUT COMPLICATION, WITHOUT LONG-TERM CURRENT USE OF INSULIN: ICD-10-CM

## 2025-01-17 RX ORDER — METFORMIN HYDROCHLORIDE 500 MG/1
1000 TABLET, EXTENDED RELEASE ORAL NIGHTLY
Qty: 60 TABLET | Refills: 2 | Status: SHIPPED | OUTPATIENT
Start: 2025-01-17

## 2025-01-28 ENCOUNTER — TELEPHONE (OUTPATIENT)
Dept: FAMILY MEDICINE | Facility: CLINIC | Age: 55
End: 2025-01-28
Payer: MEDICAID

## 2025-01-28 NOTE — TELEPHONE ENCOUNTER
Called patient and explained to her that insurance will only pay for 15 ambien per month that she will have to pay cash for the other 15 per month. Patient verbalized understanding.

## 2025-01-29 DIAGNOSIS — M54.16 LUMBAR RADICULOPATHY: ICD-10-CM

## 2025-01-29 RX ORDER — NITROGLYCERIN 0.4 MG/1
TABLET SUBLINGUAL
Qty: 25 TABLET | Refills: 2 | Status: SHIPPED | OUTPATIENT
Start: 2025-01-29

## 2025-02-17 DIAGNOSIS — E78.5 HYPERLIPIDEMIA, UNSPECIFIED HYPERLIPIDEMIA TYPE: ICD-10-CM

## 2025-02-17 DIAGNOSIS — I10 HYPERTENSION, UNSPECIFIED TYPE: ICD-10-CM

## 2025-02-17 RX ORDER — AMLODIPINE BESYLATE 10 MG/1
10 TABLET ORAL
Qty: 90 TABLET | Refills: 0 | Status: SHIPPED | OUTPATIENT
Start: 2025-02-17

## 2025-02-17 RX ORDER — ATORVASTATIN CALCIUM 20 MG/1
20 TABLET, FILM COATED ORAL
Qty: 90 TABLET | Refills: 0 | Status: SHIPPED | OUTPATIENT
Start: 2025-02-17

## 2025-02-24 ENCOUNTER — TELEPHONE (OUTPATIENT)
Dept: FAMILY MEDICINE | Facility: CLINIC | Age: 55
End: 2025-02-24
Payer: MEDICAID

## 2025-02-24 NOTE — TELEPHONE ENCOUNTER
She has a refill on her current prescription that she has.  She will have to check with the pharmacy and see when they will refill it.

## 2025-02-24 NOTE — TELEPHONE ENCOUNTER
Patient requesting a refill on xanax 0.5mg. Last filled on 1/28/25 a quantity of 90 for a 30 day supply. Xanax 0.5mg is ordered TID PRN for anxiety. Her last apt with [facility] was on 12/28/24. Her next apt with [facility] is on 2/27/25. Please advise.

## 2025-02-24 NOTE — TELEPHONE ENCOUNTER
I called and notified pt. she verbalized understanding. She said the pharmacy told her that she can fill it tomorrow

## 2025-02-25 DIAGNOSIS — F33.9 EPISODE OF RECURRENT MAJOR DEPRESSIVE DISORDER, UNSPECIFIED DEPRESSION EPISODE SEVERITY: ICD-10-CM

## 2025-02-25 DIAGNOSIS — F41.1 GENERALIZED ANXIETY DISORDER: ICD-10-CM

## 2025-02-25 RX ORDER — DULOXETIN HYDROCHLORIDE 60 MG/1
60 CAPSULE, DELAYED RELEASE ORAL
Qty: 30 CAPSULE | Refills: 1 | Status: SHIPPED | OUTPATIENT
Start: 2025-02-25 | End: 2025-02-27 | Stop reason: SDUPTHER

## 2025-02-26 DIAGNOSIS — E11.9 TYPE 2 DIABETES MELLITUS WITHOUT COMPLICATION, WITHOUT LONG-TERM CURRENT USE OF INSULIN: ICD-10-CM

## 2025-02-26 RX ORDER — SEMAGLUTIDE 1.34 MG/ML
INJECTION, SOLUTION SUBCUTANEOUS
Qty: 3 ML | Refills: 0 | Status: SHIPPED | OUTPATIENT
Start: 2025-02-26

## 2025-02-27 ENCOUNTER — OFFICE VISIT (OUTPATIENT)
Dept: BEHAVIORAL HEALTH | Facility: CLINIC | Age: 55
End: 2025-02-27
Payer: MEDICAID

## 2025-02-27 VITALS
OXYGEN SATURATION: 98 % | TEMPERATURE: 98 F | HEART RATE: 85 BPM | DIASTOLIC BLOOD PRESSURE: 102 MMHG | BODY MASS INDEX: 27.55 KG/M2 | WEIGHT: 149.69 LBS | HEIGHT: 62 IN | SYSTOLIC BLOOD PRESSURE: 140 MMHG

## 2025-02-27 DIAGNOSIS — F41.1 GENERALIZED ANXIETY DISORDER: ICD-10-CM

## 2025-02-27 DIAGNOSIS — F33.9 EPISODE OF RECURRENT MAJOR DEPRESSIVE DISORDER, UNSPECIFIED DEPRESSION EPISODE SEVERITY: Primary | ICD-10-CM

## 2025-02-27 DIAGNOSIS — G47.00 INSOMNIA, UNSPECIFIED TYPE: ICD-10-CM

## 2025-02-27 DIAGNOSIS — M25.562 ACUTE PAIN OF LEFT KNEE: Primary | ICD-10-CM

## 2025-02-27 PROCEDURE — 3080F DIAST BP >= 90 MM HG: CPT | Mod: CPTII,,, | Performed by: NURSE PRACTITIONER

## 2025-02-27 PROCEDURE — 3075F SYST BP GE 130 - 139MM HG: CPT | Mod: CPTII,,, | Performed by: NURSE PRACTITIONER

## 2025-02-27 PROCEDURE — 99214 OFFICE O/P EST MOD 30 MIN: CPT | Mod: ,,, | Performed by: NURSE PRACTITIONER

## 2025-02-27 PROCEDURE — 3008F BODY MASS INDEX DOCD: CPT | Mod: CPTII,,, | Performed by: NURSE PRACTITIONER

## 2025-02-27 PROCEDURE — 1160F RVW MEDS BY RX/DR IN RCRD: CPT | Mod: CPTII,,, | Performed by: NURSE PRACTITIONER

## 2025-02-27 PROCEDURE — 1159F MED LIST DOCD IN RCRD: CPT | Mod: CPTII,,, | Performed by: NURSE PRACTITIONER

## 2025-02-27 RX ORDER — DULOXETIN HYDROCHLORIDE 60 MG/1
60 CAPSULE, DELAYED RELEASE ORAL DAILY
Qty: 30 CAPSULE | Refills: 1 | Status: SHIPPED | OUTPATIENT
Start: 2025-02-27

## 2025-02-27 RX ORDER — ZOLPIDEM TARTRATE 10 MG/1
10 TABLET ORAL NIGHTLY PRN
Qty: 30 TABLET | Refills: 1 | Status: SHIPPED | OUTPATIENT
Start: 2025-02-27

## 2025-02-27 RX ORDER — ALPRAZOLAM 0.5 MG/1
0.5 TABLET ORAL 3 TIMES DAILY PRN
Qty: 90 TABLET | Refills: 1 | Status: SHIPPED | OUTPATIENT
Start: 2025-02-27

## 2025-02-27 NOTE — PROGRESS NOTES
"PSYCHIATRIC FOLLOW-UP VISIT NOTE    Chief Complaint   Patient presents with    Medication Management     Medication management         History of Present Illness  55 y.o. year old Black or  female with hx of MDD, scot, and insomnia seen today for follow-up appointment and medication management.  Patient reports that she has been doing well for the most part since our last visit.  Denies any recent depression.  Has had some minimal anxiety, and this is primarily due to her daughter recently having an appendectomy.  This daughter lives with the patient and she has been her primary caretaker during the recovery period.  Her daughter did returned to work today.  She has also had some stress due to her ex- visiting some family in [place].  She states she in general does not like whenever he is in town.  Overall she is without any acute complaints today.  Has been sleeping well at night and feels rested in the morning.  She also denies any signs or symptoms of acute hypertension today.  Has been having some left knee pain after hitting her leg on a exterior door recently.  She will have that x-rays following today's visit. Patient denies SI/HI. Denies hallucinations and does not appear to be responding to internal stimuli or be internally preoccupied. No manic symptoms noted.       Objective:     Vitals:  Vitals:    02/27/25 1043 02/27/25 1048   BP: (!) 136/98 (!) 140/102   BP Location: Right arm    Patient Position: Sitting    Pulse: 85    Temp: 97.5 °F (36.4 °C)    TempSrc: Temporal    SpO2: 98%    Weight: 67.9 kg (149 lb 11.1 oz)    Height: 5' 2.01" (1.575 m)        Wt Readings from Last 3 Encounters:   02/27/25 1043 67.9 kg (149 lb 11.1 oz)   12/27/24 1042 70.4 kg (155 lb 3.3 oz)   12/12/24 1438 71.2 kg (157 lb)         Medication:  Current Medications[1]       Significant Labs: - none at this time    Significant Imaging: - none at this time    Physical Exam  Vitals and nursing note reviewed. " "  Constitutional:       General: She is awake.      Appearance: Normal appearance.   Musculoskeletal:      Comments: Full ROM   Neurological:      Mental Status: She is alert.   Psychiatric:         Attention and Perception: Attention and perception normal. She does not perceive auditory or visual hallucinations.         Mood and Affect: Affect normal.         Speech: Speech normal.         Behavior: Behavior is cooperative.         Thought Content: Thought content does not include homicidal or suicidal ideation.         Cognition and Memory: Cognition and memory normal.          Review of Systems     Mental Status Exam:  Presentation:  - Appearance: 55 y.o. year old Black or  female, appears stated age, appears Casually dressed and Well groomed  - Motility: Erect when standing, Steady gait, No EPS or Tremors, No psychomotor agitation or retardation appreciated  - Behavior: calm, cooperative, good eye contact  Speech:  - Character/Organization: spontaneous, fluent, normal volume, normal rate, normal rhythm  Emotional State:  - Mood: "happy"   - Affect: congruent and appropriate  Thought:  - Process: logical, linear, organized , goal-directed  - Preoccupations: no ruminations, rituals, or phobias appreciated  - Delusions: no persecutory, paranoid, or grandiose delusions appreciated  - Perception: denies AVH, not actively responding to internal stimuli  - SI/HI: denies/denies  Sensorium & Intellect:  - Sensorium: AAOx4  - Memory: intact to recent and remote events  - Attention/Concentration: good/good  - Insight/Judgement: good/good    Gait: normal swing and stance  MSK:no rigidity appreciated    All other systems without acute issues unless noted in HPI      Assessment/Plan      ICD-10-CM ICD-9-CM    1. Episode of recurrent major depressive disorder, unspecified depression episode severity  F33.9 296.30 DULoxetine (CYMBALTA) 60 MG capsule      2. Generalized anxiety disorder  F41.1 300.02 ALPRAZolam " (XANAX) 0.5 MG tablet      DULoxetine (CYMBALTA) 60 MG capsule      3. Insomnia, unspecified type  G47.00 780.52 zolpidem (AMBIEN) 10 mg Tab         Continue current medications without change     checked. Results as expected. No suspected diversion or abuse of controlled substances.    Potential side effects and risks vs benefits of current treatment plan reviewed with patient. Applicable black box warnings reviewed. Encouraged patient not to alter dosages or abruptly discontinue medications without contacting prescriber first, due to risk of worsening symptoms and decompensation of mental status. Warned of risks associated with herbal remedies and supplements while taking psychotropic medications and of the need to consult prescriber prior to adding any of these to current regimen. Patient should abstain from abuse of alcohol, prescription medications, and illicit drugs. Reviewed when to contact clinic and/or seek emergent care, such as but not limited to, onset/worsening SI/HI, hallucinations, delusions, manic symptoms. Pt verbalized understanding and agreement of these warnings/recommendations and verbally consented to treatment plan.      Follow up in about 3 months (around 5/27/2025) for Medication Management.        Brendan Caes, PMHNP         [1]   Current Outpatient Medications:     ADULT LOW DOSE ASPIRIN 81 mg EC tablet, Take 81 mg by mouth nightly., Disp: , Rfl:     amLODIPine (NORVASC) 10 MG tablet, TAKE 1 TABLET BY MOUTH EVERY DAY, Disp: 90 tablet, Rfl: 0    atorvastatin (LIPITOR) 20 MG tablet, TAKE 1 TABLET BY MOUTH EVERY DAY, Disp: 90 tablet, Rfl: 0    blood sugar diagnostic Strp, To check BG 1 times daily, to use with insurance preferred meter, Disp: 30 each, Rfl: 0    blood sugar diagnostic Strp, To check BG one time daily, to use with insurance preferred meter, Disp: 35 each, Rfl: 11    carvediloL (COREG) 12.5 MG tablet, Take 1 tablet (12.5 mg total) by mouth 2 (two) times daily., Disp:  60 tablet, Rfl: 11    cetirizine (ZYRTEC) 10 MG tablet, Take 1 tablet (10 mg total) by mouth daily as needed for Allergies., Disp: 30 tablet, Rfl: 2    clobetasoL (TEMOVATE) 0.05 % cream, Apply topically 2 (two) times daily., Disp: 60 g, Rfl: 11    cyclobenzaprine (FLEXERIL) 10 MG tablet, Take 1 tablet (10 mg total) by mouth every evening., Disp: 30 tablet, Rfl: 5    fluticasone propionate (FLONASE) 50 mcg/actuation nasal spray, 1 spray (50 mcg total) by Each Nostril route 2 (two) times daily., Disp: 11.1 mL, Rfl: 5    gabapentin (NEURONTIN) 400 MG capsule, TAKE 1 CAPSULE BY MOUTH EVERY MORNING AND 2 CAPSULES AT BEDTIME, Disp: 270 capsule, Rfl: 3    ibuprofen (ADVIL,MOTRIN) 800 MG tablet, Take 1 tablet (800 mg total) by mouth every 6 (six) hours as needed for Pain., Disp: 30 tablet, Rfl: 2    levETIRAcetam (KEPPRA) 500 MG Tab, TAKE 1 TABLET BY MOUTH TWO TIMES A DAY, Disp: 180 tablet, Rfl: 3    metFORMIN (GLUCOPHAGE-XR) 500 MG ER 24hr tablet, TAKE 2 TABLETS BY MOUTH EVERY EVENING, Disp: 60 tablet, Rfl: 2    nitroGLYCERIN (NITROSTAT) 0.4 MG SL tablet, 1 TABLET UNDER TONGUE EVERY  5 MINUTES NO MORE THAN 3 TABS THEN GO TO ER FOR CHEST PAIN, Disp: 25 tablet, Rfl: 2    nystatin (MYCOSTATIN) ointment, APPLY TO AFFECTED AREA TWO TIMES A DAY, Disp: 30 g, Rfl: 2    semaglutide (OZEMPIC) 1 mg/dose (4 mg/3 mL), INJECT 1MG INTO SKIN EVERY 7 DAYS, Disp: 3 mL, Rfl: 0    TRUE METRIX GLUCOSE METER Misc, Inject 1 each into the skin once daily., Disp: , Rfl:     TRUEPLUS LANCETS 33 gauge Misc, Apply 1 lancet  topically once daily., Disp: , Rfl:     ALPRAZolam (XANAX) 0.5 MG tablet, Take 1 tablet (0.5 mg total) by mouth 3 (three) times daily as needed for Anxiety., Disp: 90 tablet, Rfl: 1    DULoxetine (CYMBALTA) 60 MG capsule, Take 1 capsule (60 mg total) by mouth once daily., Disp: 30 capsule, Rfl: 1    zolpidem (AMBIEN) 10 mg Tab, Take 1 tablet (10 mg total) by mouth nightly as needed (insomnia)., Disp: 30 tablet, Rfl: 1

## 2025-02-28 ENCOUNTER — RESULTS FOLLOW-UP (OUTPATIENT)
Dept: FAMILY MEDICINE | Facility: CLINIC | Age: 55
End: 2025-02-28
Payer: MEDICAID

## 2025-02-28 DIAGNOSIS — M25.571 ARTHRALGIA OF BOTH ANKLES: ICD-10-CM

## 2025-02-28 DIAGNOSIS — M25.572 ARTHRALGIA OF BOTH ANKLES: ICD-10-CM

## 2025-02-28 RX ORDER — IBUPROFEN 800 MG/1
800 TABLET ORAL EVERY 6 HOURS PRN
Qty: 30 TABLET | Refills: 2 | Status: SHIPPED | OUTPATIENT
Start: 2025-02-28

## 2025-02-28 NOTE — TELEPHONE ENCOUNTER
----- Message from CELY Aquino sent at 2/28/2025  6:27 AM CST -----  Regarding: FW:  Can you let Jessica know that her x-ray of her knee looked normal. Since she injured it, go ahead and let her know to do anti-inflammatories if she can take them, wrap the knee, elevate, and rest. She also needs to watch her blood pressure and we may need to follow up on that if it remains high.  ----- Message -----  From: Interface, Rad Results In  Sent: 2/27/2025  11:21 AM CST  To: CELY Goode

## 2025-03-06 ENCOUNTER — TELEPHONE (OUTPATIENT)
Dept: FAMILY MEDICINE | Facility: CLINIC | Age: 55
End: 2025-03-06
Payer: MEDICAID

## 2025-03-06 NOTE — TELEPHONE ENCOUNTER
Called patient and explained to her that the blood pressure machines are over the counter items. Patient verbalized understanding.

## 2025-03-17 DIAGNOSIS — I10 HYPERTENSION, UNSPECIFIED TYPE: ICD-10-CM

## 2025-03-17 DIAGNOSIS — E78.5 HYPERLIPIDEMIA, UNSPECIFIED HYPERLIPIDEMIA TYPE: ICD-10-CM

## 2025-03-17 RX ORDER — ATORVASTATIN CALCIUM 20 MG/1
20 TABLET, FILM COATED ORAL
Qty: 90 TABLET | Refills: 0 | Status: SHIPPED | OUTPATIENT
Start: 2025-03-17

## 2025-03-17 RX ORDER — CARVEDILOL 12.5 MG/1
12.5 TABLET ORAL 2 TIMES DAILY
Qty: 60 TABLET | Refills: 0 | Status: SHIPPED | OUTPATIENT
Start: 2025-03-17

## 2025-03-24 DIAGNOSIS — E11.9 TYPE 2 DIABETES MELLITUS WITHOUT COMPLICATION, WITHOUT LONG-TERM CURRENT USE OF INSULIN: ICD-10-CM

## 2025-03-24 DIAGNOSIS — J30.2 SEASONAL ALLERGIES: ICD-10-CM

## 2025-03-24 RX ORDER — SEMAGLUTIDE 1.34 MG/ML
INJECTION, SOLUTION SUBCUTANEOUS
Qty: 3 ML | Refills: 0 | Status: SHIPPED | OUTPATIENT
Start: 2025-03-24

## 2025-03-24 RX ORDER — CETIRIZINE HYDROCHLORIDE 10 MG/1
TABLET ORAL
Qty: 30 TABLET | Refills: 0 | Status: SHIPPED | OUTPATIENT
Start: 2025-03-24

## 2025-03-31 DIAGNOSIS — E11.9 TYPE 2 DIABETES MELLITUS WITHOUT COMPLICATION, WITHOUT LONG-TERM CURRENT USE OF INSULIN: ICD-10-CM

## 2025-03-31 RX ORDER — CALCIUM CITRATE/VITAMIN D3 200MG-6.25
TABLET ORAL
Qty: 50 STRIP | Refills: 11 | Status: SHIPPED | OUTPATIENT
Start: 2025-03-31

## 2025-04-01 DIAGNOSIS — M25.571 ARTHRALGIA OF BOTH ANKLES: ICD-10-CM

## 2025-04-01 DIAGNOSIS — M25.572 ARTHRALGIA OF BOTH ANKLES: ICD-10-CM

## 2025-04-01 RX ORDER — IBUPROFEN 800 MG/1
800 TABLET ORAL NIGHTLY PRN
Qty: 30 TABLET | Refills: 2 | Status: SHIPPED | OUTPATIENT
Start: 2025-04-01

## 2025-04-08 DIAGNOSIS — E11.9 TYPE 2 DIABETES MELLITUS WITHOUT COMPLICATION, WITHOUT LONG-TERM CURRENT USE OF INSULIN: ICD-10-CM

## 2025-04-08 RX ORDER — METFORMIN HYDROCHLORIDE 500 MG/1
1000 TABLET, EXTENDED RELEASE ORAL NIGHTLY
Qty: 60 TABLET | Refills: 0 | Status: SHIPPED | OUTPATIENT
Start: 2025-04-08

## 2025-04-09 PROCEDURE — 80053 COMPREHEN METABOLIC PANEL: CPT | Performed by: NURSE PRACTITIONER

## 2025-04-09 PROCEDURE — 80061 LIPID PANEL: CPT | Performed by: NURSE PRACTITIONER

## 2025-04-09 PROCEDURE — 83036 HEMOGLOBIN GLYCOSYLATED A1C: CPT | Performed by: NURSE PRACTITIONER

## 2025-04-10 ENCOUNTER — RESULTS FOLLOW-UP (OUTPATIENT)
Dept: FAMILY MEDICINE | Facility: CLINIC | Age: 55
End: 2025-04-10

## 2025-04-14 DIAGNOSIS — I10 HYPERTENSION, UNSPECIFIED TYPE: ICD-10-CM

## 2025-04-15 RX ORDER — CARVEDILOL 12.5 MG/1
12.5 TABLET ORAL 2 TIMES DAILY
Qty: 60 TABLET | Refills: 0 | Status: SHIPPED | OUTPATIENT
Start: 2025-04-15

## 2025-04-15 RX ORDER — AMLODIPINE BESYLATE 10 MG/1
10 TABLET ORAL
Qty: 30 TABLET | Refills: 0 | Status: SHIPPED | OUTPATIENT
Start: 2025-04-15

## 2025-04-19 DIAGNOSIS — E11.9 TYPE 2 DIABETES MELLITUS WITHOUT COMPLICATION, WITHOUT LONG-TERM CURRENT USE OF INSULIN: ICD-10-CM

## 2025-04-21 DIAGNOSIS — G47.00 INSOMNIA, UNSPECIFIED TYPE: ICD-10-CM

## 2025-04-21 DIAGNOSIS — J30.2 SEASONAL ALLERGIES: ICD-10-CM

## 2025-04-21 RX ORDER — CETIRIZINE HYDROCHLORIDE 10 MG/1
10 TABLET ORAL DAILY
Qty: 30 TABLET | Refills: 11 | Status: SHIPPED | OUTPATIENT
Start: 2025-04-21

## 2025-04-21 RX ORDER — SEMAGLUTIDE 1.34 MG/ML
INJECTION, SOLUTION SUBCUTANEOUS
Qty: 3 ML | Refills: 0 | Status: SHIPPED | OUTPATIENT
Start: 2025-04-21

## 2025-04-22 RX ORDER — ZOLPIDEM TARTRATE 10 MG/1
10 TABLET ORAL NIGHTLY PRN
Qty: 30 TABLET | Refills: 0 | Status: SHIPPED | OUTPATIENT
Start: 2025-04-22

## 2025-04-28 ENCOUNTER — PATIENT OUTREACH (OUTPATIENT)
Facility: CLINIC | Age: 55
End: 2025-04-28
Payer: MEDICAID

## 2025-04-28 NOTE — PROGRESS NOTES
Health Maintenance Topic(s) Outreach Outcomes & Actions Taken:    Breast Cancer Screening - Outreach Outcomes & Actions Taken  : Mammogram Order Placed and Left message to schedule

## 2025-05-06 ENCOUNTER — OFFICE VISIT (OUTPATIENT)
Dept: BEHAVIORAL HEALTH | Facility: CLINIC | Age: 55
End: 2025-05-06
Payer: MEDICAID

## 2025-05-06 ENCOUNTER — OFFICE VISIT (OUTPATIENT)
Dept: FAMILY MEDICINE | Facility: CLINIC | Age: 55
End: 2025-05-06
Payer: MEDICAID

## 2025-05-06 VITALS
SYSTOLIC BLOOD PRESSURE: 120 MMHG | BODY MASS INDEX: 26.8 KG/M2 | HEIGHT: 62 IN | TEMPERATURE: 99 F | HEART RATE: 81 BPM | WEIGHT: 145.63 LBS | OXYGEN SATURATION: 96 % | DIASTOLIC BLOOD PRESSURE: 82 MMHG

## 2025-05-06 DIAGNOSIS — F41.1 GENERALIZED ANXIETY DISORDER: ICD-10-CM

## 2025-05-06 DIAGNOSIS — E11.9 TYPE 2 DIABETES MELLITUS WITHOUT COMPLICATION, WITHOUT LONG-TERM CURRENT USE OF INSULIN: Primary | ICD-10-CM

## 2025-05-06 DIAGNOSIS — M10.9 ACUTE GOUT OF MULTIPLE SITES, UNSPECIFIED CAUSE: ICD-10-CM

## 2025-05-06 DIAGNOSIS — F33.9 EPISODE OF RECURRENT MAJOR DEPRESSIVE DISORDER, UNSPECIFIED DEPRESSION EPISODE SEVERITY: Primary | ICD-10-CM

## 2025-05-06 DIAGNOSIS — G47.00 INSOMNIA, UNSPECIFIED TYPE: ICD-10-CM

## 2025-05-06 DIAGNOSIS — E66.3 OVERWEIGHT WITH BODY MASS INDEX (BMI) OF 26 TO 26.9 IN ADULT: ICD-10-CM

## 2025-05-06 PROCEDURE — 3044F HG A1C LEVEL LT 7.0%: CPT | Mod: CPTII,,, | Performed by: NURSE PRACTITIONER

## 2025-05-06 PROCEDURE — 99214 OFFICE O/P EST MOD 30 MIN: CPT | Mod: ,,, | Performed by: NURSE PRACTITIONER

## 2025-05-06 PROCEDURE — 3079F DIAST BP 80-89 MM HG: CPT | Mod: CPTII,,, | Performed by: NURSE PRACTITIONER

## 2025-05-06 PROCEDURE — 1159F MED LIST DOCD IN RCRD: CPT | Mod: CPTII,,, | Performed by: NURSE PRACTITIONER

## 2025-05-06 PROCEDURE — 1160F RVW MEDS BY RX/DR IN RCRD: CPT | Mod: CPTII,,, | Performed by: NURSE PRACTITIONER

## 2025-05-06 PROCEDURE — 3074F SYST BP LT 130 MM HG: CPT | Mod: CPTII,,, | Performed by: NURSE PRACTITIONER

## 2025-05-06 PROCEDURE — 3008F BODY MASS INDEX DOCD: CPT | Mod: CPTII,,, | Performed by: NURSE PRACTITIONER

## 2025-05-06 RX ORDER — INDOMETHACIN 50 MG/1
50 CAPSULE ORAL 3 TIMES DAILY PRN
Qty: 30 CAPSULE | Refills: 2 | Status: SHIPPED | OUTPATIENT
Start: 2025-05-06

## 2025-05-06 RX ORDER — ZOLPIDEM TARTRATE 10 MG/1
10 TABLET ORAL NIGHTLY PRN
Qty: 30 TABLET | Refills: 1 | Status: SHIPPED | OUTPATIENT
Start: 2025-05-06

## 2025-05-06 RX ORDER — COLCHICINE 0.6 MG/1
CAPSULE ORAL
Qty: 3 CAPSULE | Refills: 2 | Status: SHIPPED | OUTPATIENT
Start: 2025-05-06

## 2025-05-06 RX ORDER — SEMAGLUTIDE 2.68 MG/ML
2 INJECTION, SOLUTION SUBCUTANEOUS
Qty: 3 ML | Refills: 11 | Status: SHIPPED | OUTPATIENT
Start: 2025-05-06 | End: 2026-05-06

## 2025-05-06 RX ORDER — ALPRAZOLAM 0.5 MG/1
0.5 TABLET ORAL 3 TIMES DAILY PRN
Qty: 90 TABLET | Refills: 1 | Status: SHIPPED | OUTPATIENT
Start: 2025-05-06

## 2025-05-06 RX ORDER — DULOXETIN HYDROCHLORIDE 60 MG/1
60 CAPSULE, DELAYED RELEASE ORAL DAILY
Qty: 30 CAPSULE | Refills: 1 | Status: SHIPPED | OUTPATIENT
Start: 2025-05-06

## 2025-05-06 NOTE — ASSESSMENT & PLAN NOTE
Diabetes labs:   Lab Results   Component Value Date    HGBA1C 5.5 04/09/2025      STOP Metformin and INCREASE Ozempic to 2 mg weekly.   Take all medications as directed; compliance is critical for managing your diabetes.   Follow American Diabetic Association (ADA) dietary guidelines for eating and implement exercise into your daily regimen.   Check your glucose levels each morning and record for review at follow up.

## 2025-05-06 NOTE — PROGRESS NOTES
Patient ID: Dasha Curiel  : 1970    Chief Complaint: Diabetes (F/u)    Allergies: Patient is allergic to iodine, meperidine, and promethazine.     Subjective :  The patient is a 55 y.o. Black or  female who presents to clinic for follow up on Diabetes (F/u)   History of Present Illness    HPI:  Patient reports a recent fall while walking home after being dropped off by her daughter. Her ankle gave way, causing her to fall and tear her pants. Following this incident, she noticed swelling in her right great toe while cooking. She describes the toe as feeling tight and painful, and characterizes the ankle pain as throbbing.    Patient identifies the toe pain as likely being a gout flare, noting its typical location. She states the toe is tender and painful, though less tender than usual during a gout flare. Her last gout flare occurred possibly 10 years ago, during which her entire foot was swollen and she was unable to walk.    Regarding medication, she has run out of Indomethacin, which she had been using for pain management.    Patient denies any other injuries or trauma to the toe besides the gout flare.    MEDICATIONS:  Patient is on Ozempic 1 mg weekly injection for diabetes management and weight loss, which has been effective. She is also on Metformin for diabetes management, Diclofenac for pain management, and Indomethacin for gout management. Jardiance was discontinued due to urinary symptoms.    MEDICAL HISTORY:  Patient has a history of gout and diabetes.    TEST RESULTS:  Patient's recent A1C level was 5.5%. Her recent cholesterol levels were described as optimal. Both her kidney and liver function tests were recent and described as good.      ROS:  ROS as indicated in HPI.           Social History:  reports that she has never smoked. She has never been exposed to tobacco smoke. She has never used smokeless tobacco. She reports that she does not currently use alcohol. She  "reports that she does not currently use drugs after having used the following drugs: "Crack" cocaine.    Past Medical History:  has a past medical history of Anxiety, CHF (congestive heart failure), CKD (chronic kidney disease), Depression, Diabetes mellitus, type 2, DM (diabetes mellitus), Elevated antinuclear antibody (LLOYD) level, Gout, unspecified, Hypertension, Insomnia, Lumbar radiculopathy, right, and Neuropathy.    Current Medications:  Current Outpatient Medications   Medication Instructions    ADULT LOW DOSE ASPIRIN 81 mg, Nightly    ALPRAZolam (XANAX) 0.5 mg, Oral, 3 times daily PRN    amLODIPine (NORVASC) 10 mg, Oral    atorvastatin (LIPITOR) 20 mg, Oral    blood sugar diagnostic Strp To check BG 1 times daily, to use with insurance preferred meter    carvediloL (COREG) 12.5 mg, Oral, 2 times daily    cetirizine (ZYRTEC) 10 mg, Oral, Daily    clobetasoL (TEMOVATE) 0.05 % cream Topical (Top), 2 times daily    colchicine (MITIGARE) 0.6 mg Cap Take 2 caps now, then take 1 cap 1 hour later    cyclobenzaprine (FLEXERIL) 10 mg, Oral, Nightly    DULoxetine (CYMBALTA) 60 mg, Oral, Daily    fluticasone propionate (FLONASE) 50 mcg, Each Nostril, 2 times daily    gabapentin (NEURONTIN) 400 MG capsule TAKE 1 CAPSULE BY MOUTH EVERY MORNING AND 2 CAPSULES AT BEDTIME    ibuprofen (ADVIL,MOTRIN) 800 mg, Oral, Nightly PRN, pain    indomethacin (INDOCIN) 50 mg, Oral, 3 times daily PRN    levETIRAcetam (KEPPRA) 500 mg, Oral, 2 times daily    nitroGLYCERIN (NITROSTAT) 0.4 MG SL tablet 1 TABLET UNDER TONGUE EVERY  5 MINUTES NO MORE THAN 3 TABS THEN GO TO ER FOR CHEST PAIN    nystatin (MYCOSTATIN) ointment APPLY TO AFFECTED AREA TWO TIMES A DAY    OZEMPIC 2 mg, Subcutaneous, Every 7 days    TRUE METRIX GLUCOSE METER Misc 1 each, Daily    TRUE METRIX GLUCOSE TEST STRIP Strp USE TO CHECK BG ONE TIME DAILY    TRUEPLUS LANCETS 33 gauge Misc 1 lancet , Daily    zolpidem (AMBIEN) 10 mg, Oral, Nightly PRN         Visit Vitals  BP " "120/82 (BP Location: Left arm, Patient Position: Sitting)   Pulse 81   Temp 98.6 °F (37 °C)   Ht 5' 2.01" (1.575 m)   Wt 66 kg (145 lb 9.6 oz)   SpO2 96%   BMI 26.62 kg/m²       Physical Exam  Vitals reviewed.   Constitutional:       Appearance: Normal appearance.   Cardiovascular:      Heart sounds: Normal heart sounds.   Pulmonary:      Breath sounds: Normal breath sounds.   Musculoskeletal:      Comments: Right great toe erythema and tenderness at MTP joint   Skin:     General: Skin is warm and dry.          Labs Reviewed:  Chemistry:  Lab Results   Component Value Date     04/09/2025    K 3.8 04/09/2025    BUN 17 04/09/2025    CREATININE 0.81 04/09/2025    EGFRNORACEVR 86 04/09/2025    CALCIUM 10.0 04/09/2025    ALKPHOS 90 04/09/2025    ALBUMIN 4.5 04/09/2025    BILIDIR 0.10 05/24/2019    IBILI 0.30 05/24/2019    AST 29 04/09/2025    ALT 19 04/09/2025    MG 2.5 (H) 05/23/2019    TSH 1.120 04/03/2024        Lab Results   Component Value Date    HGBA1C 5.5 04/09/2025       Lipid Panel:  Lab Results   Component Value Date    CHOL 142 04/09/2025    HDL 93 (H) 04/09/2025    LDLDIRECT <30.0 (L) 04/09/2025    TRIG 96 04/09/2025    TOTALCHOLEST 3.3 05/24/2019        Assessment & Plan:  1. Type 2 diabetes mellitus without complication, without long-term current use of insulin  Overview:  A1c 6.8% (08/2022)  Remains on Trulicity 0.75 mg weekly, Metformin 1000 mg, and Jardiance 25 mg daily.  Discontinued Jardiance due to SE. Stop Trulicity and Start Ozempic (11/21/23)      Assessment & Plan:  Diabetes labs:   Lab Results   Component Value Date    HGBA1C 5.5 04/09/2025      STOP Metformin and INCREASE Ozempic to 2 mg weekly.   Take all medications as directed; compliance is critical for managing your diabetes.   Follow American Diabetic Association (ADA) dietary guidelines for eating and implement exercise into your daily regimen.   Check your glucose levels each morning and record for review at follow " up.      Orders:  -     semaglutide (OZEMPIC) 2 mg/dose (8 mg/3 mL) PnIj; Inject 2 mg into the skin every 7 days.  Dispense: 3 mL; Refill: 11    2. Acute gout of multiple sites, unspecified cause  Comments:  Colchicine 1.2 mg now and then 0.6 mg in 1 hour.   Indomethacin as needed for Gout flare. DO NOT take Ibuprofen while taking Indomethacin.  Orders:  -     colchicine (MITIGARE) 0.6 mg Cap; Take 2 caps now, then take 1 cap 1 hour later  Dispense: 3 capsule; Refill: 2  -     indomethacin (INDOCIN) 50 MG capsule; Take 1 capsule (50 mg total) by mouth 3 (three) times daily as needed (GOUT PAIN).  Dispense: 30 capsule; Refill: 2    3. Overweight with body mass index (BMI) of 26 to 26.9 in adult  Assessment & Plan:  Body mass index is 26.62 kg/m².  Goal BMI <30.  Exercise 5 times a week for 30 minutes per day.  Avoid soda, simple sugars, excessive rice, potatoes or bread. Limit fast foods and fried foods.  Choose complex carbs in moderation (example: green vegetables, beans, oatmeal). Eat plenty of fresh fruits and vegetables with lean meats daily.  Do not skip meals. Eat a balanced portion size.  Avoid fad diets. Consider permanent healthy life style changes.              Assessment & Plan    E11.9 Type 2 diabetes mellitus without complication, without long-term current use of insulin  M10.9 Acute gout of multiple sites, unspecified cause  E66.3, Z68.26 Overweight with body mass index (BMI) of 26 to 26.9 in adult    IMPRESSION:   Assessed recent fall and ankle pain.   Evaluated right great toe swelling and tenderness, diagnosed as gout flare.   Reviewed lab results: good kidney and liver function, excellent cholesterol levels, well-controlled A1C at 5.5.   Discontinued Metformin due to excellent A1C control.   Maximized Ozempic dosage from 1 mg to 2 mg weekly for continued diabetes management and weight loss.    E11.9 TYPE 2 DIABETES MELLITUS WITHOUT COMPLICATION, WITHOUT LONG-TERM CURRENT USE OF INSULIN:   Maximized  Ozempic dosage from 1 mg to 2 mg weekly for diabetes management.   Informed patient that Ozempic is unlikely to cause low blood sugar, so checking glucose levels before injection is unnecessary.    M10.9 ACUTE GOUT OF MULTIPLE SITES, UNSPECIFIED CAUSE:   Explained that gout typically affects the joint of the great toe.   Started colchicine 0.6 mg: Take 2 capsules immediately, then 1 capsule 1 hour later for acute gout flare.   Refilled indomethacin for gout management.   Follow up in 1 week to obtain a refill of colchicine to keep as an emergency supply at home for future gout flares.    E66.3, Z68.26 OVERWEIGHT WITH BODY MASS INDEX (BMI) OF 26 TO 26.9 IN ADULT:   Increased Ozempic dosage from 1 mg to 2 mg weekly to support weight loss goals.         Future Appointments   Date Time Provider Department Center   5/6/2025  9:40 AM Brendan Case, PMHNP [facility]   12/11/2025  8:00 AM LAB, [facility] LABORATORY DRAW STATION [facility] JANE Devaughn [facility]   12/18/2025  1:30 PM Zulma Zhang FNP-C [facility]       Follow up if symptoms worsen or fail to improve, for Keep appointment as scheduled. Call sooner if needed.    CELY Kramer    Lab Frequency Next Occurrence   Ambulatory referral/consult to Ophthalmology Once 05/02/2024   Ambulatory referral/consult to [facility] COLONOSCOPY Once 05/02/2024   CBC Auto Differential Once 12/12/2025   Comprehensive Metabolic Panel Once 12/12/2025   Lipid Panel Once 12/12/2025   TSH Once 12/12/2025   Hemoglobin A1C Once 12/12/2025   Microalbumin/Creatinine Ratio, Urine Once 12/12/2025   Mammo Digital Screening Bilat w/ Jayesh (XPD) Once 04/24/2026            This note was generated with the assistance of ambient listening technology. Verbal consent was obtained by the patient and accompanying visitor(s) for the recording of patient appointment to facilitate this note. I attest to having reviewed and edited the generated note for accuracy, though  some syntax or spelling errors may persist. Please contact the author of this note for any clarification.

## 2025-05-06 NOTE — PROGRESS NOTES
PSYCHIATRIC FOLLOW-UP VISIT NOTE    Chief Complaint   Patient presents with    Medication Management     Medication management         History of Present Illness  55 y.o. year old Black or  female with hx of MDD, scot, and insomnia seen today for follow-up appointment and medication management.  Patient reports that she has been doing very well since our last visit.  Denies any recent depression or anxiety symptoms.  Feels they are well-controlled with current medication regimen.  She has also been sleeping well for the most part.  For the past week or 2 she has been waking up at approximately 4:00 a.m..  States she falls asleep around 9:00 a.m., for a total of 7 hours of sleep per night.  She would prefer to sleep till 5 or 6, and we discussed moving her Ambien administration time to 10:00 p.m. rather than 9:00 p.m..  Hopefully this will allow her to rest until later in the morning.  We will discuss efficacy at next visit.  She will contact the clinic prior to that she if not see any improvement. Patient denies SI/HI. Denies hallucinations and does not appear to be responding to internal stimuli or be internally preoccupied. No manic symptoms noted.         Objective:     Vitals:  There were no vitals filed for this visit.    Wt Readings from Last 3 Encounters:   05/06/25 0823 66 kg (145 lb 9.6 oz)   02/27/25 1043 67.9 kg (149 lb 11.1 oz)   12/27/24 1042 70.4 kg (155 lb 3.3 oz)         Medication:  Current Medications[1]       Significant Labs: - none at this time    Significant Imaging: - none at this time    Physical Exam  Vitals and nursing note reviewed.   Constitutional:       General: She is awake.      Appearance: Normal appearance.   Musculoskeletal:      Comments: Full ROM   Neurological:      Mental Status: She is alert.   Psychiatric:         Attention and Perception: Attention and perception normal. She does not perceive auditory or visual hallucinations.         Mood and Affect: Affect  "normal.         Speech: Speech normal.         Behavior: Behavior is cooperative.         Thought Content: Thought content does not include homicidal or suicidal ideation.         Cognition and Memory: Cognition and memory normal.          Review of Systems     Mental Status Exam:  Presentation:  - Appearance: 55 y.o. year old Black or  female, appears stated age, appears Casually dressed and Well groomed  - Motility: Erect when standing, Steady gait, No EPS or Tremors, No psychomotor agitation or retardation appreciated  - Behavior: calm, cooperative, good eye contact  Speech:  - Character/Organization: spontaneous, fluent, normal volume, normal rate, normal rhythm  Emotional State:  - Mood: "happy"   - Affect: congruent and appropriate  Thought:  - Process: logical, linear, organized , goal-directed  - Preoccupations: no ruminations, rituals, or phobias appreciated  - Delusions: no persecutory, paranoid, or grandiose delusions appreciated  - Perception: denies AVH, not actively responding to internal stimuli  - SI/HI: denies/denies  Sensorium & Intellect:  - Sensorium: AAOx4  - Memory: intact to recent and remote events  - Attention/Concentration: good/good  - Insight/Judgement: good/good    Gait: normal swing and stance  MSK:no rigidity appreciated    All other systems without acute issues unless noted in HPI      Assessment/Plan      ICD-10-CM ICD-9-CM    1. Episode of recurrent major depressive disorder, unspecified depression episode severity  F33.9 296.30 DULoxetine (CYMBALTA) 60 MG capsule      2. Generalized anxiety disorder  F41.1 300.02 ALPRAZolam (XANAX) 0.5 MG tablet      DULoxetine (CYMBALTA) 60 MG capsule      3. Insomnia, unspecified type  G47.00 780.52 zolpidem (AMBIEN) 10 mg Tab         Continue current medications without change     checked. Results as expected. No suspected diversion or abuse of controlled substances.    Potential side effects and risks vs benefits of current " treatment plan reviewed with patient. Applicable black box warnings reviewed. Encouraged patient not to alter dosages or abruptly discontinue medications without contacting prescriber first, due to risk of worsening symptoms and decompensation of mental status. Warned of risks associated with herbal remedies and supplements while taking psychotropic medications and of the need to consult prescriber prior to adding any of these to current regimen. Patient should abstain from abuse of alcohol, prescription medications, and illicit drugs. Reviewed when to contact clinic and/or seek emergent care, such as but not limited to, onset/worsening SI/HI, hallucinations, delusions, manic symptoms. Pt verbalized understanding and agreement of these warnings/recommendations and verbally consented to treatment plan.      Follow up in about 2 months (around 7/6/2025) for Medication Management.        Brendan Case, PMP         [1]   Current Outpatient Medications:     ADULT LOW DOSE ASPIRIN 81 mg EC tablet, Take 81 mg by mouth nightly., Disp: , Rfl:     amLODIPine (NORVASC) 10 MG tablet, TAKE 1 TABLET BY MOUTH EVERY DAY, Disp: 30 tablet, Rfl: 0    atorvastatin (LIPITOR) 20 MG tablet, TAKE 1 TABLET BY MOUTH EVERY DAY, Disp: 90 tablet, Rfl: 0    blood sugar diagnostic Strp, To check BG 1 times daily, to use with insurance preferred meter, Disp: 30 each, Rfl: 0    carvediloL (COREG) 12.5 MG tablet, TAKE 1 TABLET BY MOUTH TWO TIMES A DAY, Disp: 60 tablet, Rfl: 0    cetirizine (ZYRTEC) 10 MG tablet, Take 1 tablet (10 mg total) by mouth once daily., Disp: 30 tablet, Rfl: 11    clobetasoL (TEMOVATE) 0.05 % cream, Apply topically 2 (two) times daily., Disp: 60 g, Rfl: 11    colchicine (MITIGARE) 0.6 mg Cap, Take 2 caps now, then take 1 cap 1 hour later, Disp: 3 capsule, Rfl: 2    cyclobenzaprine (FLEXERIL) 10 MG tablet, Take 1 tablet (10 mg total) by mouth every evening., Disp: 30 tablet, Rfl: 5    fluticasone propionate  (FLONASE) 50 mcg/actuation nasal spray, 1 spray (50 mcg total) by Each Nostril route 2 (two) times daily., Disp: 11.1 mL, Rfl: 5    gabapentin (NEURONTIN) 400 MG capsule, TAKE 1 CAPSULE BY MOUTH EVERY MORNING AND 2 CAPSULES AT BEDTIME, Disp: 270 capsule, Rfl: 3    ibuprofen (ADVIL,MOTRIN) 800 MG tablet, TAKE 1 TABLET BY MOUTH EVERY SIX HOURS AS NEEDED FOR PAIN, Disp: 30 tablet, Rfl: 2    indomethacin (INDOCIN) 50 MG capsule, Take 1 capsule (50 mg total) by mouth 3 (three) times daily as needed (GOUT PAIN)., Disp: 30 capsule, Rfl: 2    levETIRAcetam (KEPPRA) 500 MG Tab, TAKE 1 TABLET BY MOUTH TWO TIMES A DAY, Disp: 180 tablet, Rfl: 3    nitroGLYCERIN (NITROSTAT) 0.4 MG SL tablet, 1 TABLET UNDER TONGUE EVERY  5 MINUTES NO MORE THAN 3 TABS THEN GO TO ER FOR CHEST PAIN, Disp: 25 tablet, Rfl: 2    nystatin (MYCOSTATIN) ointment, APPLY TO AFFECTED AREA TWO TIMES A DAY, Disp: 30 g, Rfl: 2    semaglutide (OZEMPIC) 2 mg/dose (8 mg/3 mL) PnIj, Inject 2 mg into the skin every 7 days., Disp: 3 mL, Rfl: 11    TRUE METRIX GLUCOSE METER Misc, Inject 1 each into the skin once daily., Disp: , Rfl:     TRUE METRIX GLUCOSE TEST STRIP Strp, USE TO CHECK BG ONE TIME DAILY, Disp: 50 strip, Rfl: 11    TRUEPLUS LANCETS 33 gauge Misc, Apply 1 lancet  topically once daily., Disp: , Rfl:     ALPRAZolam (XANAX) 0.5 MG tablet, Take 1 tablet (0.5 mg total) by mouth 3 (three) times daily as needed for Anxiety., Disp: 90 tablet, Rfl: 1    DULoxetine (CYMBALTA) 60 MG capsule, Take 1 capsule (60 mg total) by mouth once daily., Disp: 30 capsule, Rfl: 1    zolpidem (AMBIEN) 10 mg Tab, Take 1 tablet (10 mg total) by mouth nightly as needed (insomnia)., Disp: 30 tablet, Rfl: 1

## 2025-05-06 NOTE — ASSESSMENT & PLAN NOTE

## 2025-05-13 DIAGNOSIS — E11.9 TYPE 2 DIABETES MELLITUS WITHOUT COMPLICATION, WITHOUT LONG-TERM CURRENT USE OF INSULIN: ICD-10-CM

## 2025-05-13 DIAGNOSIS — Z79.899 MEDICATION MANAGEMENT: ICD-10-CM

## 2025-05-13 DIAGNOSIS — D86.3 CUTANEOUS SARCOIDOSIS: ICD-10-CM

## 2025-05-13 DIAGNOSIS — I10 HYPERTENSION, UNSPECIFIED TYPE: ICD-10-CM

## 2025-05-13 RX ORDER — METFORMIN HYDROCHLORIDE 500 MG/1
1000 TABLET, EXTENDED RELEASE ORAL NIGHTLY
Qty: 60 TABLET | Refills: 5 | OUTPATIENT
Start: 2025-05-13

## 2025-05-13 RX ORDER — FLUTICASONE PROPIONATE 50 MCG
SPRAY, SUSPENSION (ML) NASAL
Qty: 16 G | Refills: 5 | Status: SHIPPED | OUTPATIENT
Start: 2025-05-13

## 2025-05-13 RX ORDER — CLOBETASOL PROPIONATE 0.5 MG/G
CREAM TOPICAL
Qty: 60 G | Refills: 5 | Status: SHIPPED | OUTPATIENT
Start: 2025-05-13

## 2025-05-13 RX ORDER — CARVEDILOL 12.5 MG/1
12.5 TABLET ORAL 2 TIMES DAILY
Qty: 60 TABLET | Refills: 5 | Status: SHIPPED | OUTPATIENT
Start: 2025-05-13

## 2025-05-23 ENCOUNTER — TELEPHONE (OUTPATIENT)
Dept: FAMILY MEDICINE | Facility: CLINIC | Age: 55
End: 2025-05-23
Payer: MEDICAID

## 2025-05-23 DIAGNOSIS — M54.16 LUMBAR RADICULOPATHY: ICD-10-CM

## 2025-05-23 RX ORDER — NITROGLYCERIN 0.4 MG/1
TABLET SUBLINGUAL
Qty: 25 TABLET | Refills: 2 | OUTPATIENT
Start: 2025-05-23

## 2025-05-23 RX ORDER — NITROGLYCERIN 0.4 MG/1
0.4 TABLET SUBLINGUAL EVERY 5 MIN PRN
Qty: 25 TABLET | Refills: 2 | Status: SHIPPED | OUTPATIENT
Start: 2025-05-23

## 2025-06-02 DIAGNOSIS — M54.50 LUMBAR BACK PAIN: ICD-10-CM

## 2025-06-02 RX ORDER — CYCLOBENZAPRINE HCL 10 MG
10 TABLET ORAL NIGHTLY
Qty: 30 TABLET | Refills: 5 | Status: SHIPPED | OUTPATIENT
Start: 2025-06-02

## 2025-06-04 DIAGNOSIS — I10 HYPERTENSION, UNSPECIFIED TYPE: ICD-10-CM

## 2025-06-04 RX ORDER — AMLODIPINE BESYLATE 10 MG/1
10 TABLET ORAL
Qty: 90 TABLET | Refills: 2 | Status: SHIPPED | OUTPATIENT
Start: 2025-06-04

## 2025-07-02 ENCOUNTER — OFFICE VISIT (OUTPATIENT)
Dept: BEHAVIORAL HEALTH | Facility: CLINIC | Age: 55
End: 2025-07-02
Payer: MEDICAID

## 2025-07-02 VITALS
BODY MASS INDEX: 26.5 KG/M2 | WEIGHT: 144 LBS | HEIGHT: 62 IN | OXYGEN SATURATION: 98 % | DIASTOLIC BLOOD PRESSURE: 80 MMHG | SYSTOLIC BLOOD PRESSURE: 118 MMHG | HEART RATE: 72 BPM | TEMPERATURE: 97 F

## 2025-07-02 DIAGNOSIS — F41.1 GENERALIZED ANXIETY DISORDER: ICD-10-CM

## 2025-07-02 DIAGNOSIS — F33.9 EPISODE OF RECURRENT MAJOR DEPRESSIVE DISORDER, UNSPECIFIED DEPRESSION EPISODE SEVERITY: Primary | ICD-10-CM

## 2025-07-02 DIAGNOSIS — G47.00 INSOMNIA, UNSPECIFIED TYPE: ICD-10-CM

## 2025-07-02 PROCEDURE — 3079F DIAST BP 80-89 MM HG: CPT | Mod: CPTII,,, | Performed by: NURSE PRACTITIONER

## 2025-07-02 PROCEDURE — 3008F BODY MASS INDEX DOCD: CPT | Mod: CPTII,,, | Performed by: NURSE PRACTITIONER

## 2025-07-02 PROCEDURE — 3044F HG A1C LEVEL LT 7.0%: CPT | Mod: CPTII,,, | Performed by: NURSE PRACTITIONER

## 2025-07-02 PROCEDURE — 1160F RVW MEDS BY RX/DR IN RCRD: CPT | Mod: CPTII,,, | Performed by: NURSE PRACTITIONER

## 2025-07-02 PROCEDURE — 3074F SYST BP LT 130 MM HG: CPT | Mod: CPTII,,, | Performed by: NURSE PRACTITIONER

## 2025-07-02 PROCEDURE — 1159F MED LIST DOCD IN RCRD: CPT | Mod: CPTII,,, | Performed by: NURSE PRACTITIONER

## 2025-07-02 PROCEDURE — 99214 OFFICE O/P EST MOD 30 MIN: CPT | Mod: ,,, | Performed by: NURSE PRACTITIONER

## 2025-07-02 RX ORDER — COLCHICINE 0.6 MG/1
0.6 TABLET ORAL DAILY
COMMUNITY
Start: 2025-05-13

## 2025-07-02 RX ORDER — ALPRAZOLAM 0.5 MG/1
0.5 TABLET ORAL 3 TIMES DAILY PRN
Qty: 90 TABLET | Refills: 1 | Status: SHIPPED | OUTPATIENT
Start: 2025-07-02

## 2025-07-02 RX ORDER — DULOXETIN HYDROCHLORIDE 60 MG/1
60 CAPSULE, DELAYED RELEASE ORAL DAILY
Qty: 30 CAPSULE | Refills: 1 | Status: SHIPPED | OUTPATIENT
Start: 2025-07-02

## 2025-07-02 RX ORDER — ZOLPIDEM TARTRATE 10 MG/1
10 TABLET ORAL NIGHTLY PRN
Qty: 30 TABLET | Refills: 1 | Status: SHIPPED | OUTPATIENT
Start: 2025-07-02

## 2025-07-02 NOTE — PROGRESS NOTES
"PSYCHIATRIC FOLLOW-UP VISIT NOTE    Chief Complaint   Patient presents with    Medication Management         History of Present Illness  55 y.o. year old Black or  female with hx of MDD, scot, and insomnia seen today for follow-up appointment and medication management.  Patient reports that she and her daughter have had some increased discord recently.  States her daughter is considering moving out, inpatient thinks this will be very beneficial for both of them.  She has been more sad and isolative recently, prefers to avoid her daughter rather than have further discord and arguments.  Adamantly denies any thoughts of harming self or others.  Feels safe at home.  She is sleeping well at night and feels rested in the morning.  She is not desire any medication changes.  Feels that this is situational and will improve once her daughter finds another place to live. Patient denies SI/HI. Denies hallucinations and does not appear to be responding to internal stimuli or be internally preoccupied. No manic symptoms noted.       Objective:     Vitals:  Vitals:    07/02/25 1022   BP: 118/80   BP Location: Right arm   Pulse: 72   Temp: 97.2 °F (36.2 °C)   TempSrc: Temporal   SpO2: 98%   Weight: 65.3 kg (144 lb)   Height: 5' 2.01" (1.575 m)       Wt Readings from Last 3 Encounters:   07/02/25 1022 65.3 kg (144 lb)   05/06/25 0823 66 kg (145 lb 9.6 oz)   02/27/25 1043 67.9 kg (149 lb 11.1 oz)         Medication:  Current Medications[1]       Significant Labs: - none at this time    Significant Imaging: - none at this time    Physical Exam     See HPI    Review of Systems     Mental Status Exam:  Presentation:  - Appearance: 55 y.o. year old Black or  female, appears stated age, appears Casually dressed and Well groomed  - Motility: Erect when standing, Steady gait, No EPS or Tremors, No psychomotor agitation or retardation appreciated  - Behavior: calm, cooperative, good eye contact  Speech:  - " "Character/Organization: spontaneous, fluent, normal volume, normal rate, normal rhythm  Emotional State:  - Mood: "sad"   - Affect: congruent, sad, and tearful  Thought:  - Process: logical, linear, organized , goal-directed  - Preoccupations: guilt, family  - Delusions: no persecutory, paranoid, or grandiose delusions appreciated  - Perception: denies AVH, not actively responding to internal stimuli  - SI/HI: denies/denies  Sensorium & Intellect:  - Sensorium: AAOx4  - Memory: intact to recent and remote events  - Attention/Concentration: good/good  - Insight/Judgement: good/good    Gait: normal swing and stance  MSK:no rigidity appreciated    All other systems without acute issues unless noted in HPI      Assessment/Plan      ICD-10-CM ICD-9-CM    1. Episode of recurrent major depressive disorder, unspecified depression episode severity  F33.9 296.30 DULoxetine (CYMBALTA) 60 MG capsule      2. Generalized anxiety disorder  F41.1 300.02 DULoxetine (CYMBALTA) 60 MG capsule      ALPRAZolam (XANAX) 0.5 MG tablet      3. Insomnia, unspecified type  G47.00 780.52 zolpidem (AMBIEN) 10 mg Tab         Continue current medications without change     checked. Results as expected. No suspected diversion or abuse of controlled substances.    Potential side effects and risks vs benefits of current treatment plan reviewed with patient. Applicable black box warnings reviewed. Encouraged patient not to alter dosages or abruptly discontinue medications without contacting prescriber first, due to risk of worsening symptoms and decompensation of mental status. Warned of risks associated with herbal remedies and supplements while taking psychotropic medications and of the need to consult prescriber prior to adding any of these to current regimen. Patient should abstain from abuse of alcohol, prescription medications, and illicit drugs. Reviewed when to contact clinic and/or seek emergent care, such as but not limited to, " "onset/worsening SI/HI, hallucinations, delusions, manic symptoms. Pt verbalized understanding and agreement of these warnings/recommendations and verbally consented to treatment plan.      Portions of this note may have been created with voice recognition software. Occasional "wrong-word" or "sound-a-like" substitutions may have occurred due to the inherent limitations of voice recognition software. Please, read the note carefully and recognize, using context, where substitutions have occurred.      Follow up in about 2 months (around 9/2/2025) for Medication Management.        Brendan Case, PMHNP         [1]   Current Outpatient Medications:     ADULT LOW DOSE ASPIRIN 81 mg EC tablet, Take 81 mg by mouth nightly., Disp: , Rfl:     amLODIPine (NORVASC) 10 MG tablet, TAKE 1 TABLET BY MOUTH EVERY DAY, Disp: 90 tablet, Rfl: 2    atorvastatin (LIPITOR) 20 MG tablet, TAKE 1 TABLET BY MOUTH EVERY DAY, Disp: 90 tablet, Rfl: 0    blood sugar diagnostic Strp, To check BG 1 times daily, to use with insurance preferred meter, Disp: 30 each, Rfl: 0    carvediloL (COREG) 12.5 MG tablet, TAKE 1 TABLET BY MOUTH TWO TIMES A DAY, Disp: 60 tablet, Rfl: 5    cetirizine (ZYRTEC) 10 MG tablet, Take 1 tablet (10 mg total) by mouth once daily., Disp: 30 tablet, Rfl: 11    clobetasoL (TEMOVATE) 0.05 % cream, APPLY TO AFFECTED AREA TWO TIMES A DAY, Disp: 60 g, Rfl: 5    colchicine (COLCRYS) 0.6 mg tablet, Take 0.6 mg by mouth once daily., Disp: , Rfl:     cyclobenzaprine (FLEXERIL) 10 MG tablet, TAKE 1 TABLET BY MOUTH EVERY EVENING, Disp: 30 tablet, Rfl: 5    fluticasone propionate (FLONASE) 50 mcg/actuation nasal spray, USE 1 SPRAY IN EACH NOSTRIL TWO TIMES A DAY, Disp: 16 g, Rfl: 5    gabapentin (NEURONTIN) 400 MG capsule, TAKE 1 CAPSULE BY MOUTH EVERY MORNING AND 2 CAPSULES AT BEDTIME, Disp: 270 capsule, Rfl: 3    ibuprofen (ADVIL,MOTRIN) 800 MG tablet, TAKE 1 TABLET BY MOUTH EVERY SIX HOURS AS NEEDED FOR PAIN, Disp: 30 tablet, " Rfl: 2    indomethacin (INDOCIN) 50 MG capsule, Take 1 capsule (50 mg total) by mouth 3 (three) times daily as needed (GOUT PAIN)., Disp: 30 capsule, Rfl: 2    levETIRAcetam (KEPPRA) 500 MG Tab, TAKE 1 TABLET BY MOUTH TWO TIMES A DAY, Disp: 180 tablet, Rfl: 3    nitroGLYCERIN (NITROSTAT) 0.4 MG SL tablet, Take 1 tablet (0.4 mg total) by mouth every 5 (five) minutes as needed for Chest pain. No more than 3 tabs then go to ER for chest pain, Disp: 25 tablet, Rfl: 2    nystatin (MYCOSTATIN) ointment, APPLY TO AFFECTED AREA TWO TIMES A DAY, Disp: 30 g, Rfl: 2    semaglutide (OZEMPIC) 2 mg/dose (8 mg/3 mL) PnIj, Inject 2 mg into the skin every 7 days., Disp: 3 mL, Rfl: 11    TRUE METRIX GLUCOSE METER Misc, Inject 1 each into the skin once daily., Disp: , Rfl:     TRUE METRIX GLUCOSE TEST STRIP Strp, USE TO CHECK BG ONE TIME DAILY, Disp: 50 strip, Rfl: 11    TRUEPLUS LANCETS 33 gauge Misc, Apply 1 lancet  topically once daily., Disp: , Rfl:     ALPRAZolam (XANAX) 0.5 MG tablet, Take 1 tablet (0.5 mg total) by mouth 3 (three) times daily as needed for Anxiety., Disp: 90 tablet, Rfl: 1    DULoxetine (CYMBALTA) 60 MG capsule, Take 1 capsule (60 mg total) by mouth once daily., Disp: 30 capsule, Rfl: 1    zolpidem (AMBIEN) 10 mg Tab, Take 1 tablet (10 mg total) by mouth nightly as needed (insomnia)., Disp: 30 tablet, Rfl: 1

## 2025-08-07 ENCOUNTER — PATIENT MESSAGE (OUTPATIENT)
Facility: CLINIC | Age: 55
End: 2025-08-07
Payer: MEDICAID

## 2025-08-08 DIAGNOSIS — E78.5 HYPERLIPIDEMIA, UNSPECIFIED HYPERLIPIDEMIA TYPE: ICD-10-CM

## 2025-08-08 RX ORDER — ATORVASTATIN CALCIUM 20 MG/1
20 TABLET, FILM COATED ORAL
Qty: 90 TABLET | Refills: 1 | Status: SHIPPED | OUTPATIENT
Start: 2025-08-08

## 2025-08-27 ENCOUNTER — OFFICE VISIT (OUTPATIENT)
Dept: BEHAVIORAL HEALTH | Facility: CLINIC | Age: 55
End: 2025-08-27
Payer: MEDICAID

## 2025-08-27 VITALS
TEMPERATURE: 98 F | HEIGHT: 62 IN | WEIGHT: 149.69 LBS | SYSTOLIC BLOOD PRESSURE: 110 MMHG | OXYGEN SATURATION: 98 % | BODY MASS INDEX: 27.55 KG/M2 | DIASTOLIC BLOOD PRESSURE: 76 MMHG | HEART RATE: 94 BPM

## 2025-08-27 DIAGNOSIS — G47.00 INSOMNIA, UNSPECIFIED TYPE: ICD-10-CM

## 2025-08-27 DIAGNOSIS — F41.1 GENERALIZED ANXIETY DISORDER: ICD-10-CM

## 2025-08-27 DIAGNOSIS — F33.9 EPISODE OF RECURRENT MAJOR DEPRESSIVE DISORDER, UNSPECIFIED DEPRESSION EPISODE SEVERITY: Primary | ICD-10-CM

## 2025-08-27 PROCEDURE — 3044F HG A1C LEVEL LT 7.0%: CPT | Mod: CPTII,,, | Performed by: NURSE PRACTITIONER

## 2025-08-27 PROCEDURE — 3008F BODY MASS INDEX DOCD: CPT | Mod: CPTII,,, | Performed by: NURSE PRACTITIONER

## 2025-08-27 PROCEDURE — 1159F MED LIST DOCD IN RCRD: CPT | Mod: CPTII,,, | Performed by: NURSE PRACTITIONER

## 2025-08-27 PROCEDURE — 3074F SYST BP LT 130 MM HG: CPT | Mod: CPTII,,, | Performed by: NURSE PRACTITIONER

## 2025-08-27 PROCEDURE — 3078F DIAST BP <80 MM HG: CPT | Mod: CPTII,,, | Performed by: NURSE PRACTITIONER

## 2025-08-27 PROCEDURE — 1160F RVW MEDS BY RX/DR IN RCRD: CPT | Mod: CPTII,,, | Performed by: NURSE PRACTITIONER

## 2025-08-27 PROCEDURE — 99214 OFFICE O/P EST MOD 30 MIN: CPT | Mod: ,,, | Performed by: NURSE PRACTITIONER

## 2025-08-27 RX ORDER — ALPRAZOLAM 0.5 MG/1
0.5 TABLET ORAL 3 TIMES DAILY PRN
Qty: 90 TABLET | Refills: 1 | Status: SHIPPED | OUTPATIENT
Start: 2025-08-27

## 2025-08-27 RX ORDER — DULOXETIN HYDROCHLORIDE 60 MG/1
60 CAPSULE, DELAYED RELEASE ORAL DAILY
Qty: 30 CAPSULE | Refills: 1 | Status: SHIPPED | OUTPATIENT
Start: 2025-08-27

## 2025-08-27 RX ORDER — ZOLPIDEM TARTRATE 10 MG/1
10 TABLET ORAL NIGHTLY PRN
Qty: 30 TABLET | Refills: 1 | Status: SHIPPED | OUTPATIENT
Start: 2025-08-27